# Patient Record
Sex: FEMALE | Race: WHITE | NOT HISPANIC OR LATINO | Employment: FULL TIME | URBAN - METROPOLITAN AREA
[De-identification: names, ages, dates, MRNs, and addresses within clinical notes are randomized per-mention and may not be internally consistent; named-entity substitution may affect disease eponyms.]

---

## 2020-06-19 ENCOUNTER — TRANSCRIBE ORDERS (OUTPATIENT)
Dept: ADMINISTRATIVE | Facility: HOSPITAL | Age: 65
End: 2020-06-19

## 2020-06-19 DIAGNOSIS — K75.4 AUTOIMMUNE HEPATITIS (HCC): Primary | ICD-10-CM

## 2020-06-21 ENCOUNTER — APPOINTMENT (OUTPATIENT)
Dept: PREADMISSION TESTING | Facility: HOSPITAL | Age: 65
End: 2020-06-21

## 2020-06-24 ENCOUNTER — APPOINTMENT (OUTPATIENT)
Dept: CT IMAGING | Facility: HOSPITAL | Age: 65
End: 2020-06-24

## 2020-06-26 ENCOUNTER — APPOINTMENT (OUTPATIENT)
Dept: PREADMISSION TESTING | Facility: HOSPITAL | Age: 65
End: 2020-06-26

## 2020-06-26 PROCEDURE — U0002 COVID-19 LAB TEST NON-CDC: HCPCS

## 2020-06-26 PROCEDURE — C9803 HOPD COVID-19 SPEC COLLECT: HCPCS

## 2020-06-26 PROCEDURE — U0004 COV-19 TEST NON-CDC HGH THRU: HCPCS

## 2020-06-27 LAB
REF LAB TEST METHOD: NORMAL
SARS-COV-2 RNA RESP QL NAA+PROBE: NOT DETECTED

## 2020-06-29 ENCOUNTER — HOSPITAL ENCOUNTER (OUTPATIENT)
Dept: CT IMAGING | Facility: HOSPITAL | Age: 65
Discharge: HOME | End: 2020-06-29
Admitting: INTERNAL MEDICINE

## 2020-06-29 VITALS
BODY MASS INDEX: 28.07 KG/M2 | HEIGHT: 56 IN | RESPIRATION RATE: 20 BRPM | OXYGEN SATURATION: 100 % | DIASTOLIC BLOOD PRESSURE: 74 MMHG | TEMPERATURE: 97.4 F | SYSTOLIC BLOOD PRESSURE: 136 MMHG | HEART RATE: 76 BPM | WEIGHT: 124.8 LBS

## 2020-06-29 DIAGNOSIS — K75.4 AUTOIMMUNE HEPATITIS (HCC): ICD-10-CM

## 2020-06-29 LAB
APTT PPP: 34.3 SECONDS (ref 24–37)
INR PPP: 1.06 (ref 0.85–1.16)
PLATELET # BLD AUTO: 196 10*3/MM3 (ref 140–450)
PROTHROMBIN TIME: 13.5 SECONDS (ref 11.5–14)

## 2020-06-29 PROCEDURE — 88307 TISSUE EXAM BY PATHOLOGIST: CPT | Performed by: INTERNAL MEDICINE

## 2020-06-29 PROCEDURE — 99152 MOD SED SAME PHYS/QHP 5/>YRS: CPT

## 2020-06-29 PROCEDURE — 25010000002 MIDAZOLAM PER 1 MG: Performed by: RADIOLOGY

## 2020-06-29 PROCEDURE — 85049 AUTOMATED PLATELET COUNT: CPT | Performed by: RADIOLOGY

## 2020-06-29 PROCEDURE — 88313 SPECIAL STAINS GROUP 2: CPT | Performed by: INTERNAL MEDICINE

## 2020-06-29 PROCEDURE — 85610 PROTHROMBIN TIME: CPT | Performed by: RADIOLOGY

## 2020-06-29 PROCEDURE — 77012 CT SCAN FOR NEEDLE BIOPSY: CPT

## 2020-06-29 PROCEDURE — 25010000002 FENTANYL CITRATE (PF) 100 MCG/2ML SOLUTION: Performed by: RADIOLOGY

## 2020-06-29 PROCEDURE — 85730 THROMBOPLASTIN TIME PARTIAL: CPT | Performed by: RADIOLOGY

## 2020-06-29 RX ORDER — FENTANYL CITRATE 50 UG/ML
INJECTION, SOLUTION INTRAMUSCULAR; INTRAVENOUS
Status: COMPLETED | OUTPATIENT
Start: 2020-06-29 | End: 2020-06-29

## 2020-06-29 RX ORDER — FERROUS SULFATE 325(65) MG
325 TABLET ORAL 2 TIMES DAILY
COMMUNITY

## 2020-06-29 RX ORDER — CELECOXIB 200 MG/1
200 CAPSULE ORAL DAILY
COMMUNITY
End: 2022-03-04 | Stop reason: HOSPADM

## 2020-06-29 RX ORDER — FENTANYL CITRATE 50 UG/ML
INJECTION, SOLUTION INTRAMUSCULAR; INTRAVENOUS
Status: DISPENSED
Start: 2020-06-29 | End: 2020-06-29

## 2020-06-29 RX ORDER — OMEPRAZOLE 20 MG/1
20 CAPSULE, DELAYED RELEASE ORAL 2 TIMES DAILY
COMMUNITY
End: 2022-03-04 | Stop reason: HOSPADM

## 2020-06-29 RX ORDER — HYDROXYCHLOROQUINE SULFATE 200 MG/1
200 TABLET, FILM COATED ORAL DAILY
COMMUNITY
End: 2022-03-04 | Stop reason: HOSPADM

## 2020-06-29 RX ORDER — PREDNISONE 1 MG/1
5 TABLET ORAL DAILY
COMMUNITY

## 2020-06-29 RX ORDER — MIDAZOLAM HYDROCHLORIDE 1 MG/ML
INJECTION INTRAMUSCULAR; INTRAVENOUS
Status: DISPENSED
Start: 2020-06-29 | End: 2020-06-29

## 2020-06-29 RX ORDER — FOLIC ACID 1 MG/1
1 TABLET ORAL DAILY
COMMUNITY
End: 2022-03-04 | Stop reason: HOSPADM

## 2020-06-29 RX ORDER — SODIUM CHLORIDE 0.9 % (FLUSH) 0.9 %
10 SYRINGE (ML) INJECTION AS NEEDED
Status: DISCONTINUED | OUTPATIENT
Start: 2020-06-29 | End: 2020-06-30 | Stop reason: HOSPADM

## 2020-06-29 RX ORDER — LIDOCAINE HYDROCHLORIDE 10 MG/ML
20 INJECTION, SOLUTION EPIDURAL; INFILTRATION; INTRACAUDAL; PERINEURAL ONCE
Status: COMPLETED | OUTPATIENT
Start: 2020-06-29 | End: 2020-06-29

## 2020-06-29 RX ORDER — FUROSEMIDE 20 MG/1
20 TABLET ORAL DAILY
COMMUNITY
End: 2021-12-02 | Stop reason: HOSPADM

## 2020-06-29 RX ORDER — SODIUM CHLORIDE 0.9 % (FLUSH) 0.9 %
3 SYRINGE (ML) INJECTION EVERY 12 HOURS SCHEDULED
Status: DISCONTINUED | OUTPATIENT
Start: 2020-06-29 | End: 2020-06-30 | Stop reason: HOSPADM

## 2020-06-29 RX ORDER — MIDAZOLAM HYDROCHLORIDE 1 MG/ML
INJECTION INTRAMUSCULAR; INTRAVENOUS
Status: COMPLETED | OUTPATIENT
Start: 2020-06-29 | End: 2020-06-29

## 2020-06-29 RX ADMIN — MIDAZOLAM 1 MG: 1 INJECTION INTRAMUSCULAR; INTRAVENOUS at 09:54

## 2020-06-29 RX ADMIN — MIDAZOLAM 1 MG: 1 INJECTION INTRAMUSCULAR; INTRAVENOUS at 09:51

## 2020-06-29 RX ADMIN — FENTANYL CITRATE 50 MCG: 0.05 INJECTION, SOLUTION INTRAMUSCULAR; INTRAVENOUS at 09:51

## 2020-06-29 RX ADMIN — LIDOCAINE HYDROCHLORIDE 20 ML: 10 INJECTION, SOLUTION EPIDURAL; INFILTRATION; INTRACAUDAL; PERINEURAL at 09:42

## 2020-06-29 NOTE — POST-PROCEDURE NOTE
Interventional Radiology Operative Note    Date: 06/29/20     Time: 10:13     Pre-op Diagnosis: RA. Polycystic liver. Elevated liver enzymes.  Post-op Diagnosis: Same    Procedure: CT guided liver biopsy    Surgeon: FELICITA Espinoza M.D.  Assistants: None    Sedation: Moderate sedation    Estimated Blood Loss (EBL): Trace     Urine Output (UOP): N/A (short procedure)    IVF: N/A (short procedure)    Findings: Polycystic liver    Specimens: 18 gauge core X3     Complications: No immediate    Disposition: Recovery. Stable.

## 2020-06-30 ENCOUNTER — TELEPHONE (OUTPATIENT)
Dept: INFUSION THERAPY | Facility: HOSPITAL | Age: 65
End: 2020-06-30

## 2020-07-27 LAB
CYTO UR: NORMAL
LAB AP CASE REPORT: NORMAL
LAB AP CLINICAL INFORMATION: NORMAL
LAB AP DIAGNOSIS COMMENT: NORMAL
LAB AP SPECIAL STAINS: NORMAL
PATH REPORT.FINAL DX SPEC: NORMAL
PATH REPORT.GROSS SPEC: NORMAL

## 2021-11-21 ENCOUNTER — APPOINTMENT (OUTPATIENT)
Dept: OTHER | Facility: HOSPITAL | Age: 66
End: 2021-11-21

## 2021-11-21 ENCOUNTER — HOSPITAL ENCOUNTER (INPATIENT)
Facility: HOSPITAL | Age: 66
LOS: 11 days | Discharge: NURSING, REHAB OR LONG-TERM CARE | End: 2021-12-02
Attending: INTERNAL MEDICINE | Admitting: INTERNAL MEDICINE

## 2021-11-21 PROBLEM — E87.20 LACTIC ACIDEMIA: Status: ACTIVE | Noted: 2021-11-21

## 2021-11-21 PROBLEM — A41.9 SEPSIS (HCC): Status: ACTIVE | Noted: 2021-11-21

## 2021-11-21 PROBLEM — R65.21 SEPTIC SHOCK: Status: ACTIVE | Noted: 2021-11-21

## 2021-11-21 PROBLEM — A41.9 SEPTIC SHOCK (HCC): Status: ACTIVE | Noted: 2021-11-21

## 2021-11-21 PROBLEM — G89.29 CHRONIC PAIN: Status: ACTIVE | Noted: 2021-11-21

## 2021-11-21 PROBLEM — N13.30 HYDRONEPHROSIS OF LEFT KIDNEY: Status: ACTIVE | Noted: 2021-11-21

## 2021-11-21 PROBLEM — N81.9 PELVIC PROLAPSE: Status: ACTIVE | Noted: 2021-11-21

## 2021-11-21 PROBLEM — R65.21 SEPTIC SHOCK (HCC): Status: RESOLVED | Noted: 2021-11-21 | Resolved: 2021-11-21

## 2021-11-21 PROBLEM — N81.10 FEMALE BLADDER PROLAPSE: Status: ACTIVE | Noted: 2021-11-21

## 2021-11-21 PROBLEM — A41.9 SEPTIC SHOCK: Status: RESOLVED | Noted: 2021-11-21 | Resolved: 2021-11-21

## 2021-11-21 PROBLEM — G89.29 CHRONIC PAIN: Chronic | Status: ACTIVE | Noted: 2021-11-21

## 2021-11-21 LAB
ALBUMIN SERPL-MCNC: 2.4 G/DL (ref 3.5–5.2)
ALBUMIN/GLOB SERPL: 0.9 G/DL
ALP SERPL-CCNC: 505 U/L (ref 39–117)
ALT SERPL W P-5'-P-CCNC: 7 U/L (ref 1–33)
ANION GAP SERPL CALCULATED.3IONS-SCNC: 11 MMOL/L (ref 5–15)
AST SERPL-CCNC: 18 U/L (ref 1–32)
BACTERIA UR QL AUTO: ABNORMAL /HPF
BASOPHILS # BLD MANUAL: 0 10*3/MM3 (ref 0–0.2)
BASOPHILS NFR BLD MANUAL: 0 % (ref 0–1.5)
BILIRUB SERPL-MCNC: 0.5 MG/DL (ref 0–1.2)
BILIRUB UR QL STRIP: NEGATIVE
BUN SERPL-MCNC: 20 MG/DL (ref 8–23)
BUN/CREAT SERPL: 35.1 (ref 7–25)
CALCIUM SPEC-SCNC: 7.5 MG/DL (ref 8.6–10.5)
CHLORIDE SERPL-SCNC: 109 MMOL/L (ref 98–107)
CLARITY UR: ABNORMAL
CO2 SERPL-SCNC: 18 MMOL/L (ref 22–29)
COLOR UR: YELLOW
CREAT SERPL-MCNC: 0.57 MG/DL (ref 0.57–1)
D-LACTATE SERPL-SCNC: 1.1 MMOL/L (ref 0.5–2)
D-LACTATE SERPL-SCNC: 1.3 MMOL/L (ref 0.5–2)
DEPRECATED RDW RBC AUTO: 51.2 FL (ref 37–54)
EOSINOPHIL # BLD MANUAL: 0 10*3/MM3 (ref 0–0.4)
EOSINOPHIL NFR BLD MANUAL: 0 % (ref 0.3–6.2)
ERYTHROCYTE [DISTWIDTH] IN BLOOD BY AUTOMATED COUNT: 14.6 % (ref 12.3–15.4)
GFR SERPL CREATININE-BSD FRML MDRD: 107 ML/MIN/1.73
GLOBULIN UR ELPH-MCNC: 2.7 GM/DL
GLUCOSE SERPL-MCNC: 96 MG/DL (ref 65–99)
GLUCOSE UR STRIP-MCNC: NEGATIVE MG/DL
HCT VFR BLD AUTO: 30.6 % (ref 34–46.6)
HGB BLD-MCNC: 9.4 G/DL (ref 12–15.9)
HGB UR QL STRIP.AUTO: ABNORMAL
HYALINE CASTS UR QL AUTO: ABNORMAL /LPF
INR PPP: 1.14 (ref 0.85–1.16)
KETONES UR QL STRIP: NEGATIVE
LEUKOCYTE ESTERASE UR QL STRIP.AUTO: ABNORMAL
LYMPHOCYTES # BLD MANUAL: 0.08 10*3/MM3 (ref 0.7–3.1)
LYMPHOCYTES NFR BLD MANUAL: 1 % (ref 5–12)
MCH RBC QN AUTO: 29.3 PG (ref 26.6–33)
MCHC RBC AUTO-ENTMCNC: 30.7 G/DL (ref 31.5–35.7)
MCV RBC AUTO: 95.3 FL (ref 79–97)
MONOCYTES # BLD: 0.08 10*3/MM3 (ref 0.1–0.9)
MRSA DNA SPEC QL NAA+PROBE: NEGATIVE
NEUTROPHILS # BLD AUTO: 7.54 10*3/MM3 (ref 1.7–7)
NEUTROPHILS NFR BLD MANUAL: 71 % (ref 42.7–76)
NEUTS BAND NFR BLD MANUAL: 27 % (ref 0–5)
NITRITE UR QL STRIP: POSITIVE
NRBC SPEC MANUAL: 0 /100 WBC (ref 0–0.2)
OVALOCYTES BLD QL SMEAR: ABNORMAL
PH UR STRIP.AUTO: <=5 [PH] (ref 5–8)
PLAT MORPH BLD: NORMAL
PLATELET # BLD AUTO: 191 10*3/MM3 (ref 140–450)
PMV BLD AUTO: 10.1 FL (ref 6–12)
POTASSIUM SERPL-SCNC: 3.3 MMOL/L (ref 3.5–5.2)
PROCALCITONIN SERPL-MCNC: 77.45 NG/ML (ref 0–0.25)
PROT SERPL-MCNC: 5.1 G/DL (ref 6–8.5)
PROT UR QL STRIP: ABNORMAL
PROTHROMBIN TIME: 14.2 SECONDS (ref 11.4–14.4)
RBC # BLD AUTO: 3.21 10*6/MM3 (ref 3.77–5.28)
RBC # UR STRIP: ABNORMAL /HPF
REF LAB TEST METHOD: ABNORMAL
RENAL EPI CELLS #/AREA URNS HPF: ABNORMAL /HPF
SARS-COV-2 RDRP RESP QL NAA+PROBE: NORMAL
SODIUM SERPL-SCNC: 138 MMOL/L (ref 136–145)
SP GR UR STRIP: 1.04 (ref 1–1.03)
SQUAMOUS #/AREA URNS HPF: ABNORMAL /HPF
TROPONIN T SERPL-MCNC: <0.01 NG/ML (ref 0–0.03)
UROBILINOGEN UR QL STRIP: ABNORMAL
VARIANT LYMPHS NFR BLD MANUAL: 1 % (ref 19.6–45.3)
WBC # UR STRIP: ABNORMAL /HPF
WBC MORPH BLD: NORMAL
WBC NRBC COR # BLD: 7.69 10*3/MM3 (ref 3.4–10.8)

## 2021-11-21 PROCEDURE — 81001 URINALYSIS AUTO W/SCOPE: CPT | Performed by: NURSE PRACTITIONER

## 2021-11-21 PROCEDURE — 87635 SARS-COV-2 COVID-19 AMP PRB: CPT | Performed by: INTERNAL MEDICINE

## 2021-11-21 PROCEDURE — 87076 CULTURE ANAEROBE IDENT EACH: CPT | Performed by: INTERNAL MEDICINE

## 2021-11-21 PROCEDURE — 87070 CULTURE OTHR SPECIMN AEROBIC: CPT | Performed by: NURSE PRACTITIONER

## 2021-11-21 PROCEDURE — 25010000002 HEPARIN (PORCINE) PER 1000 UNITS: Performed by: INTERNAL MEDICINE

## 2021-11-21 PROCEDURE — 84484 ASSAY OF TROPONIN QUANT: CPT | Performed by: NURSE PRACTITIONER

## 2021-11-21 PROCEDURE — 63710000001 PREDNISONE PER 5 MG: Performed by: INTERNAL MEDICINE

## 2021-11-21 PROCEDURE — 87641 MR-STAPH DNA AMP PROBE: CPT | Performed by: NURSE PRACTITIONER

## 2021-11-21 PROCEDURE — 85007 BL SMEAR W/DIFF WBC COUNT: CPT | Performed by: NURSE PRACTITIONER

## 2021-11-21 PROCEDURE — 84145 PROCALCITONIN (PCT): CPT | Performed by: NURSE PRACTITIONER

## 2021-11-21 PROCEDURE — 80053 COMPREHEN METABOLIC PANEL: CPT | Performed by: NURSE PRACTITIONER

## 2021-11-21 PROCEDURE — 25010000002 PIPERACILLIN SOD-TAZOBACTAM PER 1 G: Performed by: INTERNAL MEDICINE

## 2021-11-21 PROCEDURE — 25010000002 VANCOMYCIN PER 500 MG

## 2021-11-21 PROCEDURE — 85610 PROTHROMBIN TIME: CPT | Performed by: NURSE PRACTITIONER

## 2021-11-21 PROCEDURE — 25010000002 ONDANSETRON PER 1 MG: Performed by: NURSE PRACTITIONER

## 2021-11-21 PROCEDURE — 87186 SC STD MICRODIL/AGAR DIL: CPT | Performed by: INTERNAL MEDICINE

## 2021-11-21 PROCEDURE — 83605 ASSAY OF LACTIC ACID: CPT | Performed by: INTERNAL MEDICINE

## 2021-11-21 PROCEDURE — 85025 COMPLETE CBC W/AUTO DIFF WBC: CPT | Performed by: NURSE PRACTITIONER

## 2021-11-21 PROCEDURE — 87086 URINE CULTURE/COLONY COUNT: CPT | Performed by: INTERNAL MEDICINE

## 2021-11-21 PROCEDURE — 87205 SMEAR GRAM STAIN: CPT | Performed by: INTERNAL MEDICINE

## 2021-11-21 PROCEDURE — 87205 SMEAR GRAM STAIN: CPT | Performed by: NURSE PRACTITIONER

## 2021-11-21 PROCEDURE — 87040 BLOOD CULTURE FOR BACTERIA: CPT | Performed by: NURSE PRACTITIONER

## 2021-11-21 PROCEDURE — 83605 ASSAY OF LACTIC ACID: CPT | Performed by: NURSE PRACTITIONER

## 2021-11-21 PROCEDURE — 99291 CRITICAL CARE FIRST HOUR: CPT | Performed by: INTERNAL MEDICINE

## 2021-11-21 PROCEDURE — 25010000002 PIPERACILLIN SOD-TAZOBACTAM PER 1 G: Performed by: NURSE PRACTITIONER

## 2021-11-21 PROCEDURE — 87070 CULTURE OTHR SPECIMN AEROBIC: CPT | Performed by: INTERNAL MEDICINE

## 2021-11-21 RX ORDER — PANTOPRAZOLE SODIUM 40 MG/1
40 TABLET, DELAYED RELEASE ORAL
Status: DISCONTINUED | OUTPATIENT
Start: 2021-11-22 | End: 2021-12-02 | Stop reason: HOSPADM

## 2021-11-21 RX ORDER — SODIUM CHLORIDE 9 MG/ML
100 INJECTION, SOLUTION INTRAVENOUS CONTINUOUS
Status: DISCONTINUED | OUTPATIENT
Start: 2021-11-21 | End: 2021-11-26

## 2021-11-21 RX ORDER — FOLIC ACID 1 MG/1
1 TABLET ORAL DAILY
Status: DISCONTINUED | OUTPATIENT
Start: 2021-11-21 | End: 2021-12-02 | Stop reason: HOSPADM

## 2021-11-21 RX ORDER — SODIUM CHLORIDE 0.9 % (FLUSH) 0.9 %
10 SYRINGE (ML) INJECTION AS NEEDED
Status: DISCONTINUED | OUTPATIENT
Start: 2021-11-21 | End: 2021-11-22

## 2021-11-21 RX ORDER — HEPARIN SODIUM 5000 [USP'U]/ML
5000 INJECTION, SOLUTION INTRAVENOUS; SUBCUTANEOUS EVERY 8 HOURS SCHEDULED
Status: DISCONTINUED | OUTPATIENT
Start: 2021-11-21 | End: 2021-12-02 | Stop reason: HOSPADM

## 2021-11-21 RX ORDER — NOREPINEPHRINE BIT/0.9 % NACL 8 MG/250ML
.02-.3 INFUSION BOTTLE (ML) INTRAVENOUS
Status: DISCONTINUED | OUTPATIENT
Start: 2021-11-21 | End: 2021-11-22

## 2021-11-21 RX ORDER — PREDNISONE 1 MG/1
5 TABLET ORAL DAILY
Status: DISCONTINUED | OUTPATIENT
Start: 2021-11-21 | End: 2021-12-02 | Stop reason: HOSPADM

## 2021-11-21 RX ORDER — ONDANSETRON 2 MG/ML
4 INJECTION INTRAMUSCULAR; INTRAVENOUS EVERY 6 HOURS PRN
Status: DISCONTINUED | OUTPATIENT
Start: 2021-11-21 | End: 2021-12-02 | Stop reason: HOSPADM

## 2021-11-21 RX ORDER — SODIUM CHLORIDE 0.9 % (FLUSH) 0.9 %
10 SYRINGE (ML) INJECTION EVERY 12 HOURS SCHEDULED
Status: DISCONTINUED | OUTPATIENT
Start: 2021-11-21 | End: 2021-11-22

## 2021-11-21 RX ADMIN — FOLIC ACID 1 MG: 1 TABLET ORAL at 14:23

## 2021-11-21 RX ADMIN — HEPARIN SODIUM 5000 UNITS: 5000 INJECTION INTRAVENOUS; SUBCUTANEOUS at 22:28

## 2021-11-21 RX ADMIN — SODIUM CHLORIDE 100 ML/HR: 9 INJECTION, SOLUTION INTRAVENOUS at 22:27

## 2021-11-21 RX ADMIN — TAZOBACTAM SODIUM AND PIPERACILLIN SODIUM 3.38 G: 375; 3 INJECTION, SOLUTION INTRAVENOUS at 22:28

## 2021-11-21 RX ADMIN — HEPARIN SODIUM 5000 UNITS: 5000 INJECTION INTRAVENOUS; SUBCUTANEOUS at 14:23

## 2021-11-21 RX ADMIN — VANCOMYCIN HYDROCHLORIDE 750 MG: 750 INJECTION, SOLUTION INTRAVENOUS at 17:02

## 2021-11-21 RX ADMIN — ONDANSETRON 4 MG: 2 INJECTION INTRAMUSCULAR; INTRAVENOUS at 17:02

## 2021-11-21 RX ADMIN — Medication 0.02 MCG/KG/MIN: at 14:22

## 2021-11-21 RX ADMIN — TAZOBACTAM SODIUM AND PIPERACILLIN SODIUM 3.38 G: 375; 3 INJECTION, SOLUTION INTRAVENOUS at 13:23

## 2021-11-21 RX ADMIN — PREDNISONE 5 MG: 5 TABLET ORAL at 14:23

## 2021-11-22 ENCOUNTER — APPOINTMENT (OUTPATIENT)
Dept: GENERAL RADIOLOGY | Facility: HOSPITAL | Age: 66
End: 2021-11-22

## 2021-11-22 LAB
ANION GAP SERPL CALCULATED.3IONS-SCNC: 9 MMOL/L (ref 5–15)
BASOPHILS # BLD AUTO: 0.01 10*3/MM3 (ref 0–0.2)
BASOPHILS NFR BLD AUTO: 0.3 % (ref 0–1.5)
BUN SERPL-MCNC: 13 MG/DL (ref 8–23)
BUN/CREAT SERPL: 26 (ref 7–25)
CALCIUM SPEC-SCNC: 7.4 MG/DL (ref 8.6–10.5)
CHLORIDE SERPL-SCNC: 109 MMOL/L (ref 98–107)
CO2 SERPL-SCNC: 19 MMOL/L (ref 22–29)
CREAT SERPL-MCNC: 0.5 MG/DL (ref 0.57–1)
D-LACTATE SERPL-SCNC: 0.7 MMOL/L (ref 0.5–2)
D-LACTATE SERPL-SCNC: 0.9 MMOL/L (ref 0.5–2)
D-LACTATE SERPL-SCNC: 1.4 MMOL/L (ref 0.5–2)
DEPRECATED RDW RBC AUTO: 50.1 FL (ref 37–54)
EOSINOPHIL # BLD AUTO: 0.01 10*3/MM3 (ref 0–0.4)
EOSINOPHIL NFR BLD AUTO: 0.3 % (ref 0.3–6.2)
ERYTHROCYTE [DISTWIDTH] IN BLOOD BY AUTOMATED COUNT: 14.6 % (ref 12.3–15.4)
GFR SERPL CREATININE-BSD FRML MDRD: 124 ML/MIN/1.73
GLUCOSE SERPL-MCNC: 77 MG/DL (ref 65–99)
HCT VFR BLD AUTO: 26.8 % (ref 34–46.6)
HGB BLD-MCNC: 8.4 G/DL (ref 12–15.9)
IMM GRANULOCYTES # BLD AUTO: 0.03 10*3/MM3 (ref 0–0.05)
IMM GRANULOCYTES NFR BLD AUTO: 1 % (ref 0–0.5)
LYMPHOCYTES # BLD AUTO: 0.14 10*3/MM3 (ref 0.7–3.1)
LYMPHOCYTES NFR BLD AUTO: 4.9 % (ref 19.6–45.3)
MAGNESIUM SERPL-MCNC: 1.4 MG/DL (ref 1.6–2.4)
MCH RBC QN AUTO: 29.2 PG (ref 26.6–33)
MCHC RBC AUTO-ENTMCNC: 31.3 G/DL (ref 31.5–35.7)
MCV RBC AUTO: 93.1 FL (ref 79–97)
MONOCYTES # BLD AUTO: 0.23 10*3/MM3 (ref 0.1–0.9)
MONOCYTES NFR BLD AUTO: 8 % (ref 5–12)
NEUTROPHILS NFR BLD AUTO: 2.45 10*3/MM3 (ref 1.7–7)
NEUTROPHILS NFR BLD AUTO: 85.5 % (ref 42.7–76)
NRBC BLD AUTO-RTO: 0 /100 WBC (ref 0–0.2)
PHOSPHATE SERPL-MCNC: 3 MG/DL (ref 2.5–4.5)
PLAT MORPH BLD: NORMAL
PLATELET # BLD AUTO: 145 10*3/MM3 (ref 140–450)
PMV BLD AUTO: 10 FL (ref 6–12)
POTASSIUM SERPL-SCNC: 2.9 MMOL/L (ref 3.5–5.2)
POTASSIUM SERPL-SCNC: 4.1 MMOL/L (ref 3.5–5.2)
RBC # BLD AUTO: 2.88 10*6/MM3 (ref 3.77–5.28)
RBC MORPH BLD: NORMAL
SODIUM SERPL-SCNC: 137 MMOL/L (ref 136–145)
WBC MORPH BLD: NORMAL
WBC NRBC COR # BLD: 2.87 10*3/MM3 (ref 3.4–10.8)

## 2021-11-22 PROCEDURE — C1751 CATH, INF, PER/CENT/MIDLINE: HCPCS

## 2021-11-22 PROCEDURE — 84132 ASSAY OF SERUM POTASSIUM: CPT | Performed by: INTERNAL MEDICINE

## 2021-11-22 PROCEDURE — 85007 BL SMEAR W/DIFF WBC COUNT: CPT | Performed by: INTERNAL MEDICINE

## 2021-11-22 PROCEDURE — 84100 ASSAY OF PHOSPHORUS: CPT | Performed by: INTERNAL MEDICINE

## 2021-11-22 PROCEDURE — 71045 X-RAY EXAM CHEST 1 VIEW: CPT

## 2021-11-22 PROCEDURE — 02HV33Z INSERTION OF INFUSION DEVICE INTO SUPERIOR VENA CAVA, PERCUTANEOUS APPROACH: ICD-10-PCS | Performed by: INTERNAL MEDICINE

## 2021-11-22 PROCEDURE — C1894 INTRO/SHEATH, NON-LASER: HCPCS

## 2021-11-22 PROCEDURE — 25010000002 HEPARIN (PORCINE) PER 1000 UNITS: Performed by: INTERNAL MEDICINE

## 2021-11-22 PROCEDURE — 83605 ASSAY OF LACTIC ACID: CPT | Performed by: INTERNAL MEDICINE

## 2021-11-22 PROCEDURE — 99232 SBSQ HOSP IP/OBS MODERATE 35: CPT | Performed by: INTERNAL MEDICINE

## 2021-11-22 PROCEDURE — 25010000002 MAGNESIUM SULFATE 2 GM/50ML SOLUTION: Performed by: NURSE PRACTITIONER

## 2021-11-22 PROCEDURE — 80048 BASIC METABOLIC PNL TOTAL CA: CPT | Performed by: INTERNAL MEDICINE

## 2021-11-22 PROCEDURE — 85025 COMPLETE CBC W/AUTO DIFF WBC: CPT | Performed by: INTERNAL MEDICINE

## 2021-11-22 PROCEDURE — 63710000001 PREDNISONE PER 5 MG: Performed by: INTERNAL MEDICINE

## 2021-11-22 PROCEDURE — 25010000002 PIPERACILLIN SOD-TAZOBACTAM PER 1 G: Performed by: INTERNAL MEDICINE

## 2021-11-22 PROCEDURE — 25010000002 VANCOMYCIN PER 500 MG

## 2021-11-22 PROCEDURE — 83735 ASSAY OF MAGNESIUM: CPT | Performed by: INTERNAL MEDICINE

## 2021-11-22 PROCEDURE — 0 MAGNESIUM SULFATE 4 GM/100ML SOLUTION: Performed by: NURSE PRACTITIONER

## 2021-11-22 RX ORDER — POTASSIUM CHLORIDE 1.5 G/1.77G
40 POWDER, FOR SOLUTION ORAL AS NEEDED
Status: DISCONTINUED | OUTPATIENT
Start: 2021-11-22 | End: 2021-12-02 | Stop reason: HOSPADM

## 2021-11-22 RX ORDER — POTASSIUM CHLORIDE 7.45 MG/ML
10 INJECTION INTRAVENOUS
Status: DISCONTINUED | OUTPATIENT
Start: 2021-11-22 | End: 2021-12-02 | Stop reason: HOSPADM

## 2021-11-22 RX ORDER — SODIUM CHLORIDE 0.9 % (FLUSH) 0.9 %
10 SYRINGE (ML) INJECTION AS NEEDED
Status: DISCONTINUED | OUTPATIENT
Start: 2021-11-22 | End: 2021-12-02 | Stop reason: HOSPADM

## 2021-11-22 RX ORDER — HYDROCODONE BITARTRATE AND ACETAMINOPHEN 5; 325 MG/1; MG/1
1 TABLET ORAL EVERY 8 HOURS PRN
COMMUNITY
End: 2021-12-02 | Stop reason: HOSPADM

## 2021-11-22 RX ORDER — MAGNESIUM SULFATE HEPTAHYDRATE 40 MG/ML
4 INJECTION, SOLUTION INTRAVENOUS AS NEEDED
Status: DISCONTINUED | OUTPATIENT
Start: 2021-11-22 | End: 2021-12-02 | Stop reason: HOSPADM

## 2021-11-22 RX ORDER — MAGNESIUM SULFATE HEPTAHYDRATE 40 MG/ML
2 INJECTION, SOLUTION INTRAVENOUS AS NEEDED
Status: DISCONTINUED | OUTPATIENT
Start: 2021-11-22 | End: 2021-12-02 | Stop reason: HOSPADM

## 2021-11-22 RX ORDER — ACETAMINOPHEN 325 MG/1
650 TABLET ORAL EVERY 6 HOURS PRN
Status: DISCONTINUED | OUTPATIENT
Start: 2021-11-22 | End: 2021-12-02 | Stop reason: HOSPADM

## 2021-11-22 RX ORDER — POTASSIUM CHLORIDE 750 MG/1
40 CAPSULE, EXTENDED RELEASE ORAL AS NEEDED
Status: DISCONTINUED | OUTPATIENT
Start: 2021-11-22 | End: 2021-12-02 | Stop reason: HOSPADM

## 2021-11-22 RX ORDER — SODIUM CHLORIDE 0.9 % (FLUSH) 0.9 %
10 SYRINGE (ML) INJECTION EVERY 12 HOURS SCHEDULED
Status: DISCONTINUED | OUTPATIENT
Start: 2021-11-22 | End: 2021-12-02 | Stop reason: HOSPADM

## 2021-11-22 RX ORDER — HYDROCODONE BITARTRATE AND ACETAMINOPHEN 7.5; 325 MG/1; MG/1
1 TABLET ORAL EVERY 6 HOURS PRN
Status: DISPENSED | OUTPATIENT
Start: 2021-11-22 | End: 2021-11-29

## 2021-11-22 RX ORDER — SODIUM CHLORIDE 0.9 % (FLUSH) 0.9 %
20 SYRINGE (ML) INJECTION AS NEEDED
Status: DISCONTINUED | OUTPATIENT
Start: 2021-11-22 | End: 2021-12-02 | Stop reason: HOSPADM

## 2021-11-22 RX ORDER — HYDROCODONE BITARTRATE AND ACETAMINOPHEN 5; 325 MG/1; MG/1
1 TABLET ORAL ONCE AS NEEDED
Status: COMPLETED | OUTPATIENT
Start: 2021-11-22 | End: 2021-11-22

## 2021-11-22 RX ADMIN — MAGNESIUM SULFATE HEPTAHYDRATE 4 G: 40 INJECTION, SOLUTION INTRAVENOUS at 10:18

## 2021-11-22 RX ADMIN — POTASSIUM CHLORIDE 40 MEQ: 750 CAPSULE, EXTENDED RELEASE ORAL at 10:18

## 2021-11-22 RX ADMIN — HEPARIN SODIUM 5000 UNITS: 5000 INJECTION INTRAVENOUS; SUBCUTANEOUS at 13:34

## 2021-11-22 RX ADMIN — TAZOBACTAM SODIUM AND PIPERACILLIN SODIUM 3.38 G: 375; 3 INJECTION, SOLUTION INTRAVENOUS at 05:34

## 2021-11-22 RX ADMIN — FOLIC ACID 1 MG: 1 TABLET ORAL at 08:18

## 2021-11-22 RX ADMIN — VANCOMYCIN HYDROCHLORIDE 750 MG: 750 INJECTION, SOLUTION INTRAVENOUS at 17:10

## 2021-11-22 RX ADMIN — POTASSIUM CHLORIDE 40 MEQ: 750 CAPSULE, EXTENDED RELEASE ORAL at 13:34

## 2021-11-22 RX ADMIN — MAGNESIUM SULFATE HEPTAHYDRATE 2 G: 2 INJECTION, SOLUTION INTRAVENOUS at 13:35

## 2021-11-22 RX ADMIN — HYDROCODONE BITARTRATE AND ACETAMINOPHEN 1 TABLET: 5; 325 TABLET ORAL at 17:10

## 2021-11-22 RX ADMIN — HYDROCODONE BITARTRATE AND ACETAMINOPHEN 1 TABLET: 7.5; 325 TABLET ORAL at 21:33

## 2021-11-22 RX ADMIN — ACETAMINOPHEN 650 MG: 325 TABLET, FILM COATED ORAL at 08:21

## 2021-11-22 RX ADMIN — SODIUM CHLORIDE 100 ML/HR: 9 INJECTION, SOLUTION INTRAVENOUS at 08:18

## 2021-11-22 RX ADMIN — MICONAZOLE NITRATE: 20 POWDER TOPICAL at 11:53

## 2021-11-22 RX ADMIN — PANTOPRAZOLE SODIUM 40 MG: 40 TABLET, DELAYED RELEASE ORAL at 05:33

## 2021-11-22 RX ADMIN — TAZOBACTAM SODIUM AND PIPERACILLIN SODIUM 3.38 G: 375; 3 INJECTION, SOLUTION INTRAVENOUS at 23:34

## 2021-11-22 RX ADMIN — PREDNISONE 5 MG: 5 TABLET ORAL at 08:18

## 2021-11-22 RX ADMIN — ACETAMINOPHEN 650 MG: 325 TABLET, FILM COATED ORAL at 03:36

## 2021-11-22 RX ADMIN — VANCOMYCIN HYDROCHLORIDE 750 MG: 750 INJECTION, SOLUTION INTRAVENOUS at 05:33

## 2021-11-22 RX ADMIN — HEPARIN SODIUM 5000 UNITS: 5000 INJECTION INTRAVENOUS; SUBCUTANEOUS at 05:34

## 2021-11-22 RX ADMIN — SODIUM CHLORIDE 100 ML/HR: 9 INJECTION, SOLUTION INTRAVENOUS at 21:33

## 2021-11-22 RX ADMIN — HEPARIN SODIUM 5000 UNITS: 5000 INJECTION INTRAVENOUS; SUBCUTANEOUS at 21:08

## 2021-11-22 RX ADMIN — SODIUM CHLORIDE 100 ML/HR: 9 INJECTION, SOLUTION INTRAVENOUS at 17:10

## 2021-11-22 RX ADMIN — TAZOBACTAM SODIUM AND PIPERACILLIN SODIUM 3.38 G: 375; 3 INJECTION, SOLUTION INTRAVENOUS at 13:35

## 2021-11-23 LAB
ANION GAP SERPL CALCULATED.3IONS-SCNC: 6 MMOL/L (ref 5–15)
BACTERIA SPEC AEROBE CULT: ABNORMAL
BUN SERPL-MCNC: 12 MG/DL (ref 8–23)
BUN/CREAT SERPL: 23.1 (ref 7–25)
CALCIUM SPEC-SCNC: 7.7 MG/DL (ref 8.6–10.5)
CHLORIDE SERPL-SCNC: 112 MMOL/L (ref 98–107)
CO2 SERPL-SCNC: 21 MMOL/L (ref 22–29)
CREAT SERPL-MCNC: 0.52 MG/DL (ref 0.57–1)
DEPRECATED RDW RBC AUTO: 51.9 FL (ref 37–54)
ERYTHROCYTE [DISTWIDTH] IN BLOOD BY AUTOMATED COUNT: 14.7 % (ref 12.3–15.4)
GFR SERPL CREATININE-BSD FRML MDRD: 119 ML/MIN/1.73
GLUCOSE SERPL-MCNC: 70 MG/DL (ref 65–99)
GRAM STN SPEC: ABNORMAL
HCT VFR BLD AUTO: 31.4 % (ref 34–46.6)
HGB BLD-MCNC: 9.7 G/DL (ref 12–15.9)
MAGNESIUM SERPL-MCNC: 3 MG/DL (ref 1.6–2.4)
MCH RBC QN AUTO: 29.3 PG (ref 26.6–33)
MCHC RBC AUTO-ENTMCNC: 30.9 G/DL (ref 31.5–35.7)
MCV RBC AUTO: 94.9 FL (ref 79–97)
PLATELET # BLD AUTO: 152 10*3/MM3 (ref 140–450)
PMV BLD AUTO: 10.5 FL (ref 6–12)
POTASSIUM SERPL-SCNC: 4.8 MMOL/L (ref 3.5–5.2)
RBC # BLD AUTO: 3.31 10*6/MM3 (ref 3.77–5.28)
SODIUM SERPL-SCNC: 139 MMOL/L (ref 136–145)
VANCOMYCIN TROUGH SERPL-MCNC: 14.3 MCG/ML (ref 5–20)
WBC NRBC COR # BLD: 3.05 10*3/MM3 (ref 3.4–10.8)

## 2021-11-23 PROCEDURE — 83735 ASSAY OF MAGNESIUM: CPT | Performed by: INTERNAL MEDICINE

## 2021-11-23 PROCEDURE — 85027 COMPLETE CBC AUTOMATED: CPT | Performed by: INTERNAL MEDICINE

## 2021-11-23 PROCEDURE — 25010000002 CEFTRIAXONE PER 250 MG: Performed by: HOSPITALIST

## 2021-11-23 PROCEDURE — 80048 BASIC METABOLIC PNL TOTAL CA: CPT | Performed by: INTERNAL MEDICINE

## 2021-11-23 PROCEDURE — 63710000001 PREDNISONE PER 5 MG: Performed by: INTERNAL MEDICINE

## 2021-11-23 PROCEDURE — 25010000002 HEPARIN (PORCINE) PER 1000 UNITS: Performed by: INTERNAL MEDICINE

## 2021-11-23 PROCEDURE — 25010000002 VANCOMYCIN PER 500 MG: Performed by: INTERNAL MEDICINE

## 2021-11-23 PROCEDURE — 25010000002 PIPERACILLIN SOD-TAZOBACTAM PER 1 G: Performed by: INTERNAL MEDICINE

## 2021-11-23 PROCEDURE — 80202 ASSAY OF VANCOMYCIN: CPT | Performed by: INTERNAL MEDICINE

## 2021-11-23 PROCEDURE — 99233 SBSQ HOSP IP/OBS HIGH 50: CPT | Performed by: HOSPITALIST

## 2021-11-23 RX ADMIN — FOLIC ACID 1 MG: 1 TABLET ORAL at 08:19

## 2021-11-23 RX ADMIN — MICONAZOLE NITRATE: 20 POWDER TOPICAL at 21:34

## 2021-11-23 RX ADMIN — HEPARIN SODIUM 5000 UNITS: 5000 INJECTION INTRAVENOUS; SUBCUTANEOUS at 21:24

## 2021-11-23 RX ADMIN — SODIUM CHLORIDE 100 ML/HR: 9 INJECTION, SOLUTION INTRAVENOUS at 08:29

## 2021-11-23 RX ADMIN — SODIUM CHLORIDE 100 ML/HR: 9 INJECTION, SOLUTION INTRAVENOUS at 21:28

## 2021-11-23 RX ADMIN — TAZOBACTAM SODIUM AND PIPERACILLIN SODIUM 3.38 G: 375; 3 INJECTION, SOLUTION INTRAVENOUS at 06:49

## 2021-11-23 RX ADMIN — HEPARIN SODIUM 5000 UNITS: 5000 INJECTION INTRAVENOUS; SUBCUTANEOUS at 12:26

## 2021-11-23 RX ADMIN — PANTOPRAZOLE SODIUM 40 MG: 40 TABLET, DELAYED RELEASE ORAL at 06:49

## 2021-11-23 RX ADMIN — SODIUM CHLORIDE 1 G: 900 INJECTION INTRAVENOUS at 15:58

## 2021-11-23 RX ADMIN — HEPARIN SODIUM 5000 UNITS: 5000 INJECTION INTRAVENOUS; SUBCUTANEOUS at 06:49

## 2021-11-23 RX ADMIN — SODIUM CHLORIDE, PRESERVATIVE FREE 10 ML: 5 INJECTION INTRAVENOUS at 21:24

## 2021-11-23 RX ADMIN — HYDROCODONE BITARTRATE AND ACETAMINOPHEN 1 TABLET: 7.5; 325 TABLET ORAL at 12:26

## 2021-11-23 RX ADMIN — PREDNISONE 5 MG: 5 TABLET ORAL at 08:19

## 2021-11-23 RX ADMIN — VANCOMYCIN HYDROCHLORIDE 750 MG: 750 INJECTION, SOLUTION INTRAVENOUS at 08:33

## 2021-11-24 PROBLEM — E43 SEVERE MALNUTRITION: Status: ACTIVE | Noted: 2021-11-24

## 2021-11-24 LAB
ALBUMIN SERPL-MCNC: 2.5 G/DL (ref 3.5–5.2)
ALBUMIN/GLOB SERPL: 1.1 G/DL
ALP SERPL-CCNC: 367 U/L (ref 39–117)
ALT SERPL W P-5'-P-CCNC: <5 U/L (ref 1–33)
ANION GAP SERPL CALCULATED.3IONS-SCNC: 8 MMOL/L (ref 5–15)
AST SERPL-CCNC: 13 U/L (ref 1–32)
BACTERIA SPEC AEROBE CULT: ABNORMAL
BILIRUB SERPL-MCNC: 0.4 MG/DL (ref 0–1.2)
BUN SERPL-MCNC: 8 MG/DL (ref 8–23)
BUN/CREAT SERPL: 24.2 (ref 7–25)
CALCIUM SPEC-SCNC: 7.8 MG/DL (ref 8.6–10.5)
CHLORIDE SERPL-SCNC: 111 MMOL/L (ref 98–107)
CO2 SERPL-SCNC: 20 MMOL/L (ref 22–29)
CREAT SERPL-MCNC: 0.33 MG/DL (ref 0.57–1)
DEPRECATED RDW RBC AUTO: 50.1 FL (ref 37–54)
ERYTHROCYTE [DISTWIDTH] IN BLOOD BY AUTOMATED COUNT: 14.7 % (ref 12.3–15.4)
GFR SERPL CREATININE-BSD FRML MDRD: >150 ML/MIN/1.73
GLOBULIN UR ELPH-MCNC: 2.3 GM/DL
GLUCOSE SERPL-MCNC: 69 MG/DL (ref 65–99)
HCT VFR BLD AUTO: 30.4 % (ref 34–46.6)
HGB BLD-MCNC: 9.7 G/DL (ref 12–15.9)
MAGNESIUM SERPL-MCNC: 1.7 MG/DL (ref 1.6–2.4)
MCH RBC QN AUTO: 29.2 PG (ref 26.6–33)
MCHC RBC AUTO-ENTMCNC: 31.9 G/DL (ref 31.5–35.7)
MCV RBC AUTO: 91.6 FL (ref 79–97)
PLATELET # BLD AUTO: 144 10*3/MM3 (ref 140–450)
PMV BLD AUTO: 10.7 FL (ref 6–12)
POTASSIUM SERPL-SCNC: 3.8 MMOL/L (ref 3.5–5.2)
PROT SERPL-MCNC: 4.8 G/DL (ref 6–8.5)
RBC # BLD AUTO: 3.32 10*6/MM3 (ref 3.77–5.28)
SODIUM SERPL-SCNC: 139 MMOL/L (ref 136–145)
WBC NRBC COR # BLD: 2.69 10*3/MM3 (ref 3.4–10.8)

## 2021-11-24 PROCEDURE — 25010000002 HEPARIN (PORCINE) PER 1000 UNITS: Performed by: INTERNAL MEDICINE

## 2021-11-24 PROCEDURE — 99233 SBSQ HOSP IP/OBS HIGH 50: CPT | Performed by: HOSPITALIST

## 2021-11-24 PROCEDURE — 0 MAGNESIUM SULFATE 4 GM/100ML SOLUTION: Performed by: INTERNAL MEDICINE

## 2021-11-24 PROCEDURE — 25010000002 CEFEPIME PER 500 MG: Performed by: HOSPITALIST

## 2021-11-24 PROCEDURE — 25010000002 ONDANSETRON PER 1 MG: Performed by: INTERNAL MEDICINE

## 2021-11-24 PROCEDURE — 80053 COMPREHEN METABOLIC PANEL: CPT | Performed by: HOSPITALIST

## 2021-11-24 PROCEDURE — 83735 ASSAY OF MAGNESIUM: CPT | Performed by: INTERNAL MEDICINE

## 2021-11-24 PROCEDURE — 63710000001 PREDNISONE PER 5 MG: Performed by: INTERNAL MEDICINE

## 2021-11-24 PROCEDURE — 85027 COMPLETE CBC AUTOMATED: CPT | Performed by: HOSPITALIST

## 2021-11-24 PROCEDURE — 97162 PT EVAL MOD COMPLEX 30 MIN: CPT | Performed by: PHYSICAL THERAPIST

## 2021-11-24 RX ADMIN — PREDNISONE 5 MG: 5 TABLET ORAL at 08:00

## 2021-11-24 RX ADMIN — MICONAZOLE NITRATE: 20 POWDER TOPICAL at 20:26

## 2021-11-24 RX ADMIN — MAGNESIUM SULFATE HEPTAHYDRATE 4 G: 40 INJECTION, SOLUTION INTRAVENOUS at 06:29

## 2021-11-24 RX ADMIN — HEPARIN SODIUM 5000 UNITS: 5000 INJECTION INTRAVENOUS; SUBCUTANEOUS at 05:17

## 2021-11-24 RX ADMIN — SODIUM CHLORIDE 100 ML/HR: 9 INJECTION, SOLUTION INTRAVENOUS at 06:32

## 2021-11-24 RX ADMIN — HYDROCODONE BITARTRATE AND ACETAMINOPHEN 1 TABLET: 7.5; 325 TABLET ORAL at 20:22

## 2021-11-24 RX ADMIN — CEFEPIME HYDROCHLORIDE 2 G: 2 INJECTION, POWDER, FOR SOLUTION INTRAVENOUS at 17:39

## 2021-11-24 RX ADMIN — CEFEPIME HYDROCHLORIDE 2 G: 2 INJECTION, POWDER, FOR SOLUTION INTRAVENOUS at 11:30

## 2021-11-24 RX ADMIN — HYDROCODONE BITARTRATE AND ACETAMINOPHEN 1 TABLET: 7.5; 325 TABLET ORAL at 07:59

## 2021-11-24 RX ADMIN — FOLIC ACID 1 MG: 1 TABLET ORAL at 07:59

## 2021-11-24 RX ADMIN — ONDANSETRON 4 MG: 2 INJECTION INTRAMUSCULAR; INTRAVENOUS at 05:24

## 2021-11-24 RX ADMIN — HEPARIN SODIUM 5000 UNITS: 5000 INJECTION INTRAVENOUS; SUBCUTANEOUS at 20:23

## 2021-11-24 RX ADMIN — PANTOPRAZOLE SODIUM 40 MG: 40 TABLET, DELAYED RELEASE ORAL at 05:17

## 2021-11-25 LAB
MAGNESIUM SERPL-MCNC: 2 MG/DL (ref 1.6–2.4)
POTASSIUM SERPL-SCNC: 3.7 MMOL/L (ref 3.5–5.2)

## 2021-11-25 PROCEDURE — 83735 ASSAY OF MAGNESIUM: CPT | Performed by: INTERNAL MEDICINE

## 2021-11-25 PROCEDURE — 99233 SBSQ HOSP IP/OBS HIGH 50: CPT | Performed by: HOSPITALIST

## 2021-11-25 PROCEDURE — 25010000002 CEFEPIME PER 500 MG: Performed by: HOSPITALIST

## 2021-11-25 PROCEDURE — 25010000002 ONDANSETRON PER 1 MG: Performed by: INTERNAL MEDICINE

## 2021-11-25 PROCEDURE — 25010000002 HEPARIN (PORCINE) PER 1000 UNITS: Performed by: INTERNAL MEDICINE

## 2021-11-25 PROCEDURE — 63710000001 PREDNISONE PER 5 MG: Performed by: INTERNAL MEDICINE

## 2021-11-25 PROCEDURE — 84132 ASSAY OF SERUM POTASSIUM: CPT | Performed by: INTERNAL MEDICINE

## 2021-11-25 RX ORDER — POLYETHYLENE GLYCOL 3350 17 G/17G
17 POWDER, FOR SOLUTION ORAL DAILY
Status: DISCONTINUED | OUTPATIENT
Start: 2021-11-25 | End: 2021-12-02 | Stop reason: HOSPADM

## 2021-11-25 RX ORDER — BISACODYL 5 MG/1
10 TABLET, DELAYED RELEASE ORAL DAILY
Status: DISCONTINUED | OUTPATIENT
Start: 2021-11-25 | End: 2021-12-02 | Stop reason: HOSPADM

## 2021-11-25 RX ADMIN — HEPARIN SODIUM 5000 UNITS: 5000 INJECTION INTRAVENOUS; SUBCUTANEOUS at 19:49

## 2021-11-25 RX ADMIN — HYDROCODONE BITARTRATE AND ACETAMINOPHEN 1 TABLET: 7.5; 325 TABLET ORAL at 09:46

## 2021-11-25 RX ADMIN — HEPARIN SODIUM 5000 UNITS: 5000 INJECTION INTRAVENOUS; SUBCUTANEOUS at 14:04

## 2021-11-25 RX ADMIN — HEPARIN SODIUM 5000 UNITS: 5000 INJECTION INTRAVENOUS; SUBCUTANEOUS at 05:58

## 2021-11-25 RX ADMIN — HYDROCODONE BITARTRATE AND ACETAMINOPHEN 1 TABLET: 7.5; 325 TABLET ORAL at 19:49

## 2021-11-25 RX ADMIN — FOLIC ACID 1 MG: 1 TABLET ORAL at 09:35

## 2021-11-25 RX ADMIN — CEFEPIME HYDROCHLORIDE 2 G: 2 INJECTION, POWDER, FOR SOLUTION INTRAVENOUS at 02:20

## 2021-11-25 RX ADMIN — CEFEPIME HYDROCHLORIDE 2 G: 2 INJECTION, POWDER, FOR SOLUTION INTRAVENOUS at 09:36

## 2021-11-25 RX ADMIN — PANTOPRAZOLE SODIUM 40 MG: 40 TABLET, DELAYED RELEASE ORAL at 05:58

## 2021-11-25 RX ADMIN — BISACODYL 10 MG: 5 TABLET, COATED ORAL at 12:08

## 2021-11-25 RX ADMIN — CEFEPIME HYDROCHLORIDE 2 G: 2 INJECTION, POWDER, FOR SOLUTION INTRAVENOUS at 17:27

## 2021-11-25 RX ADMIN — POLYETHYLENE GLYCOL 3350 17 G: 17 POWDER, FOR SOLUTION ORAL at 12:09

## 2021-11-25 RX ADMIN — PREDNISONE 5 MG: 5 TABLET ORAL at 09:35

## 2021-11-25 RX ADMIN — SODIUM CHLORIDE, PRESERVATIVE FREE 10 ML: 5 INJECTION INTRAVENOUS at 09:37

## 2021-11-25 RX ADMIN — SODIUM CHLORIDE, PRESERVATIVE FREE 10 ML: 5 INJECTION INTRAVENOUS at 19:49

## 2021-11-25 RX ADMIN — MICONAZOLE NITRATE: 20 POWDER TOPICAL at 09:36

## 2021-11-25 RX ADMIN — ONDANSETRON 4 MG: 2 INJECTION INTRAMUSCULAR; INTRAVENOUS at 20:09

## 2021-11-25 RX ADMIN — MICONAZOLE NITRATE: 20 POWDER TOPICAL at 19:49

## 2021-11-26 LAB
BACTERIA SPEC AEROBE CULT: NORMAL
BACTERIA SPEC AEROBE CULT: NORMAL
MAGNESIUM SERPL-MCNC: 1.8 MG/DL (ref 1.6–2.4)
POTASSIUM SERPL-SCNC: 3.5 MMOL/L (ref 3.5–5.2)

## 2021-11-26 PROCEDURE — 99233 SBSQ HOSP IP/OBS HIGH 50: CPT | Performed by: HOSPITALIST

## 2021-11-26 PROCEDURE — 63710000001 PREDNISONE PER 5 MG: Performed by: INTERNAL MEDICINE

## 2021-11-26 PROCEDURE — 25010000002 CEFEPIME PER 500 MG: Performed by: HOSPITALIST

## 2021-11-26 PROCEDURE — 25010000002 HEPARIN (PORCINE) PER 1000 UNITS: Performed by: INTERNAL MEDICINE

## 2021-11-26 PROCEDURE — 0 MAGNESIUM SULFATE 4 GM/100ML SOLUTION: Performed by: INTERNAL MEDICINE

## 2021-11-26 PROCEDURE — 84132 ASSAY OF SERUM POTASSIUM: CPT | Performed by: INTERNAL MEDICINE

## 2021-11-26 PROCEDURE — 83735 ASSAY OF MAGNESIUM: CPT | Performed by: INTERNAL MEDICINE

## 2021-11-26 RX ORDER — GRANULES FOR ORAL 3 G/1
3 POWDER ORAL ONCE
Status: COMPLETED | OUTPATIENT
Start: 2021-11-26 | End: 2021-11-26

## 2021-11-26 RX ORDER — CASTOR OIL AND BALSAM, PERU 788; 87 MG/G; MG/G
1 OINTMENT TOPICAL EVERY 12 HOURS SCHEDULED
Status: DISCONTINUED | OUTPATIENT
Start: 2021-11-26 | End: 2021-12-02 | Stop reason: HOSPADM

## 2021-11-26 RX ADMIN — HYDROCODONE BITARTRATE AND ACETAMINOPHEN 1 TABLET: 7.5; 325 TABLET ORAL at 10:55

## 2021-11-26 RX ADMIN — HEPARIN SODIUM 5000 UNITS: 5000 INJECTION INTRAVENOUS; SUBCUTANEOUS at 13:32

## 2021-11-26 RX ADMIN — ACETAMINOPHEN 650 MG: 325 TABLET, FILM COATED ORAL at 15:17

## 2021-11-26 RX ADMIN — HYDROCODONE BITARTRATE AND ACETAMINOPHEN 1 TABLET: 7.5; 325 TABLET ORAL at 20:41

## 2021-11-26 RX ADMIN — POTASSIUM CHLORIDE 40 MEQ: 750 CAPSULE, EXTENDED RELEASE ORAL at 20:19

## 2021-11-26 RX ADMIN — HEPARIN SODIUM 5000 UNITS: 5000 INJECTION INTRAVENOUS; SUBCUTANEOUS at 20:19

## 2021-11-26 RX ADMIN — CASTOR OIL AND BALSAM, PERU 1 APPLICATION: 788; 87 OINTMENT TOPICAL at 13:32

## 2021-11-26 RX ADMIN — PANTOPRAZOLE SODIUM 40 MG: 40 TABLET, DELAYED RELEASE ORAL at 05:16

## 2021-11-26 RX ADMIN — FOLIC ACID 1 MG: 1 TABLET ORAL at 08:13

## 2021-11-26 RX ADMIN — MAGNESIUM SULFATE HEPTAHYDRATE 4 G: 40 INJECTION, SOLUTION INTRAVENOUS at 20:19

## 2021-11-26 RX ADMIN — CEFEPIME HYDROCHLORIDE 2 G: 2 INJECTION, POWDER, FOR SOLUTION INTRAVENOUS at 01:26

## 2021-11-26 RX ADMIN — MICONAZOLE NITRATE: 20 POWDER TOPICAL at 08:13

## 2021-11-26 RX ADMIN — MICONAZOLE NITRATE: 20 POWDER TOPICAL at 20:19

## 2021-11-26 RX ADMIN — HEPARIN SODIUM 5000 UNITS: 5000 INJECTION INTRAVENOUS; SUBCUTANEOUS at 05:15

## 2021-11-26 RX ADMIN — FOSFOMYCIN TROMETHAMINE 3 G: 3 POWDER ORAL at 11:27

## 2021-11-26 RX ADMIN — CASTOR OIL AND BALSAM, PERU 1 APPLICATION: 788; 87 OINTMENT TOPICAL at 20:16

## 2021-11-26 RX ADMIN — PREDNISONE 5 MG: 5 TABLET ORAL at 08:13

## 2021-11-26 RX ADMIN — SODIUM CHLORIDE 100 ML/HR: 9 INJECTION, SOLUTION INTRAVENOUS at 01:26

## 2021-11-27 ENCOUNTER — APPOINTMENT (OUTPATIENT)
Dept: ULTRASOUND IMAGING | Facility: HOSPITAL | Age: 66
End: 2021-11-27

## 2021-11-27 LAB
MAGNESIUM SERPL-MCNC: 2.1 MG/DL (ref 1.6–2.4)
POTASSIUM SERPL-SCNC: 4.6 MMOL/L (ref 3.5–5.2)

## 2021-11-27 PROCEDURE — 84132 ASSAY OF SERUM POTASSIUM: CPT | Performed by: INTERNAL MEDICINE

## 2021-11-27 PROCEDURE — 83735 ASSAY OF MAGNESIUM: CPT | Performed by: INTERNAL MEDICINE

## 2021-11-27 PROCEDURE — 99232 SBSQ HOSP IP/OBS MODERATE 35: CPT | Performed by: HOSPITALIST

## 2021-11-27 PROCEDURE — 25010000002 HEPARIN (PORCINE) PER 1000 UNITS: Performed by: INTERNAL MEDICINE

## 2021-11-27 PROCEDURE — 63710000001 PREDNISONE PER 5 MG: Performed by: INTERNAL MEDICINE

## 2021-11-27 PROCEDURE — 76775 US EXAM ABDO BACK WALL LIM: CPT

## 2021-11-27 PROCEDURE — 25010000002 ONDANSETRON PER 1 MG: Performed by: INTERNAL MEDICINE

## 2021-11-27 RX ADMIN — PREDNISONE 5 MG: 5 TABLET ORAL at 09:38

## 2021-11-27 RX ADMIN — HEPARIN SODIUM 5000 UNITS: 5000 INJECTION INTRAVENOUS; SUBCUTANEOUS at 14:55

## 2021-11-27 RX ADMIN — PANTOPRAZOLE SODIUM 40 MG: 40 TABLET, DELAYED RELEASE ORAL at 05:44

## 2021-11-27 RX ADMIN — FOLIC ACID 1 MG: 1 TABLET ORAL at 09:38

## 2021-11-27 RX ADMIN — MICONAZOLE NITRATE 1 APPLICATION: 20 POWDER TOPICAL at 09:39

## 2021-11-27 RX ADMIN — MICONAZOLE NITRATE: 20 POWDER TOPICAL at 20:32

## 2021-11-27 RX ADMIN — HEPARIN SODIUM 5000 UNITS: 5000 INJECTION INTRAVENOUS; SUBCUTANEOUS at 20:32

## 2021-11-27 RX ADMIN — CASTOR OIL AND BALSAM, PERU 1 APPLICATION: 788; 87 OINTMENT TOPICAL at 09:39

## 2021-11-27 RX ADMIN — METOPROLOL TARTRATE 25 MG: 25 TABLET, FILM COATED ORAL at 14:25

## 2021-11-27 RX ADMIN — HYDROCODONE BITARTRATE AND ACETAMINOPHEN 1 TABLET: 7.5; 325 TABLET ORAL at 05:55

## 2021-11-27 RX ADMIN — ACETAMINOPHEN 650 MG: 325 TABLET, FILM COATED ORAL at 09:55

## 2021-11-27 RX ADMIN — ONDANSETRON 4 MG: 2 INJECTION INTRAMUSCULAR; INTRAVENOUS at 18:56

## 2021-11-27 RX ADMIN — CASTOR OIL AND BALSAM, PERU 1 APPLICATION: 788; 87 OINTMENT TOPICAL at 20:26

## 2021-11-27 RX ADMIN — HYDROCODONE BITARTRATE AND ACETAMINOPHEN 1 TABLET: 7.5; 325 TABLET ORAL at 14:25

## 2021-11-27 RX ADMIN — HEPARIN SODIUM 5000 UNITS: 5000 INJECTION INTRAVENOUS; SUBCUTANEOUS at 05:44

## 2021-11-27 RX ADMIN — HYDROCODONE BITARTRATE AND ACETAMINOPHEN 1 TABLET: 7.5; 325 TABLET ORAL at 20:33

## 2021-11-28 LAB
ANION GAP SERPL CALCULATED.3IONS-SCNC: 10 MMOL/L (ref 5–15)
BUN SERPL-MCNC: 7 MG/DL (ref 8–23)
BUN/CREAT SERPL: 17.5 (ref 7–25)
CALCIUM SPEC-SCNC: 8.4 MG/DL (ref 8.6–10.5)
CHLORIDE SERPL-SCNC: 104 MMOL/L (ref 98–107)
CO2 SERPL-SCNC: 23 MMOL/L (ref 22–29)
CREAT SERPL-MCNC: 0.4 MG/DL (ref 0.57–1)
DEPRECATED RDW RBC AUTO: 50.6 FL (ref 37–54)
ERYTHROCYTE [DISTWIDTH] IN BLOOD BY AUTOMATED COUNT: 14.6 % (ref 12.3–15.4)
GFR SERPL CREATININE-BSD FRML MDRD: >150 ML/MIN/1.73
GLUCOSE BLDC GLUCOMTR-MCNC: 102 MG/DL (ref 70–130)
GLUCOSE BLDC GLUCOMTR-MCNC: 107 MG/DL (ref 70–130)
GLUCOSE BLDC GLUCOMTR-MCNC: 29 MG/DL (ref 70–130)
GLUCOSE BLDC GLUCOMTR-MCNC: 41 MG/DL (ref 70–130)
GLUCOSE BLDC GLUCOMTR-MCNC: 45 MG/DL (ref 70–130)
GLUCOSE BLDC GLUCOMTR-MCNC: 53 MG/DL (ref 70–130)
GLUCOSE BLDC GLUCOMTR-MCNC: 70 MG/DL (ref 70–130)
GLUCOSE BLDC GLUCOMTR-MCNC: 71 MG/DL (ref 70–130)
GLUCOSE BLDC GLUCOMTR-MCNC: 83 MG/DL (ref 70–130)
GLUCOSE BLDC GLUCOMTR-MCNC: 85 MG/DL (ref 70–130)
GLUCOSE BLDC GLUCOMTR-MCNC: 85 MG/DL (ref 70–130)
GLUCOSE BLDC GLUCOMTR-MCNC: 97 MG/DL (ref 70–130)
GLUCOSE SERPL-MCNC: 48 MG/DL (ref 65–99)
HCT VFR BLD AUTO: 40.4 % (ref 34–46.6)
HGB BLD-MCNC: 12.5 G/DL (ref 12–15.9)
MAGNESIUM SERPL-MCNC: 1.9 MG/DL (ref 1.6–2.4)
MCH RBC QN AUTO: 29.6 PG (ref 26.6–33)
MCHC RBC AUTO-ENTMCNC: 30.9 G/DL (ref 31.5–35.7)
MCV RBC AUTO: 95.5 FL (ref 79–97)
PLATELET # BLD AUTO: 231 10*3/MM3 (ref 140–450)
PMV BLD AUTO: 10.2 FL (ref 6–12)
POTASSIUM SERPL-SCNC: 4.1 MMOL/L (ref 3.5–5.2)
RBC # BLD AUTO: 4.23 10*6/MM3 (ref 3.77–5.28)
SODIUM SERPL-SCNC: 137 MMOL/L (ref 136–145)
WBC NRBC COR # BLD: 2.41 10*3/MM3 (ref 3.4–10.8)

## 2021-11-28 PROCEDURE — 83735 ASSAY OF MAGNESIUM: CPT | Performed by: INTERNAL MEDICINE

## 2021-11-28 PROCEDURE — 93005 ELECTROCARDIOGRAM TRACING: CPT | Performed by: HOSPITALIST

## 2021-11-28 PROCEDURE — 82962 GLUCOSE BLOOD TEST: CPT

## 2021-11-28 PROCEDURE — 99232 SBSQ HOSP IP/OBS MODERATE 35: CPT | Performed by: HOSPITALIST

## 2021-11-28 PROCEDURE — 63710000001 PREDNISONE PER 5 MG: Performed by: INTERNAL MEDICINE

## 2021-11-28 PROCEDURE — 25010000002 ONDANSETRON PER 1 MG: Performed by: INTERNAL MEDICINE

## 2021-11-28 PROCEDURE — 97116 GAIT TRAINING THERAPY: CPT | Performed by: PHYSICAL THERAPIST

## 2021-11-28 PROCEDURE — 97530 THERAPEUTIC ACTIVITIES: CPT | Performed by: PHYSICAL THERAPIST

## 2021-11-28 PROCEDURE — 25010000002 HEPARIN (PORCINE) PER 1000 UNITS: Performed by: INTERNAL MEDICINE

## 2021-11-28 PROCEDURE — 85027 COMPLETE CBC AUTOMATED: CPT | Performed by: HOSPITALIST

## 2021-11-28 PROCEDURE — 80048 BASIC METABOLIC PNL TOTAL CA: CPT | Performed by: HOSPITALIST

## 2021-11-28 RX ORDER — DEXTROSE MONOHYDRATE 25 G/50ML
INJECTION, SOLUTION INTRAVENOUS
Status: COMPLETED
Start: 2021-11-28 | End: 2021-11-28

## 2021-11-28 RX ORDER — DEXTROSE MONOHYDRATE 25 G/50ML
25 INJECTION, SOLUTION INTRAVENOUS
Status: DISCONTINUED | OUTPATIENT
Start: 2021-11-28 | End: 2021-12-02 | Stop reason: HOSPADM

## 2021-11-28 RX ORDER — DEXTROSE MONOHYDRATE 50 MG/ML
50 INJECTION, SOLUTION INTRAVENOUS CONTINUOUS
Status: DISCONTINUED | OUTPATIENT
Start: 2021-11-28 | End: 2021-11-29

## 2021-11-28 RX ORDER — NICOTINE POLACRILEX 4 MG
15 LOZENGE BUCCAL
Status: DISCONTINUED | OUTPATIENT
Start: 2021-11-28 | End: 2021-12-02 | Stop reason: HOSPADM

## 2021-11-28 RX ADMIN — ONDANSETRON 4 MG: 2 INJECTION INTRAMUSCULAR; INTRAVENOUS at 09:39

## 2021-11-28 RX ADMIN — PANTOPRAZOLE SODIUM 40 MG: 40 TABLET, DELAYED RELEASE ORAL at 06:10

## 2021-11-28 RX ADMIN — MICONAZOLE NITRATE: 20 POWDER TOPICAL at 22:07

## 2021-11-28 RX ADMIN — HEPARIN SODIUM 5000 UNITS: 5000 INJECTION INTRAVENOUS; SUBCUTANEOUS at 06:10

## 2021-11-28 RX ADMIN — HEPARIN SODIUM 5000 UNITS: 5000 INJECTION INTRAVENOUS; SUBCUTANEOUS at 22:07

## 2021-11-28 RX ADMIN — DEXTROSE MONOHYDRATE 50 ML: 25 INJECTION, SOLUTION INTRAVENOUS at 06:17

## 2021-11-28 RX ADMIN — PREDNISONE 5 MG: 5 TABLET ORAL at 09:39

## 2021-11-28 RX ADMIN — CASTOR OIL AND BALSAM, PERU 1 APPLICATION: 788; 87 OINTMENT TOPICAL at 22:06

## 2021-11-28 RX ADMIN — CASTOR OIL AND BALSAM, PERU 1 APPLICATION: 788; 87 OINTMENT TOPICAL at 09:40

## 2021-11-28 RX ADMIN — SODIUM CHLORIDE, PRESERVATIVE FREE 10 ML: 5 INJECTION INTRAVENOUS at 22:09

## 2021-11-28 RX ADMIN — HYDROCODONE BITARTRATE AND ACETAMINOPHEN 1 TABLET: 7.5; 325 TABLET ORAL at 09:45

## 2021-11-28 RX ADMIN — MICONAZOLE NITRATE: 20 POWDER TOPICAL at 09:40

## 2021-11-28 RX ADMIN — DEXTROSE MONOHYDRATE 50 ML/HR: 50 INJECTION, SOLUTION INTRAVENOUS at 06:42

## 2021-11-28 RX ADMIN — FOLIC ACID 1 MG: 1 TABLET ORAL at 09:39

## 2021-11-28 RX ADMIN — METOPROLOL TARTRATE 25 MG: 25 TABLET, FILM COATED ORAL at 22:14

## 2021-11-29 ENCOUNTER — APPOINTMENT (OUTPATIENT)
Dept: CARDIOLOGY | Facility: HOSPITAL | Age: 66
End: 2021-11-29

## 2021-11-29 LAB
BH CV ECHO MEAS - AI DEC SLOPE: 144 CM/SEC^2
BH CV ECHO MEAS - AI MAX PG: 56.1 MMHG
BH CV ECHO MEAS - AI MAX VEL: 372.3 CM/SEC
BH CV ECHO MEAS - AI P1/2T: 757.1 MSEC
BH CV ECHO MEAS - AO MAX PG (FULL): 13.2 MMHG
BH CV ECHO MEAS - AO MAX PG: 23.1 MMHG
BH CV ECHO MEAS - AO MEAN PG (FULL): 7.8 MMHG
BH CV ECHO MEAS - AO MEAN PG: 12.3 MMHG
BH CV ECHO MEAS - AO ROOT AREA (BSA CORRECTED): 2.1
BH CV ECHO MEAS - AO ROOT AREA: 7 CM^2
BH CV ECHO MEAS - AO ROOT DIAM: 3 CM
BH CV ECHO MEAS - AO V2 MAX: 240.5 CM/SEC
BH CV ECHO MEAS - AO V2 MEAN: 159.7 CM/SEC
BH CV ECHO MEAS - AO V2 VTI: 37.1 CM
BH CV ECHO MEAS - ASC AORTA: 2.7 CM
BH CV ECHO MEAS - AVA(I,A): 1.6 CM^2
BH CV ECHO MEAS - AVA(I,D): 1.6 CM^2
BH CV ECHO MEAS - AVA(V,A): 1.9 CM^2
BH CV ECHO MEAS - AVA(V,D): 1.9 CM^2
BH CV ECHO MEAS - BSA(HAYCOCK): 1.5 M^2
BH CV ECHO MEAS - BSA: 1.4 M^2
BH CV ECHO MEAS - BZI_BMI: 26.5 KILOGRAMS/M^2
BH CV ECHO MEAS - BZI_METRIC_HEIGHT: 142.2 CM
BH CV ECHO MEAS - BZI_METRIC_WEIGHT: 53.5 KG
BH CV ECHO MEAS - EDV(CUBED): 15.2 ML
BH CV ECHO MEAS - EDV(MOD-SP2): 27 ML
BH CV ECHO MEAS - EDV(MOD-SP4): 34 ML
BH CV ECHO MEAS - EDV(TEICH): 21.8 ML
BH CV ECHO MEAS - EF(CUBED): 69.5 %
BH CV ECHO MEAS - EF(MOD-BP): 65 %
BH CV ECHO MEAS - EF(MOD-SP2): 59.3 %
BH CV ECHO MEAS - EF(MOD-SP4): 67.6 %
BH CV ECHO MEAS - EF(TEICH): 63.4 %
BH CV ECHO MEAS - ESV(CUBED): 4.6 ML
BH CV ECHO MEAS - ESV(MOD-SP2): 11 ML
BH CV ECHO MEAS - ESV(MOD-SP4): 11 ML
BH CV ECHO MEAS - ESV(TEICH): 8 ML
BH CV ECHO MEAS - FS: 32.7 %
BH CV ECHO MEAS - IVS/LVPW: 1.1
BH CV ECHO MEAS - IVSD: 1.4 CM
BH CV ECHO MEAS - LA DIMENSION: 3.4 CM
BH CV ECHO MEAS - LA/AO: 1.1
BH CV ECHO MEAS - LAD MAJOR: 6.1 CM
BH CV ECHO MEAS - LAT PEAK E' VEL: 4.4 CM/SEC
BH CV ECHO MEAS - LATERAL E/E' RATIO: 20.9
BH CV ECHO MEAS - LV DIASTOLIC VOL/BSA (35-75): 24 ML/M^2
BH CV ECHO MEAS - LV IVRT: 0.11 SEC
BH CV ECHO MEAS - LV MASS(C)D: 101.1 GRAMS
BH CV ECHO MEAS - LV MASS(C)DI: 71.3 GRAMS/M^2
BH CV ECHO MEAS - LV MAX PG: 9.9 MMHG
BH CV ECHO MEAS - LV MEAN PG: 4.5 MMHG
BH CV ECHO MEAS - LV SYSTOLIC VOL/BSA (12-30): 7.8 ML/M^2
BH CV ECHO MEAS - LV V1 MAX: 157.3 CM/SEC
BH CV ECHO MEAS - LV V1 MEAN: 96.3 CM/SEC
BH CV ECHO MEAS - LV V1 VTI: 21 CM
BH CV ECHO MEAS - LVIDD: 2.5 CM
BH CV ECHO MEAS - LVIDS: 1.7 CM
BH CV ECHO MEAS - LVLD AP2: 5.8 CM
BH CV ECHO MEAS - LVLD AP4: 6.2 CM
BH CV ECHO MEAS - LVLS AP2: 5.5 CM
BH CV ECHO MEAS - LVLS AP4: 5.2 CM
BH CV ECHO MEAS - LVOT AREA (M): 3.1 CM^2
BH CV ECHO MEAS - LVOT AREA: 2.9 CM^2
BH CV ECHO MEAS - LVOT DIAM: 1.9 CM
BH CV ECHO MEAS - LVPWD: 1.3 CM
BH CV ECHO MEAS - MED PEAK E' VEL: 5.3 CM/SEC
BH CV ECHO MEAS - MEDIAL E/E' RATIO: 17.6
BH CV ECHO MEAS - MR ALIAS VEL: 29.7 CM/SEC
BH CV ECHO MEAS - MR MAX PG: 94 MMHG
BH CV ECHO MEAS - MR MAX VEL: 482 CM/SEC
BH CV ECHO MEAS - MR MEAN PG: 66.7 MMHG
BH CV ECHO MEAS - MR MEAN VEL: 388.6 CM/SEC
BH CV ECHO MEAS - MR VTI: 149.2 CM
BH CV ECHO MEAS - MV AREA (1 DIAM): 2.7 CM^2
BH CV ECHO MEAS - MV DEC SLOPE: 540.6 CM/SEC^2
BH CV ECHO MEAS - MV DEC TIME: 0.17 SEC
BH CV ECHO MEAS - MV DIAM: 1.9 CM
BH CV ECHO MEAS - MV E MAX VEL: 94.3 CM/SEC
BH CV ECHO MEAS - MV FLOW AREA(1DIAM): 2.7 CM^2
BH CV ECHO MEAS - MV MAX PG: 5.5 MMHG
BH CV ECHO MEAS - MV MEAN PG: 2.1 MMHG
BH CV ECHO MEAS - MV P1/2T MAX VEL: 112.2 CM/SEC
BH CV ECHO MEAS - MV P1/2T: 60.8 MSEC
BH CV ECHO MEAS - MV V2 MAX: 117 CM/SEC
BH CV ECHO MEAS - MV V2 MEAN: 62.8 CM/SEC
BH CV ECHO MEAS - MV V2 VTI: 18.8 CM
BH CV ECHO MEAS - MVA P1/2T LCG: 2 CM^2
BH CV ECHO MEAS - MVA(P1/2T): 3.6 CM^2
BH CV ECHO MEAS - MVA(VTI): 3.2 CM^2
BH CV ECHO MEAS - PA ACC SLOPE: 923.9 CM/SEC^2
BH CV ECHO MEAS - PA ACC TIME: 0.08 SEC
BH CV ECHO MEAS - PA MAX PG: 2.2 MMHG
BH CV ECHO MEAS - PA PR(ACCEL): 44.1 MMHG
BH CV ECHO MEAS - PA V2 MAX: 74.7 CM/SEC
BH CV ECHO MEAS - RAP SYSTOLE: 3 MMHG
BH CV ECHO MEAS - RF(MV,AO)(1 DIAM): -4.1
BH CV ECHO MEAS - RF(MV,LVOT)(1DIAM): -0.2
BH CV ECHO MEAS - RVSP: 25 MMHG
BH CV ECHO MEAS - SI(AO): 183.7 ML/M^2
BH CV ECHO MEAS - SI(CUBED): 7.4 ML/M^2
BH CV ECHO MEAS - SI(LVOT): 42.8 ML/M^2
BH CV ECHO MEAS - SI(MOD-SP2): 11.3 ML/M^2
BH CV ECHO MEAS - SI(MOD-SP4): 16.2 ML/M^2
BH CV ECHO MEAS - SI(MV 1 DIAM): 35.7 ML/M^2
BH CV ECHO MEAS - SI(TEICH): 9.7 ML/M^2
BH CV ECHO MEAS - SV(AO): 260.7 ML
BH CV ECHO MEAS - SV(CUBED): 10.5 ML
BH CV ECHO MEAS - SV(LVOT): 60.7 ML
BH CV ECHO MEAS - SV(MOD-SP2): 16 ML
BH CV ECHO MEAS - SV(MOD-SP4): 23 ML
BH CV ECHO MEAS - SV(MV 1 DIAM): 50.6 ML
BH CV ECHO MEAS - SV(TEICH): 13.8 ML
BH CV ECHO MEAS - TAPSE (>1.6): 1.5 CM
BH CV ECHO MEAS - TR MAX PG: 22 MMHG
BH CV ECHO MEAS - TR MAX VEL: 228.8 CM/SEC
BH CV ECHO MEASUREMENTS AVERAGE E/E' RATIO: 19.44
BH CV VAS BP RIGHT ARM: NORMAL MMHG
BH CV XLRA - RV BASE: 3.2 CM
BH CV XLRA - RV LENGTH: 5.2 CM
BH CV XLRA - RV MID: 2 CM
BH CV XLRA - TDI S': 12.1 CM/SEC
GLUCOSE BLDC GLUCOMTR-MCNC: 102 MG/DL (ref 70–130)
GLUCOSE BLDC GLUCOMTR-MCNC: 68 MG/DL (ref 70–130)
GLUCOSE BLDC GLUCOMTR-MCNC: 72 MG/DL (ref 70–130)
GLUCOSE BLDC GLUCOMTR-MCNC: 90 MG/DL (ref 70–130)
LEFT ATRIUM VOLUME INDEX: 59.2 ML/M^2
LEFT ATRIUM VOLUME: 84 ML
MAGNESIUM SERPL-MCNC: 1.7 MG/DL (ref 1.6–2.4)
MAXIMAL PREDICTED HEART RATE: 156 BPM
POTASSIUM SERPL-SCNC: 3.7 MMOL/L (ref 3.5–5.2)
QT INTERVAL: 330 MS
QTC INTERVAL: 474 MS
STRESS TARGET HR: 133 BPM
TSH SERPL DL<=0.05 MIU/L-ACNC: 1.91 UIU/ML (ref 0.27–4.2)

## 2021-11-29 PROCEDURE — 93306 TTE W/DOPPLER COMPLETE: CPT

## 2021-11-29 PROCEDURE — 25010000002 HEPARIN (PORCINE) PER 1000 UNITS: Performed by: INTERNAL MEDICINE

## 2021-11-29 PROCEDURE — 84132 ASSAY OF SERUM POTASSIUM: CPT | Performed by: INTERNAL MEDICINE

## 2021-11-29 PROCEDURE — 63710000001 PREDNISONE PER 5 MG: Performed by: INTERNAL MEDICINE

## 2021-11-29 PROCEDURE — 83735 ASSAY OF MAGNESIUM: CPT | Performed by: INTERNAL MEDICINE

## 2021-11-29 PROCEDURE — 84443 ASSAY THYROID STIM HORMONE: CPT | Performed by: HOSPITALIST

## 2021-11-29 PROCEDURE — 99233 SBSQ HOSP IP/OBS HIGH 50: CPT | Performed by: HOSPITALIST

## 2021-11-29 PROCEDURE — 82962 GLUCOSE BLOOD TEST: CPT

## 2021-11-29 PROCEDURE — 93306 TTE W/DOPPLER COMPLETE: CPT | Performed by: INTERNAL MEDICINE

## 2021-11-29 RX ORDER — GRANULES FOR ORAL 3 G/1
3 POWDER ORAL ONCE
Status: COMPLETED | OUTPATIENT
Start: 2021-11-30 | End: 2021-11-30

## 2021-11-29 RX ORDER — HYDROCODONE BITARTRATE AND ACETAMINOPHEN 7.5; 325 MG/1; MG/1
1 TABLET ORAL EVERY 6 HOURS PRN
Status: DISPENSED | OUTPATIENT
Start: 2021-11-29 | End: 2021-12-01

## 2021-11-29 RX ORDER — GRANULES FOR ORAL 3 G/1
3 POWDER ORAL ONCE
Status: DISCONTINUED | OUTPATIENT
Start: 2021-11-29 | End: 2021-11-29

## 2021-11-29 RX ADMIN — METOPROLOL TARTRATE 25 MG: 25 TABLET, FILM COATED ORAL at 09:03

## 2021-11-29 RX ADMIN — MICONAZOLE NITRATE: 20 POWDER TOPICAL at 20:15

## 2021-11-29 RX ADMIN — PANTOPRAZOLE SODIUM 40 MG: 40 TABLET, DELAYED RELEASE ORAL at 05:18

## 2021-11-29 RX ADMIN — FOLIC ACID 1 MG: 1 TABLET ORAL at 09:03

## 2021-11-29 RX ADMIN — METOPROLOL TARTRATE 25 MG: 25 TABLET, FILM COATED ORAL at 20:32

## 2021-11-29 RX ADMIN — HEPARIN SODIUM 5000 UNITS: 5000 INJECTION INTRAVENOUS; SUBCUTANEOUS at 05:18

## 2021-11-29 RX ADMIN — MICONAZOLE NITRATE: 20 POWDER TOPICAL at 09:05

## 2021-11-29 RX ADMIN — HYDROCODONE BITARTRATE AND ACETAMINOPHEN 1 TABLET: 7.5; 325 TABLET ORAL at 23:30

## 2021-11-29 RX ADMIN — HEPARIN SODIUM 5000 UNITS: 5000 INJECTION INTRAVENOUS; SUBCUTANEOUS at 20:32

## 2021-11-29 RX ADMIN — DEXTROSE MONOHYDRATE 50 ML/HR: 50 INJECTION, SOLUTION INTRAVENOUS at 01:08

## 2021-11-29 RX ADMIN — HYDROCODONE BITARTRATE AND ACETAMINOPHEN 1 TABLET: 7.5; 325 TABLET ORAL at 05:26

## 2021-11-29 RX ADMIN — PREDNISONE 5 MG: 5 TABLET ORAL at 09:03

## 2021-11-29 RX ADMIN — CASTOR OIL AND BALSAM, PERU 1 APPLICATION: 788; 87 OINTMENT TOPICAL at 09:05

## 2021-11-29 RX ADMIN — HEPARIN SODIUM 5000 UNITS: 5000 INJECTION INTRAVENOUS; SUBCUTANEOUS at 13:19

## 2021-11-30 LAB
GLUCOSE BLDC GLUCOMTR-MCNC: 69 MG/DL (ref 70–130)
GLUCOSE BLDC GLUCOMTR-MCNC: 70 MG/DL (ref 70–130)
GLUCOSE BLDC GLUCOMTR-MCNC: 79 MG/DL (ref 70–130)
GLUCOSE BLDC GLUCOMTR-MCNC: 86 MG/DL (ref 70–130)
MAGNESIUM SERPL-MCNC: 1.6 MG/DL (ref 1.6–2.4)
POTASSIUM SERPL-SCNC: 3.6 MMOL/L (ref 3.5–5.2)

## 2021-11-30 PROCEDURE — 63710000001 PREDNISONE PER 5 MG: Performed by: INTERNAL MEDICINE

## 2021-11-30 PROCEDURE — 82962 GLUCOSE BLOOD TEST: CPT

## 2021-11-30 PROCEDURE — 83735 ASSAY OF MAGNESIUM: CPT | Performed by: INTERNAL MEDICINE

## 2021-11-30 PROCEDURE — 99232 SBSQ HOSP IP/OBS MODERATE 35: CPT | Performed by: HOSPITALIST

## 2021-11-30 PROCEDURE — 84132 ASSAY OF SERUM POTASSIUM: CPT | Performed by: INTERNAL MEDICINE

## 2021-11-30 PROCEDURE — 97116 GAIT TRAINING THERAPY: CPT

## 2021-11-30 PROCEDURE — 97530 THERAPEUTIC ACTIVITIES: CPT

## 2021-11-30 PROCEDURE — 97110 THERAPEUTIC EXERCISES: CPT

## 2021-11-30 PROCEDURE — 97165 OT EVAL LOW COMPLEX 30 MIN: CPT

## 2021-11-30 PROCEDURE — 25010000002 HEPARIN (PORCINE) PER 1000 UNITS: Performed by: INTERNAL MEDICINE

## 2021-11-30 PROCEDURE — 0 MAGNESIUM SULFATE 4 GM/100ML SOLUTION: Performed by: INTERNAL MEDICINE

## 2021-11-30 RX ADMIN — MICONAZOLE NITRATE: 20 POWDER TOPICAL at 08:24

## 2021-11-30 RX ADMIN — PREDNISONE 5 MG: 5 TABLET ORAL at 08:23

## 2021-11-30 RX ADMIN — POTASSIUM CHLORIDE 40 MEQ: 750 CAPSULE, EXTENDED RELEASE ORAL at 10:42

## 2021-11-30 RX ADMIN — MICONAZOLE NITRATE: 20 POWDER TOPICAL at 21:01

## 2021-11-30 RX ADMIN — METOPROLOL TARTRATE 25 MG: 25 TABLET, FILM COATED ORAL at 21:01

## 2021-11-30 RX ADMIN — CASTOR OIL AND BALSAM, PERU 1 APPLICATION: 788; 87 OINTMENT TOPICAL at 21:01

## 2021-11-30 RX ADMIN — CASTOR OIL AND BALSAM, PERU 1 APPLICATION: 788; 87 OINTMENT TOPICAL at 08:25

## 2021-11-30 RX ADMIN — HYDROCODONE BITARTRATE AND ACETAMINOPHEN 1 TABLET: 7.5; 325 TABLET ORAL at 15:13

## 2021-11-30 RX ADMIN — BISACODYL 10 MG: 5 TABLET, COATED ORAL at 08:23

## 2021-11-30 RX ADMIN — MAGNESIUM SULFATE HEPTAHYDRATE 4 G: 40 INJECTION, SOLUTION INTRAVENOUS at 10:42

## 2021-11-30 RX ADMIN — FOLIC ACID 1 MG: 1 TABLET ORAL at 08:23

## 2021-11-30 RX ADMIN — HEPARIN SODIUM 5000 UNITS: 5000 INJECTION INTRAVENOUS; SUBCUTANEOUS at 21:01

## 2021-11-30 RX ADMIN — POTASSIUM CHLORIDE 40 MEQ: 750 CAPSULE, EXTENDED RELEASE ORAL at 15:08

## 2021-11-30 RX ADMIN — CASTOR OIL AND BALSAM, PERU 1 APPLICATION: 788; 87 OINTMENT TOPICAL at 00:54

## 2021-11-30 RX ADMIN — HEPARIN SODIUM 5000 UNITS: 5000 INJECTION INTRAVENOUS; SUBCUTANEOUS at 15:08

## 2021-11-30 RX ADMIN — PANTOPRAZOLE SODIUM 40 MG: 40 TABLET, DELAYED RELEASE ORAL at 05:26

## 2021-11-30 RX ADMIN — GRANULES FOR ORAL SOLUTION 3 G: 3 POWDER ORAL at 10:42

## 2021-11-30 RX ADMIN — HEPARIN SODIUM 5000 UNITS: 5000 INJECTION INTRAVENOUS; SUBCUTANEOUS at 05:26

## 2021-11-30 RX ADMIN — METOPROLOL TARTRATE 25 MG: 25 TABLET, FILM COATED ORAL at 08:23

## 2021-12-01 LAB
MAGNESIUM SERPL-MCNC: 2 MG/DL (ref 1.6–2.4)
POTASSIUM SERPL-SCNC: 4.7 MMOL/L (ref 3.5–5.2)

## 2021-12-01 PROCEDURE — 63710000001 PREDNISONE PER 5 MG: Performed by: INTERNAL MEDICINE

## 2021-12-01 PROCEDURE — 84132 ASSAY OF SERUM POTASSIUM: CPT | Performed by: INTERNAL MEDICINE

## 2021-12-01 PROCEDURE — 25010000002 HEPARIN (PORCINE) PER 1000 UNITS: Performed by: INTERNAL MEDICINE

## 2021-12-01 PROCEDURE — 99233 SBSQ HOSP IP/OBS HIGH 50: CPT | Performed by: INTERNAL MEDICINE

## 2021-12-01 PROCEDURE — 83735 ASSAY OF MAGNESIUM: CPT | Performed by: INTERNAL MEDICINE

## 2021-12-01 RX ORDER — SACCHAROMYCES BOULARDII 250 MG
250 CAPSULE ORAL 2 TIMES DAILY
Status: DISCONTINUED | OUTPATIENT
Start: 2021-12-01 | End: 2021-12-02 | Stop reason: HOSPADM

## 2021-12-01 RX ADMIN — PANTOPRAZOLE SODIUM 40 MG: 40 TABLET, DELAYED RELEASE ORAL at 05:34

## 2021-12-01 RX ADMIN — MICONAZOLE NITRATE: 20 POWDER TOPICAL at 21:54

## 2021-12-01 RX ADMIN — HYDROCODONE BITARTRATE AND ACETAMINOPHEN 1 TABLET: 7.5; 325 TABLET ORAL at 05:34

## 2021-12-01 RX ADMIN — CASTOR OIL AND BALSAM, PERU 1 APPLICATION: 788; 87 OINTMENT TOPICAL at 21:54

## 2021-12-01 RX ADMIN — HEPARIN SODIUM 5000 UNITS: 5000 INJECTION INTRAVENOUS; SUBCUTANEOUS at 14:49

## 2021-12-01 RX ADMIN — PREDNISONE 5 MG: 5 TABLET ORAL at 09:01

## 2021-12-01 RX ADMIN — SODIUM CHLORIDE, PRESERVATIVE FREE 10 ML: 5 INJECTION INTRAVENOUS at 09:01

## 2021-12-01 RX ADMIN — MICONAZOLE NITRATE: 20 POWDER TOPICAL at 09:02

## 2021-12-01 RX ADMIN — CASTOR OIL AND BALSAM, PERU 1 APPLICATION: 788; 87 OINTMENT TOPICAL at 09:01

## 2021-12-01 RX ADMIN — Medication 250 MG: at 09:00

## 2021-12-01 RX ADMIN — Medication 250 MG: at 21:52

## 2021-12-01 RX ADMIN — HEPARIN SODIUM 5000 UNITS: 5000 INJECTION INTRAVENOUS; SUBCUTANEOUS at 05:34

## 2021-12-01 RX ADMIN — HYDROCODONE BITARTRATE AND ACETAMINOPHEN 1 TABLET: 7.5; 325 TABLET ORAL at 21:52

## 2021-12-01 RX ADMIN — METOPROLOL TARTRATE 25 MG: 25 TABLET, FILM COATED ORAL at 21:52

## 2021-12-01 RX ADMIN — FOLIC ACID 1 MG: 1 TABLET ORAL at 09:01

## 2021-12-01 RX ADMIN — HEPARIN SODIUM 5000 UNITS: 5000 INJECTION INTRAVENOUS; SUBCUTANEOUS at 21:52

## 2021-12-01 NOTE — CASE MANAGEMENT/SOCIAL WORK
Continued Stay Note  [facility]     Patient Name: Karen Larkin  MRN: [id number]  Today's Date: 12/1/2021    Admit Date: 11/21/2021     Discharge Plan     Row Name 12/01/21 1348       Plan    Plan discharge plan    Plan Comments Per Cydney at [facility], they are unable to offer pt a bed. [facility] can offer pt a bed pending insurance approval and have submitted precert. CM updated pt and she is agreeable. CM will cont to follow.    Final Discharge Disposition Code 03 - skilled nursing facility (SNF)    Row Name 12/01/21 1215       Plan    Plan discharge plan    Plan Comments Both [facility] and [facility] still reviewing referral.  CM spoke with Katerine with [facility] and Jocelyne at [facility]. Both facilities will get with CM as soon as they have a decision on whether they can offer pt a bed. CM will cont to follow.    Final Discharge Disposition Code 03 - skilled nursing facility (SNF)               Discharge Codes    No documentation.               Expected Discharge Date and Time     Expected Discharge Date Expected Discharge Time    Dec 3, 2021             Estella Franklin, RN

## 2021-12-01 NOTE — PROGRESS NOTES
[facility] Medicine Services  PROGRESS NOTE    Patient Name: Karen Larkin  : 1955  MRN: [id number]    Date of Admission: 2021  Primary Care Physician: Margarita Mcgovern MD    Subjective   Subjective     CC: f/u UTI    HPI: Up in bed eating. Says overall is doing well. Still very weak. Having loose stools at night.     ROS:  Gen- No fevers, chills  CV- No chest pain, palpitations  Resp- No cough, dyspnea  GI- No N/V/D, abd pain     Objective   Objective     Vital Signs:   Temp:  [97.6 °F (36.4 °C)-98.7 °F (37.1 °C)] 97.7 °F (36.5 °C)  Heart Rate:  [] 94  Resp:  [17-19] 18  BP: (100-111)/(70-91) 111/91     Physical Exam:  Constitutional: No acute distress, awake, alert, frail appearing  HENT: NCAT, mucous membranes moist  Respiratory: Clear to auscultation bilaterally, respiratory effort normal   Cardiovascular: RRR, no murmurs, rubs, or gallops  Gastrointestinal: Positive bowel sounds, soft, nontender, nondistended  Musculoskeletal: No bilateral ankle edema  Psychiatric: Appropriate affect, cooperative  Neurologic: Oriented x 3, strength symmetric in all extremities, Cranial Nerves grossly intact to confrontation, speech clear  Skin: No rashes    Results Reviewed:  LAB RESULTS:      Lab 21  0441   WBC 2.41*   HEMOGLOBIN 12.5   HEMATOCRIT 40.4   PLATELETS 231   MCV 95.5         Lab 21  0425 21  0419 21  1227 21  0508 21  0441 21  1511   SODIUM  --   --   --   --  137  --    POTASSIUM 4.7 3.6  --  3.7 4.1 4.6   CHLORIDE  --   --   --   --  104  --    CO2  --   --   --   --  23.0  --    ANION GAP  --   --   --   --  10.0  --    BUN  --   --   --   --  7*  --    CREATININE  --   --   --   --  0.40*  --    GLUCOSE  --   --   --   --  48*  --    CALCIUM  --   --   --   --  8.4*  --    MAGNESIUM 2.0 1.6  --  1.7 1.9 2.1   TSH  --   --  1.910  --   --   --                          Brief Urine Lab Results  (Last result in the past 365  days)      Color   Clarity   Blood   Leuk Est   Nitrite   Protein   CREAT   Urine HCG        11/21/21 1237 Yellow   Turbid   Large (3+)   Large (3+)   Positive   30 mg/dL (1+)                 Microbiology Results Abnormal     Procedure Component Value - Date/Time    Blood Culture - Blood, Arm, Left [[id number]]  (Normal) Collected: 11/21/21 1248    Lab Status: Final result Specimen: Blood from Arm, Left Updated: 11/26/21 1330     Blood Culture No growth at 5 days    Blood Culture - Blood, Hand, Left [[id number]]  (Normal) Collected: 11/21/21 1248    Lab Status: Final result Specimen: Blood from Hand, Left Updated: 11/26/21 1330     Blood Culture No growth at 5 days    MRSA Screen, PCR (Inpatient) - Swab, Nares [[id number]]  (Normal) Collected: 11/21/21 1236    Lab Status: Final result Specimen: Swab from Nares Updated: 11/21/21 1440     MRSA PCR Negative    Narrative:      MRSA Negative    COVID PRE-OP / PRE-PROCEDURE SCREENING ORDER (NO ISOLATION) - Swab, Nasopharynx [[id number]]  (Normal) Collected: 11/21/21 1236    Lab Status: Final result Specimen: Swab from Nasopharynx Updated: 11/21/21 1322    Narrative:      The following orders were created for panel order COVID PRE-OP / PRE-PROCEDURE SCREENING ORDER (NO ISOLATION) - Swab, Nasopharynx.  Procedure                               Abnormality         Status                     ---------                               -----------         ------                     COVID-19, ABBOTT IN-HOUS...[[id number]]  Normal              Final result                 Please view results for these tests on the individual orders.    COVID-19, ABBOTT IN-HOUSE,NASAL Swab (NO TRANSPORT MEDIA) 2 HR TAT - Swab, Nasopharynx [[id number]]  (Normal) Collected: 11/21/21 1236    Lab Status: Final result Specimen: Swab from Nasopharynx Updated: 11/21/21 1322     COVID19 Presumptive Negative    Narrative:      Fact sheet for providers: https://www.fda.gov/media/051204/download     Fact sheet for  patients: https://www.[employer].gov/media/495658/download    Test performed by PCR.  If inconsistent with clinical signs and symptoms patient should be tested with different authorized molecular test.        CT head personally reviewed showing no acute disease. Agree with interpretation.    Adult Transthoracic Echo Complete W/ Cont if Necessary Per Protocol    Result Date: 11/29/2021  · Left ventricular ejection fraction appears to be 61 - 65%. Left ventricular systolic function is normal. · Left ventricular wall thickness is consistent with mild concentric hypertrophy. · Left ventricular diastolic dysfunction is noted. · Left atrial volume is severely increased. · There is calcification of the aortic valve. · Estimated right ventricular systolic pressure from tricuspid regurgitation is normal (<35 mmHg). Calculated right ventricular systolic pressure from tricuspid regurgitation is 25 mmHg. · Moderate mitral valve regurgitation is present.        Results for orders placed during the hospital encounter of 11/21/21    Adult Transthoracic Echo Complete W/ Cont if Necessary Per Protocol    Interpretation Summary  · Left ventricular ejection fraction appears to be 61 - 65%. Left ventricular systolic function is normal.  · Left ventricular wall thickness is consistent with mild concentric hypertrophy.  · Left ventricular diastolic dysfunction is noted.  · Left atrial volume is severely increased.  · There is calcification of the aortic valve.  · Estimated right ventricular systolic pressure from tricuspid regurgitation is normal (<35 mmHg). Calculated right ventricular systolic pressure from tricuspid regurgitation is 25 mmHg.  · Moderate mitral valve regurgitation is present.      I have reviewed the medications:  Scheduled Meds:bisacodyl, 10 mg, Oral, Daily  castor oil-balsam peru, 1 application, Topical, Q12H  folic acid, 1 mg, Oral, Daily  heparin (porcine), 5,000 Units, Subcutaneous, Q8H  metoprolol tartrate, 25 mg, Oral,  Q12H  miconazole, , Topical, Q12H  pantoprazole, 40 mg, Oral, Q AM  polyethylene glycol, 17 g, Oral, Daily  predniSONE, 5 mg, Oral, Daily  saccharomyces boulardii, 250 mg, Oral, BID  sodium chloride, 10 mL, Intravenous, Q12H      Continuous Infusions:   PRN Meds:.•  acetaminophen  •  dextrose  •  dextrose  •  glucagon (human recombinant)  •  HYDROcodone-acetaminophen  •  magnesium sulfate **OR** magnesium sulfate **OR** magnesium sulfate  •  ondansetron  •  potassium chloride **OR** potassium chloride **OR** potassium chloride  •  sodium chloride  •  sodium chloride    Assessment/Plan   Assessment & Plan     Active Hospital Problems    Diagnosis  POA   • **Sepsis [A41.9]  Yes   • Severe malnutrition (CMS/HCC) [E43]  Yes   • Chronic pain on Narcotics [G89.29]  Yes   • Pelvic prolapse [N81.9]  Yes   • Hydronephrosis of left kidney [N13.30]  Yes   • Lactic acidemia [E87.2]  Yes   • GERD (gastroesophageal reflux disease) [K21.9]  Yes   • Rheumatoid arthritis [M06.9]  Yes   • Immunosuppression due to chronic steroid use [D84.821, T38.0X5A, Z79.52]  Not Applicable   • Atrial fibrillation (not on OAC) [I48.91]  Yes   • Polycystic liver disease [Q44.6]  Not Applicable      Resolved Hospital Problems    Diagnosis Date Resolved POA   • Septic shock (HCC) [A41.9, R65.21] 11/21/2021 Yes        Brief Hospital Course to date:  Ms. Larkin is a 67yo F with a history of RA on Plaquenil and Prednisone, Afib (not on OAC), polycystic liver disease, pelvic prolapse (s/p failed surgical repair ~1 month ago with repeat surgery planned for 11/24/21) and chronic pain on narcotics who presented to [facility] on 11/21/21 with confusion and fever. She had a CT of the abdomen/pelvis which showed mild left hydronephrosis and hydroureter with air in the urinary bladder wall tracking into the soft tissues. Urinalysis is consistent with a UTI. She is on broad spectrum antibiotics. This is my first day assessing patient's active medical  issues.     AMS  -Due to sepsis. Resolved.      Sepsis  Hydronephrosis on left  UTI  Bladder prolapse  -Completed course of Fosfomycin on 11/30.   -My partner has d/w gynecology, and urology, and Dr. Ann will follow up patient in 2-3 weeks to discuss surgical repair/revision  -Start florastor     Hypoglycemia  -Persists. Continue observing, suspected due to malnutrition.     Liver cyst  -Apparently there is a planned drainage in future, details unknown, but PCP to arrange per patient     Rheumatoid Arthritis  -on Plaquenil and Prednisone     Afib/RVR  -known history, not on AC due to comorbidities  -checked TSH, normal  -ECHO with normal LV function, LA enlargement noted  -on BB, RVR resolved     Decubitus wounds, prolapsed bladder  -wound care  -PT wound care     Weakness/debility  -PT/OT/CM consult, awaiting rehab placement    This patient's problems and plans were partially entered by my partner and updated as appropriate by me 12/01/21.    DVT prophylaxis:  Medical and mechanical DVT prophylaxis orders are present.       AM-PAC 6 Clicks Score (PT): 18 (11/30/21 2050)    Disposition: I expect the patient to be discharged to SNF any time.    CODE STATUS:   Code Status and Medical Interventions:   Ordered at: 11/21/21 1338     Level Of Support Discussed With:    Patient     Code Status (Patient has no pulse and is not breathing):    CPR (Attempt to Resuscitate)     Medical Interventions (Patient has pulse or is breathing):    Full Support       Jayde Owens II, DO  12/01/21

## 2021-12-01 NOTE — PLAN OF CARE
Problem: Skin Injury Risk Increased  Goal: Skin Health and Integrity  Outcome: Ongoing, Progressing  Intervention: Optimize Skin Protection  Recent Flowsheet Documentation  Taken 12/1/2021 0400 by Ashli Mendoza RN  Pressure Reduction Techniques:   weight shift assistance provided   frequent weight shift encouraged   pressure points protected   positioned off wounds  Head of Bed (HOB): HOB at 20-30 degrees  Pressure Reduction Devices:   positioning supports utilized   pressure-redistributing mattress utilized  Skin Protection:   adhesive use limited   incontinence pads utilized   protective footwear used   tubing/devices free from skin contact  Taken 12/1/2021 0200 by Ashli Mendoza RN  Pressure Reduction Techniques:   weight shift assistance provided   frequent weight shift encouraged   positioned off wounds   pressure points protected  Head of Bed (HOB): HOB at 20 degrees  Pressure Reduction Devices:   positioning supports utilized   pressure-redistributing mattress utilized   specialty bed utilized  Skin Protection:   adhesive use limited   incontinence pads utilized   protective footwear used   tubing/devices free from skin contact  Taken 12/1/2021 0000 by Ashli Mendoza RN  Pressure Reduction Techniques:   weight shift assistance provided   frequent weight shift encouraged   positioned off wounds   pressure points protected  Head of Bed (HOB): HOB at 20-30 degrees  Pressure Reduction Devices:   pressure-redistributing mattress utilized   specialty bed utilized   positioning supports utilized  Skin Protection:   adhesive use limited   incontinence pads utilized   protective footwear used   tubing/devices free from skin contact  Taken 11/30/2021 2200 by Ashli Mendoza RN  Pressure Reduction Techniques:   frequent weight shift encouraged   weight shift assistance provided   positioned off wounds   pressure points protected  Head of Bed (HOB): HOB at 20-30 degrees  Pressure Reduction Devices:   positioning supports  utilized   pressure-redistributing mattress utilized   specialty bed utilized  Skin Protection:   adhesive use limited   incontinence pads utilized   protective footwear used   tubing/devices free from skin contact  Taken 11/30/2021 2050 by Ashli Mendoza RN  Pressure Reduction Techniques:   weight shift assistance provided   frequent weight shift encouraged   positioned off wounds   pressure points protected  Head of Bed (HOB): HOB at 20-30 degrees  Pressure Reduction Devices:   specialty bed utilized   pressure-redistributing mattress utilized   positioning supports utilized  Skin Protection:   adhesive use limited   incontinence pads utilized   protective footwear used   tubing/devices free from skin contact   Goal Outcome Evaluation:  Plan of Care Reviewed With: patient

## 2021-12-02 ENCOUNTER — READMISSION MANAGEMENT (OUTPATIENT)
Dept: CALL CENTER | Facility: HOSPITAL | Age: 66
End: 2021-12-02

## 2021-12-02 VITALS
WEIGHT: 117.95 LBS | SYSTOLIC BLOOD PRESSURE: 103 MMHG | DIASTOLIC BLOOD PRESSURE: 80 MMHG | BODY MASS INDEX: 26.53 KG/M2 | OXYGEN SATURATION: 95 % | HEART RATE: 92 BPM | TEMPERATURE: 98 F | HEIGHT: 56 IN | RESPIRATION RATE: 16 BRPM

## 2021-12-02 PROBLEM — E87.20 LACTIC ACIDEMIA: Status: RESOLVED | Noted: 2021-11-21 | Resolved: 2021-12-02

## 2021-12-02 PROBLEM — A41.9 SEPSIS (HCC): Status: RESOLVED | Noted: 2021-11-21 | Resolved: 2021-12-02

## 2021-12-02 PROBLEM — N13.30 HYDRONEPHROSIS OF LEFT KIDNEY: Status: RESOLVED | Noted: 2021-11-21 | Resolved: 2021-12-02

## 2021-12-02 LAB
MAGNESIUM SERPL-MCNC: 1.8 MG/DL (ref 1.6–2.4)
POTASSIUM SERPL-SCNC: 4 MMOL/L (ref 3.5–5.2)

## 2021-12-02 PROCEDURE — 63710000001 PREDNISONE PER 5 MG: Performed by: INTERNAL MEDICINE

## 2021-12-02 PROCEDURE — 83735 ASSAY OF MAGNESIUM: CPT | Performed by: INTERNAL MEDICINE

## 2021-12-02 PROCEDURE — 25010000002 HEPARIN (PORCINE) PER 1000 UNITS: Performed by: INTERNAL MEDICINE

## 2021-12-02 PROCEDURE — 25010000002 ONDANSETRON PER 1 MG: Performed by: INTERNAL MEDICINE

## 2021-12-02 PROCEDURE — 84132 ASSAY OF SERUM POTASSIUM: CPT | Performed by: INTERNAL MEDICINE

## 2021-12-02 PROCEDURE — 99239 HOSP IP/OBS DSCHRG MGMT >30: CPT | Performed by: INTERNAL MEDICINE

## 2021-12-02 RX ORDER — ACETAMINOPHEN 325 MG/1
650 TABLET ORAL EVERY 6 HOURS PRN
Start: 2021-12-02 | End: 2022-03-04 | Stop reason: HOSPADM

## 2021-12-02 RX ORDER — SACCHAROMYCES BOULARDII 250 MG
250 CAPSULE ORAL 2 TIMES DAILY
Start: 2021-12-02

## 2021-12-02 RX ADMIN — ACETAMINOPHEN 650 MG: 325 TABLET, FILM COATED ORAL at 14:07

## 2021-12-02 RX ADMIN — PANTOPRAZOLE SODIUM 40 MG: 40 TABLET, DELAYED RELEASE ORAL at 05:26

## 2021-12-02 RX ADMIN — Medication 250 MG: at 08:08

## 2021-12-02 RX ADMIN — FOLIC ACID 1 MG: 1 TABLET ORAL at 08:08

## 2021-12-02 RX ADMIN — SODIUM CHLORIDE, PRESERVATIVE FREE 10 ML: 5 INJECTION INTRAVENOUS at 08:09

## 2021-12-02 RX ADMIN — PREDNISONE 5 MG: 5 TABLET ORAL at 08:08

## 2021-12-02 RX ADMIN — CASTOR OIL AND BALSAM, PERU 1 APPLICATION: 788; 87 OINTMENT TOPICAL at 08:09

## 2021-12-02 RX ADMIN — METOPROLOL TARTRATE 25 MG: 25 TABLET, FILM COATED ORAL at 08:08

## 2021-12-02 RX ADMIN — MICONAZOLE NITRATE: 20 POWDER TOPICAL at 08:09

## 2021-12-02 RX ADMIN — HEPARIN SODIUM 5000 UNITS: 5000 INJECTION INTRAVENOUS; SUBCUTANEOUS at 05:26

## 2021-12-02 RX ADMIN — ACETAMINOPHEN 650 MG: 325 TABLET, FILM COATED ORAL at 08:08

## 2021-12-02 RX ADMIN — ONDANSETRON 4 MG: 2 INJECTION INTRAMUSCULAR; INTRAVENOUS at 13:34

## 2021-12-02 RX ADMIN — HEPARIN SODIUM 5000 UNITS: 5000 INJECTION INTRAVENOUS; SUBCUTANEOUS at 13:33

## 2021-12-02 NOTE — OUTREACH NOTE
Prep Survey      Responses   [facility] facility patient discharged from? [place]   Is LACE score < 7 ? No   Emergency Room discharge w/ pulse ox? No   Eligibility Not Eligible   What are the reasons patient is not eligible? [facility]  [[facility]]   Does the patient have one of the following disease processes/diagnoses(primary or secondary)? Sepsis   Prep survey completed? Yes          Laine David RN

## 2021-12-02 NOTE — DISCHARGE SUMMARY
[facility] Medicine Services  DISCHARGE SUMMARY    Patient Name: Karen Larkin  : 1955  MRN: [id number]    Date of Admission: 2021 11:56 AM  Date of Discharge: 2021  Primary Care Physician: Margarita Mcgovern MD    Consults     Date and Time Order Name Status Description    2021  8:58 AM Inpatient Urology Consult Completed           Hospital Course     Presenting Problem:   Septic shock (HCC) [A41.9, R65.21]    Active Hospital Problems    Diagnosis  POA   • Severe malnutrition (CMS/HCC) [E43]  Yes   • Chronic pain on Narcotics [G89.29]  Yes   • Pelvic prolapse [N81.9]  Yes   • GERD (gastroesophageal reflux disease) [K21.9]  Yes   • Rheumatoid arthritis [M06.9]  Yes   • Immunosuppression due to chronic steroid use [D84.821, T38.0X5A, Z79.52]  Not Applicable   • Atrial fibrillation (not on OAC) [I48.91]  Yes   • Polycystic liver disease [Q44.6]  Not Applicable      Resolved Hospital Problems    Diagnosis Date Resolved POA   • **Sepsis [A41.9] 2021 Yes   • Hydronephrosis of left kidney [N13.30] 2021 Yes   • Lactic acidemia [E87.2] 2021 Yes   • Septic shock (HCC) [A41.9, R65.21] 2021 Yes          Hospital Course:  Karen Larkin is a 66 y.o. female with a history of RA on Plaquenil and Prednisone, Afib (not on OAC), polycystic liver disease, pelvic prolapse (s/p failed surgical repair ~1 month ago with repeat surgery planned for 21) and chronic pain on narcotics who presented to [facility] on 21 with confusion and fever. She had a CT of the abdomen/pelvis which showed mild left hydronephrosis and hydroureter with air in the urinary bladder wall tracking into the soft tissues. Urinalysis is consistent with a UTI. She is on broad spectrum antibiotics.      AMS  -Due to sepsis. Resolved w/ treatment of below.     Sepsis  Hydronephrosis on left  UTI  Bladder prolapse  -Upon arrival patient admitted to ICU and placed on IV abx and and  gynecology and urology were consulted given her hydronephrosis and bladder prolapse. Dr. Ann will follow up with her in 2 weeks time to discuss repair.   -She completed course of Fosfomycin on 11/30.     Discharge Follow Up Recommendations for outpatient labs/diagnostics:   Dr. Ann in 2 weeks    Day of Discharge     HPI: Up in bed eating. Doing well. Eating well. Looking forward to transfer.     Review of Systems  Gen- No fevers, chills  CV- No chest pain, palpitations  Resp- No cough, dyspnea  GI- No N/V/D, abd pain    Vital Signs:   Temp:  [97.5 °F (36.4 °C)-98 °F (36.7 °C)] 98 °F (36.7 °C)  Heart Rate:  [] 77  Resp:  [16-19] 16  BP: (108-120)/(74-82) 108/74     Physical Exam:  Constitutional: No acute distress, awake, alert, weak appearing  HENT: NCAT, mucous membranes moist  Respiratory: Clear to auscultation bilaterally, respiratory effort normal   Cardiovascular: RRR, no murmurs, rubs, or gallops  Gastrointestinal: Positive bowel sounds, soft, nontender, nondistended  Musculoskeletal: No bilateral ankle edema  Psychiatric: Appropriate affect, cooperative  Neurologic: Oriented x 3, strength symmetric in all extremities, Cranial Nerves grossly intact to confrontation, speech clear  Skin: No rashes    Pertinent  and/or Most Recent Results     LAB RESULTS:      Lab 11/28/21  0441   WBC 2.41*   HEMOGLOBIN 12.5   HEMATOCRIT 40.4   PLATELETS 231   MCV 95.5         Lab 12/02/21  0436 12/01/21  0425 11/30/21  0419 11/29/21  1227 11/29/21  0508 11/28/21  0441   SODIUM  --   --   --   --   --  137   POTASSIUM 4.0 4.7 3.6  --  3.7 4.1   CHLORIDE  --   --   --   --   --  104   CO2  --   --   --   --   --  23.0   ANION GAP  --   --   --   --   --  10.0   BUN  --   --   --   --   --  7*   CREATININE  --   --   --   --   --  0.40*   GLUCOSE  --   --   --   --   --  48*   CALCIUM  --   --   --   --   --  8.4*   MAGNESIUM 1.8 2.0 1.6  --  1.7 1.9   TSH  --   --   --  1.910  --   --                          Brief  Urine Lab Results  (Last result in the past 365 days)      Color   Clarity   Blood   Leuk Est   Nitrite   Protein   CREAT   Urine HCG        11/21/21 1237 Yellow   Turbid   Large (3+)   Large (3+)   Positive   30 mg/dL (1+)               Microbiology Results (last 10 days)     ** No results found for the last 240 hours. **          Adult Transthoracic Echo Complete W/ Cont if Necessary Per Protocol    Result Date: 11/29/2021  · Left ventricular ejection fraction appears to be 61 - 65%. Left ventricular systolic function is normal. · Left ventricular wall thickness is consistent with mild concentric hypertrophy. · Left ventricular diastolic dysfunction is noted. · Left atrial volume is severely increased. · There is calcification of the aortic valve. · Estimated right ventricular systolic pressure from tricuspid regurgitation is normal (<35 mmHg). Calculated right ventricular systolic pressure from tricuspid regurgitation is 25 mmHg. · Moderate mitral valve regurgitation is present.      US Renal Limited    Result Date: 11/28/2021  EXAMINATION: US RENAL LIMITED-  INDICATION: assess resolution of hydroureter  TECHNIQUE: Transverse and sagittal grayscale sonographic evaluation of the bilateral kidneys and bladder supplemented with color Doppler.  COMPARISON: Outside CT abdomen and pelvis 11/21/2021  FINDINGS:  Right kidney:  The right kidney measures 11.8 x 5.7 x 6.5 cm in size and demonstrates normal morphology and echogenicity without evidence of solid cortical renal lesion; small right renal cysts noted on prior CT are not well visualized on this ultrasound exam. Normal renal hilar vascularity on color Doppler assessment. No evidence of hydronephrosis or nephrolithiasis.  Left kidney:  The left kidney measures 14.2 x 5.7 x 6.8 cm and demonstrates normal morphology and echogenicity without evidence of renal cortical atrophy. There is mild hydronephrosis. Numerous left renal cysts are seen measuring up to 2.9 cm in  diameter. Normal renal vascularity on color Doppler assessment. No evidence of nephrolithiasis.  Bladder: The bladder is poorly visualized owing to shadowing gas. Outside CT demonstrates loss of intraluminal gas within the bladder. A Canales catheter is in place.       Persistent mild left hydronephrosis as seen on prior outside CT abdomen and pelvis from 11/21/2021. Multiple left renal cysts. Multiple right renal cysts seen on outside CT are not well appreciated on today's exam. The bladder is poorly evaluated owing to extensive shadowing gas, possibly gas within the bladder lumen as seen on recent CT, and indwelling Canales catheter in place. Consider CT for further evaluation as warranted.   This report was finalized on 11/28/2021 8:28 AM by Tejas Shaver MD.      XR Chest 1 View    Result Date: 11/22/2021  EXAMINATION: XR CHEST 1 VW-  INDICATION: PICC placement.  COMPARISON: None.  FINDINGS: AP view of the chest reveal cardiac size borderline enlarged. Mild increase in central vascular congestion. No pneumothorax or pleural effusion. Degenerative changes of the spine. Right arm PICC terminates in the distal SVC.         Right arm PICC terminates within the distal SVC.  D:  11/22/2021 E:  11/22/2021  This report was finalized on 11/22/2021 3:04 PM by Dr. Donta Gonzalez.      XR Outside Films    Result Date: 11/21/2021  This procedure was auto-finalized with no dictation required.    CT Outside Films    Result Date: 11/21/2021  This procedure was auto-finalized with no dictation required.    CT Outside Films    Result Date: 11/21/2021  This procedure was auto-finalized with no dictation required.              Results for orders placed during the hospital encounter of 11/21/21    Adult Transthoracic Echo Complete W/ Cont if Necessary Per Protocol    Interpretation Summary  · Left ventricular ejection fraction appears to be 61 - 65%. Left ventricular systolic function is normal.  · Left ventricular wall thickness is  consistent with mild concentric hypertrophy.  · Left ventricular diastolic dysfunction is noted.  · Left atrial volume is severely increased.  · There is calcification of the aortic valve.  · Estimated right ventricular systolic pressure from tricuspid regurgitation is normal (<35 mmHg). Calculated right ventricular systolic pressure from tricuspid regurgitation is 25 mmHg.  · Moderate mitral valve regurgitation is present.      Plan for Follow-up of Pending Labs/Results:    Discharge Details        Discharge Medications      New Medications      Instructions Start Date   acetaminophen 325 MG tablet  Commonly known as: TYLENOL   650 mg, Oral, Every 6 Hours PRN      metoprolol tartrate 25 MG tablet  Commonly known as: LOPRESSOR   25 mg, Oral, Every 12 Hours Scheduled      saccharomyces boulardii 250 MG capsule  Commonly known as: FLORASTOR   250 mg, Oral, 2 Times Daily         Continue These Medications      Instructions Start Date   celecoxib 200 MG capsule  Commonly known as: CeleBREX   200 mg, Oral, Daily      ferrous sulfate 325 (65 FE) MG tablet   325 mg, Oral, 2 times daily      folic acid 1 MG tablet  Commonly known as: FOLVITE   1 mg, Daily      hydroxychloroquine 200 MG tablet  Commonly known as: PLAQUENIL   200 mg, Oral, Daily      omeprazole 20 MG capsule  Commonly known as: priLOSEC   20 mg, Oral, 2 times daily      predniSONE 1 MG tablet  Commonly known as: DELTASONE   5 mg, Oral, Daily         Stop These Medications    furosemide 20 MG tablet  Commonly known as: LASIX     HYDROcodone-acetaminophen 5-325 MG per tablet  Commonly known as: NORCO            No Known Allergies      Discharge Disposition:  Home or Self Care    Diet:  Hospital:  Diet Order   Procedures   • Diet Regular       Restrictions or Other Recommendations:         CODE STATUS:    Code Status and Medical Interventions:   Ordered at: 11/21/21 0440     Level Of Support Discussed With:    Patient     Code Status (Patient has no pulse and is  not breathing):    CPR (Attempt to Resuscitate)     Medical Interventions (Patient has pulse or is breathing):    Full Support       No future appointments.    Additional Instructions for the Follow-ups that You Need to Schedule     Discharge Follow-up with Specialty: Dr. Ann; 2 Weeks   As directed      Specialty: Dr. Ann    Follow Up: 2 Weeks                     Jayde Owens II, DO  12/02/21      Time Spent on Discharge:  I spent  33  minutes on this discharge activity which included: face-to-face encounter with the patient, reviewing the data in the system, coordination of the care with the nursing staff as well as consultants, documentation, and entering orders.

## 2021-12-02 NOTE — NURSING NOTE
Prior to central line removal, order for the removal of catheter was verified, patient was assessed, necessary materials were gathered and Patient Educated.    Patient was positioned flat to ensure that the insertion site was at or below the level of the heart.    Hands were washed, clean gloves were applied and central line dressing was gently removed. Catheter exit site was not cultured.     A new pair of clean gloves were then applied. Insertion site was cleansed with 2% Chlorhexidine swab using a circular motion beginning at the insertion site and moving outward for 30 seconds and allowed to dry.     Clamp on line was not present    Patient performed Valsalva.     The central line was grasped at the insertion site and slowly pulled outward parallel to the skin. Resistance Not met.    After central line was completely removed, a sterile 4x4 gauze pad was used to apply light pressure until bleeding stopped. At that time, petroleum-based gauze and a sterile occlusive dressing was applied to exit site.     Patient was instructed to keep dressing in place for at least 24 hours and to remain flat.     Catheter was inspected after removal and Intact. Tip of central line was not sent for culture. Patient tolerated procedure.      Vitals were obtained pre and post removal on PICC line. VSS

## 2021-12-02 NOTE — PAYOR COMM NOTE
"Melanie Herrera RN   Phone [phone]  Fax [phone]    Karen Larkin (66 y.o. Female)             Date of Birth Social Security Number Address Home Phone MRN    1955  [facility] [address] [address] [phone] [id number]    T[employer] Marital Status             Faith        Admission Date Admission Type Admitting Provider Attending Provider Department, Room/Bed    21 Urgent Jayde Owens II, DO [place], Tracy M II, DO [address], [address]    Discharge Date Discharge Disposition Discharge Destination           Home or Self Care              Attending Provider: Jayde Owens II, DO    Allergies: No Known Allergies    Isolation: None   Infection: None   Code Status: CPR   Advance Care Planning Activity    Ht: 142.2 cm (55.98\")   Wt: 53.5 kg (117 lb 15.1 oz)    Admission Cmt: None   Principal Problem: Sepsis [A41.9]                 Active Insurance as of 2021     Primary Coverage     Payor Plan Insurance Group Employer/Plan Group    [id number] PPO [id number]     Payor Plan Address Payor Plan Phone Number Payor Plan Fax Number Effective Dates    [address] [phone]  2015 - None Entered    [facility] [id number]       Subscriber Name Subscriber Birth Date Member ID       KAREN LARKIN 1955 [id number]                 Emergency Contacts      (Rel.) Home Phone Work Phone Mobile Phone    Binh Larkin (Spouse) [phone] -- [phone]    TraeAnia briones (Daughter) -- -- [phone]               Discharge Summary      Jayde Owens II, DO at 21 0916              [facility] Medicine Services  DISCHARGE SUMMARY    Patient Name: Karen Larkin  : 1955  MRN: [id number]    Date of Admission: 2021 11:56 AM  Date of Discharge: 2021  Primary Care Physician: Margarita Mcgovern MD    Consults     Date and Time Order Name Status Description    " 11/26/2021  8:58 AM Inpatient Urology Consult Completed           Hospital Course     Presenting Problem:   Septic shock (HCC) [A41.9, R65.21]    Active Hospital Problems    Diagnosis  POA   • Severe malnutrition (CMS/HCC) [E43]  Yes   • Chronic pain on Narcotics [G89.29]  Yes   • Pelvic prolapse [N81.9]  Yes   • GERD (gastroesophageal reflux disease) [K21.9]  Yes   • Rheumatoid arthritis [M06.9]  Yes   • Immunosuppression due to chronic steroid use [D84.821, T38.0X5A, Z79.52]  Not Applicable   • Atrial fibrillation (not on OAC) [I48.91]  Yes   • Polycystic liver disease [Q44.6]  Not Applicable      Resolved Hospital Problems    Diagnosis Date Resolved POA   • **Sepsis [A41.9] 12/02/2021 Yes   • Hydronephrosis of left kidney [N13.30] 12/02/2021 Yes   • Lactic acidemia [E87.2] 12/02/2021 Yes   • Septic shock (HCC) [A41.9, R65.21] 11/21/2021 Yes          Hospital Course:  Karen Larkin is a 66 y.o. female with a history of RA on Plaquenil and Prednisone, Afib (not on OAC), polycystic liver disease, pelvic prolapse (s/p failed surgical repair ~1 month ago with repeat surgery planned for 11/24/21) and chronic pain on narcotics who presented to [facility] on 11/21/21 with confusion and fever. She had a CT of the abdomen/pelvis which showed mild left hydronephrosis and hydroureter with air in the urinary bladder wall tracking into the soft tissues. Urinalysis is consistent with a UTI. She is on broad spectrum antibiotics.      AMS  -Due to sepsis. Resolved w/ treatment of below.     Sepsis  Hydronephrosis on left  UTI  Bladder prolapse  -Upon arrival patient admitted to ICU and placed on IV abx and and gynecology and urology were consulted given her hydronephrosis and bladder prolapse. Dr. Ann will follow up with her in 2 weeks time to discuss repair.   -She completed course of Fosfomycin on 11/30.     Discharge Follow Up Recommendations for outpatient labs/diagnostics:   Dr. Ann in 2 weeks    Day of  Discharge     HPI: Up in bed eating. Doing well. Eating well. Looking forward to transfer.     Review of Systems  Gen- No fevers, chills  CV- No chest pain, palpitations  Resp- No cough, dyspnea  GI- No N/V/D, abd pain    Vital Signs:   Temp:  [97.5 °F (36.4 °C)-98 °F (36.7 °C)] 98 °F (36.7 °C)  Heart Rate:  [] 77  Resp:  [16-19] 16  BP: (108-120)/(74-82) 108/74     Physical Exam:  Constitutional: No acute distress, awake, alert, weak appearing  HENT: NCAT, mucous membranes moist  Respiratory: Clear to auscultation bilaterally, respiratory effort normal   Cardiovascular: RRR, no murmurs, rubs, or gallops  Gastrointestinal: Positive bowel sounds, soft, nontender, nondistended  Musculoskeletal: No bilateral ankle edema  Psychiatric: Appropriate affect, cooperative  Neurologic: Oriented x 3, strength symmetric in all extremities, Cranial Nerves grossly intact to confrontation, speech clear  Skin: No rashes    Pertinent  and/or Most Recent Results     LAB RESULTS:      Lab 11/28/21 0441   WBC 2.41*   HEMOGLOBIN 12.5   HEMATOCRIT 40.4   PLATELETS 231   MCV 95.5         Lab 12/02/21  0436 12/01/21  0425 11/30/21  0419 11/29/21  1227 11/29/21  0508 11/28/21  0441   SODIUM  --   --   --   --   --  137   POTASSIUM 4.0 4.7 3.6  --  3.7 4.1   CHLORIDE  --   --   --   --   --  104   CO2  --   --   --   --   --  23.0   ANION GAP  --   --   --   --   --  10.0   BUN  --   --   --   --   --  7*   CREATININE  --   --   --   --   --  0.40*   GLUCOSE  --   --   --   --   --  48*   CALCIUM  --   --   --   --   --  8.4*   MAGNESIUM 1.8 2.0 1.6  --  1.7 1.9   TSH  --   --   --  1.910  --   --                          Brief Urine Lab Results  (Last result in the past 365 days)      Color   Clarity   Blood   Leuk Est   Nitrite   Protein   CREAT   Urine HCG        11/21/21 1237 Yellow   Turbid   Large (3+)   Large (3+)   Positive   30 mg/dL (1+)               Microbiology Results (last 10 days)     ** No results found for the last  240 hours. **          Adult Transthoracic Echo Complete W/ Cont if Necessary Per Protocol    Result Date: 11/29/2021  · Left ventricular ejection fraction appears to be 61 - 65%. Left ventricular systolic function is normal. · Left ventricular wall thickness is consistent with mild concentric hypertrophy. · Left ventricular diastolic dysfunction is noted. · Left atrial volume is severely increased. · There is calcification of the aortic valve. · Estimated right ventricular systolic pressure from tricuspid regurgitation is normal (<35 mmHg). Calculated right ventricular systolic pressure from tricuspid regurgitation is 25 mmHg. · Moderate mitral valve regurgitation is present.      US Renal Limited    Result Date: 11/28/2021  EXAMINATION: US RENAL LIMITED-  INDICATION: assess resolution of hydroureter  TECHNIQUE: Transverse and sagittal grayscale sonographic evaluation of the bilateral kidneys and bladder supplemented with color Doppler.  COMPARISON: Outside CT abdomen and pelvis 11/21/2021  FINDINGS:  Right kidney:  The right kidney measures 11.8 x 5.7 x 6.5 cm in size and demonstrates normal morphology and echogenicity without evidence of solid cortical renal lesion; small right renal cysts noted on prior CT are not well visualized on this ultrasound exam. Normal renal hilar vascularity on color Doppler assessment. No evidence of hydronephrosis or nephrolithiasis.  Left kidney:  The left kidney measures 14.2 x 5.7 x 6.8 cm and demonstrates normal morphology and echogenicity without evidence of renal cortical atrophy. There is mild hydronephrosis. Numerous left renal cysts are seen measuring up to 2.9 cm in diameter. Normal renal vascularity on color Doppler assessment. No evidence of nephrolithiasis.  Bladder: The bladder is poorly visualized owing to shadowing gas. Outside CT demonstrates loss of intraluminal gas within the bladder. A Canales catheter is in place.       Persistent mild left hydronephrosis as seen  on prior outside CT abdomen and pelvis from 11/21/2021. Multiple left renal cysts. Multiple right renal cysts seen on outside CT are not well appreciated on today's exam. The bladder is poorly evaluated owing to extensive shadowing gas, possibly gas within the bladder lumen as seen on recent CT, and indwelling Canales catheter in place. Consider CT for further evaluation as warranted.   This report was finalized on 11/28/2021 8:28 AM by Tejas Shaver MD.      XR Chest 1 View    Result Date: 11/22/2021  EXAMINATION: XR CHEST 1 VW-  INDICATION: PICC placement.  COMPARISON: None.  FINDINGS: AP view of the chest reveal cardiac size borderline enlarged. Mild increase in central vascular congestion. No pneumothorax or pleural effusion. Degenerative changes of the spine. Right arm PICC terminates in the distal SVC.         Right arm PICC terminates within the distal SVC.  D:  11/22/2021 E:  11/22/2021  This report was finalized on 11/22/2021 3:04 PM by Dr. Donta Gonzalez.      XR Outside Films    Result Date: 11/21/2021  This procedure was auto-finalized with no dictation required.    CT Outside Films    Result Date: 11/21/2021  This procedure was auto-finalized with no dictation required.    CT Outside Films    Result Date: 11/21/2021  This procedure was auto-finalized with no dictation required.              Results for orders placed during the hospital encounter of 11/21/21    Adult Transthoracic Echo Complete W/ Cont if Necessary Per Protocol    Interpretation Summary  · Left ventricular ejection fraction appears to be 61 - 65%. Left ventricular systolic function is normal.  · Left ventricular wall thickness is consistent with mild concentric hypertrophy.  · Left ventricular diastolic dysfunction is noted.  · Left atrial volume is severely increased.  · There is calcification of the aortic valve.  · Estimated right ventricular systolic pressure from tricuspid regurgitation is normal (<35 mmHg). Calculated right  ventricular systolic pressure from tricuspid regurgitation is 25 mmHg.  · Moderate mitral valve regurgitation is present.      Plan for Follow-up of Pending Labs/Results:    Discharge Details        Discharge Medications      New Medications      Instructions Start Date   acetaminophen 325 MG tablet  Commonly known as: TYLENOL   650 mg, Oral, Every 6 Hours PRN      metoprolol tartrate 25 MG tablet  Commonly known as: LOPRESSOR   25 mg, Oral, Every 12 Hours Scheduled      saccharomyces boulardii 250 MG capsule  Commonly known as: FLORASTOR   250 mg, Oral, 2 Times Daily         Continue These Medications      Instructions Start Date   celecoxib 200 MG capsule  Commonly known as: CeleBREX   200 mg, Oral, Daily      ferrous sulfate 325 (65 FE) MG tablet   325 mg, Oral, 2 times daily      folic acid 1 MG tablet  Commonly known as: FOLVITE   1 mg, Daily      hydroxychloroquine 200 MG tablet  Commonly known as: PLAQUENIL   200 mg, Oral, Daily      omeprazole 20 MG capsule  Commonly known as: priLOSEC   20 mg, Oral, 2 times daily      predniSONE 1 MG tablet  Commonly known as: DELTASONE   5 mg, Oral, Daily         Stop These Medications    furosemide 20 MG tablet  Commonly known as: LASIX     HYDROcodone-acetaminophen 5-325 MG per tablet  Commonly known as: NORCO            No Known Allergies      Discharge Disposition:  Home or Self Care    Diet:  Hospital:  Diet Order   Procedures   • Diet Regular       Restrictions or Other Recommendations:         CODE STATUS:    Code Status and Medical Interventions:   Ordered at: 11/21/21 3545     Level Of Support Discussed With:    Patient     Code Status (Patient has no pulse and is not breathing):    CPR (Attempt to Resuscitate)     Medical Interventions (Patient has pulse or is breathing):    Full Support       No future appointments.    Additional Instructions for the Follow-ups that You Need to Schedule     Discharge Follow-up with Specialty: Dr. Ann; 2 Weeks   As  directed      Specialty: Dr. Ann    Follow Up: 2 Weeks                     Jayde Owens II, DO  12/02/21      Time Spent on Discharge:  I spent  33  minutes on this discharge activity which included: face-to-face encounter with the patient, reviewing the data in the system, coordination of the care with the nursing staff as well as consultants, documentation, and entering orders.          Electronically signed by Jayde Owens II, DO at 12/02/21 4713

## 2021-12-02 NOTE — NURSING NOTE
WOCN follow up for: coccyx    Assessment and Care provided: Coccyx wounds  Wounds at her coccyx and right gluteal continue to improve.  All wounds are blanching at this time.  The wound is still open and the wound bed is red/moist.  Cleansed bottom with barrier wipes and applied combination of Venelex and Z guard to her wounds and bottom.  Bilateral heels are pink intact and blanchable.      Continue with current plan of care. See Wound Care orders.  *Continue to maintain good skin care, keep dry, turn regularly, keep heels elevated and offloaded with heel boots.    *Apply z-guard to bottom and bony prominences daily and as needed with incontinence episodes.  *Follow C.A.R.E protocol if medical devices (Bipap, banda, Ng tube, etc) are being used.     All skin interventions are in place.  Discussed with RN.    WO Nurse will continue to follow.  Please contact with questions or if needs arise.        This note is dictated utilizing voice recognition software. Unfortunately, this leads to occasional typographical errors. I apologize in advance if the situation occurs. If questions occur please do not hesitate to contact me.

## 2021-12-02 NOTE — CASE MANAGEMENT/SOCIAL WORK
Case Management Discharge Note      Final Note: Per Katerine(liason for [facility]) pt has been approved by insurance for an acute rehab bed today and [facility] can accept her on med unit. Transportation arranged with [facility] to transport pt today at 3 pm. Pt will need to be in the lobby of the maternity entrance by 235 pm.  CM will fax discharge summary to [facility] Med unit at [phone]. Nurse can call report to [phone] and send a copy of the discharge summary with pt.  Pt is agreeable to discharge plan, but states she will need some clothes to wear during transport as her spouse does not get off work until 4 pm. CM asked Mita FREED to assist with providing clothing for transport. No other discharge needs identified.         Selected Continued Care - Admitted Since 11/21/2021     Destination Coordination complete.    Service Provider Selected Services Address Phone Fax Patient Preferred    [facility]  Inpatient Rehabilitation [address] [address] [phone] --          Durable Medical Equipment    No services have been selected for the patient.              Dialysis/Infusion    No services have been selected for the patient.              Home Medical Care    No services have been selected for the patient.              Therapy    No services have been selected for the patient.              Community Resources    No services have been selected for the patient.              Community & DME    No services have been selected for the patient.                  Transportation Services  Other: Other ([facility])    Final Discharge Disposition Code: 62 - inpatient rehab facility

## 2021-12-02 NOTE — CASE MANAGEMENT/SOCIAL WORK
Continued Stay Note  [facility]     Patient Name: Karen Larkin  MRN: [id number]  Today's Date: 12/2/2021    Admit Date: 11/21/2021     Discharge Plan     Row Name 12/02/21 1240       Plan    Plan SW    Plan Comments SW delivered clothing from the clothing closet to patient’s bedside.               Discharge Codes    No documentation.               Expected Discharge Date and Time     Expected Discharge Date Expected Discharge Time    Dec 2, 2021             EMELINA Zaragoza (Kay)

## 2021-12-11 ENCOUNTER — TRANSCRIBE ORDERS (OUTPATIENT)
Dept: HOME HEALTH SERVICES | Facility: HOME HEALTHCARE | Age: 66
End: 2021-12-11

## 2021-12-11 ENCOUNTER — HOME HEALTH ADMISSION (OUTPATIENT)
Dept: HOME HEALTH SERVICES | Facility: HOME HEALTHCARE | Age: 66
End: 2021-12-11

## 2021-12-11 DIAGNOSIS — A41.9 SYSTEMIC INFECTION (HCC): Primary | ICD-10-CM

## 2022-02-22 ENCOUNTER — HOSPITAL ENCOUNTER (EMERGENCY)
Facility: HOSPITAL | Age: 67
Discharge: NURSING, REHAB OR LONG-TERM CARE | End: 2022-02-22
Attending: EMERGENCY MEDICINE | Admitting: EMERGENCY MEDICINE

## 2022-02-22 ENCOUNTER — APPOINTMENT (OUTPATIENT)
Dept: GENERAL RADIOLOGY | Facility: HOSPITAL | Age: 67
End: 2022-02-22

## 2022-02-22 VITALS
SYSTOLIC BLOOD PRESSURE: 100 MMHG | HEIGHT: 56 IN | OXYGEN SATURATION: 97 % | RESPIRATION RATE: 18 BRPM | BODY MASS INDEX: 21.82 KG/M2 | HEART RATE: 67 BPM | WEIGHT: 97 LBS | TEMPERATURE: 97.5 F | DIASTOLIC BLOOD PRESSURE: 68 MMHG

## 2022-02-22 DIAGNOSIS — R07.9 CHEST PAIN, UNSPECIFIED TYPE: Primary | ICD-10-CM

## 2022-02-22 DIAGNOSIS — R10.10 UPPER ABDOMINAL PAIN: ICD-10-CM

## 2022-02-22 DIAGNOSIS — I48.91 ATRIAL FIBRILLATION, UNSPECIFIED TYPE: ICD-10-CM

## 2022-02-22 LAB
ALBUMIN SERPL-MCNC: 3.5 G/DL (ref 3.5–5.2)
ALBUMIN/GLOB SERPL: 1.1 G/DL
ALP SERPL-CCNC: 278 U/L (ref 39–117)
ALT SERPL W P-5'-P-CCNC: 12 U/L (ref 1–33)
ANION GAP SERPL CALCULATED.3IONS-SCNC: 16 MMOL/L (ref 5–15)
AST SERPL-CCNC: 35 U/L (ref 1–32)
BASOPHILS # BLD AUTO: 0.07 10*3/MM3 (ref 0–0.2)
BASOPHILS NFR BLD AUTO: 1.2 % (ref 0–1.5)
BILIRUB SERPL-MCNC: 0.7 MG/DL (ref 0–1.2)
BUN SERPL-MCNC: 14 MG/DL (ref 8–23)
BUN/CREAT SERPL: 24.6 (ref 7–25)
CALCIUM SPEC-SCNC: 9.3 MG/DL (ref 8.6–10.5)
CHLORIDE SERPL-SCNC: 99 MMOL/L (ref 98–107)
CO2 SERPL-SCNC: 23 MMOL/L (ref 22–29)
CREAT SERPL-MCNC: 0.57 MG/DL (ref 0.57–1)
DEPRECATED RDW RBC AUTO: 48.4 FL (ref 37–54)
EOSINOPHIL # BLD AUTO: 0.08 10*3/MM3 (ref 0–0.4)
EOSINOPHIL NFR BLD AUTO: 1.3 % (ref 0.3–6.2)
ERYTHROCYTE [DISTWIDTH] IN BLOOD BY AUTOMATED COUNT: 14.4 % (ref 12.3–15.4)
GFR SERPL CREATININE-BSD FRML MDRD: 107 ML/MIN/1.73
GLOBULIN UR ELPH-MCNC: 3.3 GM/DL
GLUCOSE SERPL-MCNC: 102 MG/DL (ref 65–99)
HCT VFR BLD AUTO: 43.5 % (ref 34–46.6)
HGB BLD-MCNC: 14.2 G/DL (ref 12–15.9)
HOLD SPECIMEN: NORMAL
IMM GRANULOCYTES # BLD AUTO: 0.1 10*3/MM3 (ref 0–0.05)
IMM GRANULOCYTES NFR BLD AUTO: 1.7 % (ref 0–0.5)
LIPASE SERPL-CCNC: 26 U/L (ref 13–60)
LYMPHOCYTES # BLD AUTO: 0.56 10*3/MM3 (ref 0.7–3.1)
LYMPHOCYTES NFR BLD AUTO: 9.3 % (ref 19.6–45.3)
MCH RBC QN AUTO: 30.3 PG (ref 26.6–33)
MCHC RBC AUTO-ENTMCNC: 32.6 G/DL (ref 31.5–35.7)
MCV RBC AUTO: 92.8 FL (ref 79–97)
MONOCYTES # BLD AUTO: 0.58 10*3/MM3 (ref 0.1–0.9)
MONOCYTES NFR BLD AUTO: 9.6 % (ref 5–12)
NEUTROPHILS NFR BLD AUTO: 4.65 10*3/MM3 (ref 1.7–7)
NEUTROPHILS NFR BLD AUTO: 76.9 % (ref 42.7–76)
NRBC BLD AUTO-RTO: 0 /100 WBC (ref 0–0.2)
NT-PROBNP SERPL-MCNC: 1637 PG/ML (ref 0–900)
PLATELET # BLD AUTO: 375 10*3/MM3 (ref 140–450)
PMV BLD AUTO: 10.1 FL (ref 6–12)
POTASSIUM SERPL-SCNC: 3.5 MMOL/L (ref 3.5–5.2)
PROT SERPL-MCNC: 6.8 G/DL (ref 6–8.5)
RBC # BLD AUTO: 4.69 10*6/MM3 (ref 3.77–5.28)
SODIUM SERPL-SCNC: 138 MMOL/L (ref 136–145)
TROPONIN T SERPL-MCNC: <0.01 NG/ML (ref 0–0.03)
TROPONIN T SERPL-MCNC: <0.01 NG/ML (ref 0–0.03)
WBC NRBC COR # BLD: 6.04 10*3/MM3 (ref 3.4–10.8)
WHOLE BLOOD HOLD SPECIMEN: NORMAL

## 2022-02-22 PROCEDURE — 83880 ASSAY OF NATRIURETIC PEPTIDE: CPT | Performed by: EMERGENCY MEDICINE

## 2022-02-22 PROCEDURE — 71045 X-RAY EXAM CHEST 1 VIEW: CPT

## 2022-02-22 PROCEDURE — 93005 ELECTROCARDIOGRAM TRACING: CPT | Performed by: EMERGENCY MEDICINE

## 2022-02-22 PROCEDURE — 85025 COMPLETE CBC W/AUTO DIFF WBC: CPT | Performed by: EMERGENCY MEDICINE

## 2022-02-22 PROCEDURE — 84484 ASSAY OF TROPONIN QUANT: CPT | Performed by: EMERGENCY MEDICINE

## 2022-02-22 PROCEDURE — 99284 EMERGENCY DEPT VISIT MOD MDM: CPT

## 2022-02-22 PROCEDURE — 83690 ASSAY OF LIPASE: CPT | Performed by: EMERGENCY MEDICINE

## 2022-02-22 PROCEDURE — 36415 COLL VENOUS BLD VENIPUNCTURE: CPT

## 2022-02-22 PROCEDURE — 80053 COMPREHEN METABOLIC PANEL: CPT | Performed by: EMERGENCY MEDICINE

## 2022-02-22 RX ORDER — ASPIRIN 81 MG/1
324 TABLET, CHEWABLE ORAL ONCE
Status: DISCONTINUED | OUTPATIENT
Start: 2022-02-22 | End: 2022-02-22 | Stop reason: HOSPADM

## 2022-02-22 RX ORDER — SODIUM CHLORIDE 0.9 % (FLUSH) 0.9 %
10 SYRINGE (ML) INJECTION AS NEEDED
Status: DISCONTINUED | OUTPATIENT
Start: 2022-02-22 | End: 2022-02-22 | Stop reason: HOSPADM

## 2022-02-22 RX ORDER — HYDROCODONE BITARTRATE AND ACETAMINOPHEN 7.5; 325 MG/1; MG/1
1 TABLET ORAL ONCE
Status: COMPLETED | OUTPATIENT
Start: 2022-02-22 | End: 2022-02-22

## 2022-02-22 RX ADMIN — HYDROCODONE BITARTRATE AND ACETAMINOPHEN 1 TABLET: 7.5; 325 TABLET ORAL at 18:37

## 2022-02-22 NOTE — ED NOTES
REQUESTED RECORDS FROM [facility] PER DR CHAVO Pisano, Tayla L  02/22/22 1156

## 2022-02-22 NOTE — ED PROVIDER NOTES
Subjective   66-year-old female who was sent from  [facility] for evaluation of abdominal/chest pain.  Per report from the patient she was just discharged from [facility] after a long hospital stay and a recent abdominal surgical intervention ureteral vesicular prolapse earlier today.  After being at [facility] for no more than 2 hours the patient was transferred to [facility] where she did not have recent surgical intervention for evaluation of reported chest pain.  Reportedly she was given 3 sublingual nitroglycerin at [facility] for said chest pain, which led to hypotension.  With observation and time that blood pressure has improved to approximately 98 systolic at this time.  From record review she had a blood pressure of 105 systolic approximately 2 months ago.  Upon arrival here she actually points to the upper abdomen as the source of her pain.  She has moderate tenderness to palpation in the epigastric region of the abdomen.  No peritoneal signs, abdomen is soft, normal bowel sounds. She denies fever, body aches, or chills.  No acute cough or shortness of breath.  She does have a history of underlying lung disease but does not wear oxygen at baseline.  The patient has a history of rheumatoid arthritis and reported takes Plaquenil and prednisone on a regular basis.  Patient also has a history of A. fib but does not take anticoagulation.  The patient was transferred from [facility] to our facility in November of last year, 3 months ago.  At that time she was felt to have pyelonephritis with infection in the kidney which required IV antibiotics.  There is labile with urology service is following the patient and reportedly performed the surgical intervention that was done over at [facility] within the last month.          Review of Systems   Constitutional: Positive for fatigue. Negative for chills and fever.   HENT: Negative for congestion, ear pain,  postnasal drip, sinus pressure and sore throat.    Eyes: Negative for pain, redness and visual disturbance.   Respiratory: Negative for cough, chest tightness and shortness of breath.    Cardiovascular: Positive for chest pain. Negative for palpitations and leg swelling.   Gastrointestinal: Positive for abdominal pain. Negative for anal bleeding, blood in stool, diarrhea, nausea and vomiting.   Endocrine: Negative for polydipsia and polyuria.   Genitourinary: Negative for difficulty urinating, dysuria, frequency and urgency.   Musculoskeletal: Negative for arthralgias, back pain and neck pain.   Skin: Negative for pallor and rash.   Allergic/Immunologic: Negative for environmental allergies and immunocompromised state.   Neurological: Negative for dizziness, weakness and headaches.   Hematological: Negative for adenopathy.   Psychiatric/Behavioral: Negative for confusion, self-injury and suicidal ideas. The patient is not nervous/anxious.    All other systems reviewed and are negative.      Past Medical History:   Diagnosis Date   • Anemia    • Arthritis     Rheumatoid   • GERD (gastroesophageal reflux disease)    • Hiatal hernia    • Liver disease    • Murmur    • Rheumatoid arthritis (HCC)    • Varicose veins of both lower extremities        No Known Allergies    Past Surgical History:   Procedure Laterality Date   • HERNIA REPAIR     • HIP SURGERY Right    • SHOULDER SURGERY Left        Family History   Problem Relation Age of Onset   • Heart disease Mother    • Cancer Mother         Colon   • Stroke Mother    • Cancer Father         Esophageal   • Stroke Father    • Cancer Sister         Colon       Social History     Socioeconomic History   • Marital status:    Tobacco Use   • Smoking status: Never Smoker           Objective   Physical Exam  Vitals and nursing note reviewed.   Constitutional:       General: She is not in acute distress.     Appearance: Normal appearance. She is well-developed. She is not  toxic-appearing or diaphoretic.   HENT:      Head: Normocephalic and atraumatic.      Right Ear: External ear normal.      Left Ear: External ear normal.      Nose: Nose normal.   Eyes:      General: Lids are normal.      Pupils: Pupils are equal, round, and reactive to light.   Neck:      Trachea: No tracheal deviation.   Cardiovascular:      Rate and Rhythm: Normal rate and regular rhythm.      Pulses: No decreased pulses.      Heart sounds: Normal heart sounds. No murmur heard.  No friction rub. No gallop.    Pulmonary:      Effort: Pulmonary effort is normal. No respiratory distress.      Breath sounds: Examination of the right-lower field reveals rales. Examination of the left-lower field reveals rales. Rales present. No decreased breath sounds, wheezing or rhonchi.   Abdominal:      General: Bowel sounds are normal.      Palpations: Abdomen is soft.      Tenderness: There is generalized abdominal tenderness. There is no guarding or rebound.   Musculoskeletal:         General: No deformity. Normal range of motion.      Cervical back: Normal range of motion and neck supple.   Lymphadenopathy:      Cervical: No cervical adenopathy.   Skin:     General: Skin is warm and dry.      Findings: No rash.   Neurological:      Mental Status: She is alert and oriented to person, place, and time.      Cranial Nerves: No cranial nerve deficit.      Sensory: No sensory deficit.   Psychiatric:         Speech: Speech normal.         Behavior: Behavior normal.         Thought Content: Thought content normal.         Judgment: Judgment normal.         Procedures           ED Course                                   [facility] reviewed by Savannah Louise MD             MDM  Number of Diagnoses or Management Options  Atrial fibrillation, unspecified type (HCC): new and requires workup  Chest pain, unspecified type: new and requires workup  Upper abdominal pain: new and requires workup  Diagnosis management comments: HEART Score  for Major Cardiac Events - MDCalc  Calculated on Feb 22 2022 7:27 PM  3 points -> Low Score (0-3 points) Risk of MACE of 0.9-1.7%.    Patient presents for evaluation of chest pain.  Chest pain has resolved upon arrival to the ER.    The patient also has some abdominal pain that is appropriate and expected after recent surgical intervention.  She states that this is unchanged.    She has no preceding history of coronary artery disease.  Denies high blood pressure, high cholesterol, or diabetes.  She denies ever smoking.    Serial cardiac enzymes and EKGs do not show any ischemic changes.  EKG shows A. fib with nonspecific changes unchanged relative to previous comparison EKG.    The patient will be referred to cardiology for outpatient evaluation due to A. fib and chest pain.    Advised return to the ER with any further concern.    Serial abdominal exams showed the abdomen is soft, with no significant tenderness or peritoneal signs.       Amount and/or Complexity of Data Reviewed  Clinical lab tests: ordered and reviewed  Tests in the radiology section of CPT®: ordered and reviewed  Decide to obtain previous medical records or to obtain history from someone other than the patient: yes  Review and summarize past medical records: yes  Independent visualization of images, tracings, or specimens: yes        Final diagnoses:   Chest pain, unspecified type   Upper abdominal pain   Atrial fibrillation, unspecified type (HCC)       ED Disposition  ED Disposition     ED Disposition Condition Comment    Discharge Stable           Panchito Ann Jr., MD  [address]  [address]  [place] [address]  [phone]    Schedule an appointment as soon as possible for a visit       [facility] CARDIOLOGY  [address]  [address]  [place] [id number] [phone]             Medication List      No changes were made to your prescriptions during this visit.          Savannah Louise,  MD  02/22/22 0996

## 2022-02-23 NOTE — DISCHARGE INSTRUCTIONS
Follow-up with cardiology for further outpatient valuation.    Continue outpatient rehab as scheduled.    Follow-up with Dr. Ann as needed in regards to postoperative evaluation.

## 2022-02-23 NOTE — ED NOTES
Attempt to call report to [facility].  Selected six options, no answer on both options  No .  Charge RN made aware.    [phone]     Tequila Dinero, RN  02/22/22 2019

## 2022-02-23 NOTE — ED NOTES
Pt discharged,returning to [facility]  Pt does not have transport.  Call placed to [facility] re: transport    [facility] stating unable to transport pt at this time.     Tequila Dinero RN  02/22/22 1934

## 2022-02-24 ENCOUNTER — ANESTHESIA (OUTPATIENT)
Dept: GASTROENTEROLOGY | Facility: HOSPITAL | Age: 67
End: 2022-02-24

## 2022-02-24 ENCOUNTER — ANESTHESIA EVENT (OUTPATIENT)
Dept: GASTROENTEROLOGY | Facility: HOSPITAL | Age: 67
End: 2022-02-24

## 2022-02-24 ENCOUNTER — HOSPITAL ENCOUNTER (INPATIENT)
Facility: HOSPITAL | Age: 67
LOS: 4 days | Discharge: NURSING, REHAB OR LONG-TERM CARE | End: 2022-03-04
Attending: EMERGENCY MEDICINE | Admitting: INTERNAL MEDICINE

## 2022-02-24 ENCOUNTER — APPOINTMENT (OUTPATIENT)
Dept: CT IMAGING | Facility: HOSPITAL | Age: 67
End: 2022-02-24

## 2022-02-24 DIAGNOSIS — Z79.01 ANTICOAGULATED: ICD-10-CM

## 2022-02-24 DIAGNOSIS — R10.10 ACUTE UPPER ABDOMINAL PAIN: ICD-10-CM

## 2022-02-24 DIAGNOSIS — K92.2 ACUTE UPPER GI BLEED: Primary | ICD-10-CM

## 2022-02-24 DIAGNOSIS — K21.9 GASTROESOPHAGEAL REFLUX DISEASE CONCURRENT WITH AND DUE TO PARAESOPHAGEAL HERNIA: ICD-10-CM

## 2022-02-24 DIAGNOSIS — K44.9 GASTROESOPHAGEAL REFLUX DISEASE CONCURRENT WITH AND DUE TO PARAESOPHAGEAL HERNIA: ICD-10-CM

## 2022-02-24 DIAGNOSIS — K92.0 HEMATEMESIS WITH NAUSEA: ICD-10-CM

## 2022-02-24 DIAGNOSIS — G89.29 OTHER CHRONIC PAIN: Chronic | ICD-10-CM

## 2022-02-24 PROBLEM — I48.0 PAF (PAROXYSMAL ATRIAL FIBRILLATION) (HCC): Status: ACTIVE | Noted: 2022-02-24

## 2022-02-24 PROBLEM — R07.89 CHEST PAIN, ATYPICAL: Status: ACTIVE | Noted: 2022-02-24

## 2022-02-24 PROBLEM — I10 ESSENTIAL HYPERTENSION: Status: ACTIVE | Noted: 2022-02-24

## 2022-02-24 LAB
ABO GROUP BLD: NORMAL
ABO GROUP BLD: NORMAL
ALBUMIN SERPL-MCNC: 3.6 G/DL (ref 3.5–5.2)
ALBUMIN/GLOB SERPL: 1.1 G/DL
ALP SERPL-CCNC: 238 U/L (ref 39–117)
ALT SERPL W P-5'-P-CCNC: 16 U/L (ref 1–33)
ANION GAP SERPL CALCULATED.3IONS-SCNC: 17 MMOL/L (ref 5–15)
AST SERPL-CCNC: 43 U/L (ref 1–32)
BACTERIA UR QL AUTO: ABNORMAL /HPF
BASOPHILS # BLD AUTO: 0.05 10*3/MM3 (ref 0–0.2)
BASOPHILS NFR BLD AUTO: 1.3 % (ref 0–1.5)
BILIRUB SERPL-MCNC: 0.7 MG/DL (ref 0–1.2)
BILIRUB UR QL STRIP: NEGATIVE
BLD GP AB SCN SERPL QL: NEGATIVE
BUN SERPL-MCNC: 23 MG/DL (ref 8–23)
BUN/CREAT SERPL: 35.9 (ref 7–25)
CALCIUM SPEC-SCNC: 9.3 MG/DL (ref 8.6–10.5)
CHLORIDE SERPL-SCNC: 100 MMOL/L (ref 98–107)
CLARITY UR: ABNORMAL
CO2 SERPL-SCNC: 23 MMOL/L (ref 22–29)
COLOR UR: YELLOW
CREAT SERPL-MCNC: 0.64 MG/DL (ref 0.57–1)
D-LACTATE SERPL-SCNC: 1.9 MMOL/L (ref 0.5–2)
DEPRECATED RDW RBC AUTO: 47.4 FL (ref 37–54)
DIGOXIN SERPL-MCNC: 0.74 NG/ML (ref 0.6–1.2)
EOSINOPHIL # BLD AUTO: 0.03 10*3/MM3 (ref 0–0.4)
EOSINOPHIL NFR BLD AUTO: 0.8 % (ref 0.3–6.2)
ERYTHROCYTE [DISTWIDTH] IN BLOOD BY AUTOMATED COUNT: 14.1 % (ref 12.3–15.4)
FLUAV RNA RESP QL NAA+PROBE: NOT DETECTED
FLUBV RNA RESP QL NAA+PROBE: NOT DETECTED
GFR SERPL CREATININE-BSD FRML MDRD: 93 ML/MIN/1.73
GLOBULIN UR ELPH-MCNC: 3.2 GM/DL
GLUCOSE SERPL-MCNC: 88 MG/DL (ref 65–99)
GLUCOSE UR STRIP-MCNC: NEGATIVE MG/DL
HCT VFR BLD AUTO: 38.7 % (ref 34–46.6)
HCT VFR BLD AUTO: 42.1 % (ref 34–46.6)
HCT VFR BLD AUTO: 42.7 % (ref 34–46.6)
HGB BLD-MCNC: 12.6 G/DL (ref 12–15.9)
HGB BLD-MCNC: 13.6 G/DL (ref 12–15.9)
HGB BLD-MCNC: 13.8 G/DL (ref 12–15.9)
HGB UR QL STRIP.AUTO: ABNORMAL
HOLD SPECIMEN: NORMAL
HYALINE CASTS UR QL AUTO: ABNORMAL /LPF
IMM GRANULOCYTES # BLD AUTO: 0.06 10*3/MM3 (ref 0–0.05)
IMM GRANULOCYTES NFR BLD AUTO: 1.5 % (ref 0–0.5)
INR PPP: 1.65 (ref 0.85–1.16)
KETONES UR QL STRIP: ABNORMAL
LEUKOCYTE ESTERASE UR QL STRIP.AUTO: ABNORMAL
LIPASE SERPL-CCNC: 25 U/L (ref 13–60)
LYMPHOCYTES # BLD AUTO: 0.46 10*3/MM3 (ref 0.7–3.1)
LYMPHOCYTES NFR BLD AUTO: 11.7 % (ref 19.6–45.3)
MCH RBC QN AUTO: 30.1 PG (ref 26.6–33)
MCHC RBC AUTO-ENTMCNC: 32.8 G/DL (ref 31.5–35.7)
MCV RBC AUTO: 91.9 FL (ref 79–97)
MONOCYTES # BLD AUTO: 0.41 10*3/MM3 (ref 0.1–0.9)
MONOCYTES NFR BLD AUTO: 10.5 % (ref 5–12)
NEUTROPHILS NFR BLD AUTO: 2.91 10*3/MM3 (ref 1.7–7)
NEUTROPHILS NFR BLD AUTO: 74.2 % (ref 42.7–76)
NITRITE UR QL STRIP: NEGATIVE
NRBC BLD AUTO-RTO: 0 /100 WBC (ref 0–0.2)
NT-PROBNP SERPL-MCNC: 2163 PG/ML (ref 0–900)
PH UR STRIP.AUTO: 5.5 [PH] (ref 5–8)
PLATELET # BLD AUTO: 368 10*3/MM3 (ref 140–450)
PMV BLD AUTO: 10.2 FL (ref 6–12)
POTASSIUM SERPL-SCNC: 3.5 MMOL/L (ref 3.5–5.2)
POTASSIUM SERPL-SCNC: 4 MMOL/L (ref 3.5–5.2)
PROCALCITONIN SERPL-MCNC: 0.06 NG/ML (ref 0–0.25)
PROT SERPL-MCNC: 6.8 G/DL (ref 6–8.5)
PROT UR QL STRIP: ABNORMAL
PROTHROMBIN TIME: 18.9 SECONDS (ref 11.4–14.4)
RBC # BLD AUTO: 4.58 10*6/MM3 (ref 3.77–5.28)
RBC # UR STRIP: ABNORMAL /HPF
REF LAB TEST METHOD: ABNORMAL
RH BLD: POSITIVE
RH BLD: POSITIVE
RSV RNA NPH QL NAA+NON-PROBE: NOT DETECTED
SARS-COV-2 RNA RESP QL NAA+PROBE: NOT DETECTED
SODIUM SERPL-SCNC: 140 MMOL/L (ref 136–145)
SP GR UR STRIP: 1.06 (ref 1–1.03)
SQUAMOUS #/AREA URNS HPF: ABNORMAL /HPF
T&S EXPIRATION DATE: NORMAL
TROPONIN T SERPL-MCNC: 0.01 NG/ML (ref 0–0.03)
TROPONIN T SERPL-MCNC: 0.01 NG/ML (ref 0–0.03)
UROBILINOGEN UR QL STRIP: ABNORMAL
WBC # UR STRIP: ABNORMAL /HPF
WBC NRBC COR # BLD: 3.92 10*3/MM3 (ref 3.4–10.8)
WHOLE BLOOD HOLD SPECIMEN: NORMAL
WHOLE BLOOD HOLD SPECIMEN: NORMAL
YEAST URNS QL MICRO: ABNORMAL /HPF

## 2022-02-24 PROCEDURE — 85018 HEMOGLOBIN: CPT | Performed by: INTERNAL MEDICINE

## 2022-02-24 PROCEDURE — 85025 COMPLETE CBC W/AUTO DIFF WBC: CPT | Performed by: EMERGENCY MEDICINE

## 2022-02-24 PROCEDURE — 93010 ELECTROCARDIOGRAM REPORT: CPT | Performed by: INTERNAL MEDICINE

## 2022-02-24 PROCEDURE — C9803 HOPD COVID-19 SPEC COLLECT: HCPCS

## 2022-02-24 PROCEDURE — 85610 PROTHROMBIN TIME: CPT | Performed by: EMERGENCY MEDICINE

## 2022-02-24 PROCEDURE — 86850 RBC ANTIBODY SCREEN: CPT | Performed by: EMERGENCY MEDICINE

## 2022-02-24 PROCEDURE — 84145 PROCALCITONIN (PCT): CPT | Performed by: NURSE PRACTITIONER

## 2022-02-24 PROCEDURE — 25010000002 METOCLOPRAMIDE PER 10 MG: Performed by: EMERGENCY MEDICINE

## 2022-02-24 PROCEDURE — 43239 EGD BIOPSY SINGLE/MULTIPLE: CPT | Performed by: INTERNAL MEDICINE

## 2022-02-24 PROCEDURE — G0378 HOSPITAL OBSERVATION PER HR: HCPCS

## 2022-02-24 PROCEDURE — 84484 ASSAY OF TROPONIN QUANT: CPT | Performed by: PHYSICIAN ASSISTANT

## 2022-02-24 PROCEDURE — 83605 ASSAY OF LACTIC ACID: CPT | Performed by: NURSE PRACTITIONER

## 2022-02-24 PROCEDURE — 99205 OFFICE O/P NEW HI 60 MIN: CPT | Performed by: NURSE PRACTITIONER

## 2022-02-24 PROCEDURE — 0 LIDOCAINE 1 % SOLUTION: Performed by: NURSE ANESTHETIST, CERTIFIED REGISTERED

## 2022-02-24 PROCEDURE — 71275 CT ANGIOGRAPHY CHEST: CPT

## 2022-02-24 PROCEDURE — 93005 ELECTROCARDIOGRAM TRACING: CPT | Performed by: PHYSICIAN ASSISTANT

## 2022-02-24 PROCEDURE — 25010000002 MORPHINE PER 10 MG: Performed by: EMERGENCY MEDICINE

## 2022-02-24 PROCEDURE — 74177 CT ABD & PELVIS W/CONTRAST: CPT

## 2022-02-24 PROCEDURE — 86900 BLOOD TYPING SEROLOGIC ABO: CPT

## 2022-02-24 PROCEDURE — 25010000002 CEFTRIAXONE PER 250 MG: Performed by: EMERGENCY MEDICINE

## 2022-02-24 PROCEDURE — 86901 BLOOD TYPING SEROLOGIC RH(D): CPT | Performed by: EMERGENCY MEDICINE

## 2022-02-24 PROCEDURE — 84132 ASSAY OF SERUM POTASSIUM: CPT | Performed by: ANESTHESIOLOGY

## 2022-02-24 PROCEDURE — 83880 ASSAY OF NATRIURETIC PEPTIDE: CPT | Performed by: NURSE PRACTITIONER

## 2022-02-24 PROCEDURE — 86900 BLOOD TYPING SEROLOGIC ABO: CPT | Performed by: EMERGENCY MEDICINE

## 2022-02-24 PROCEDURE — 80053 COMPREHEN METABOLIC PANEL: CPT | Performed by: EMERGENCY MEDICINE

## 2022-02-24 PROCEDURE — 93005 ELECTROCARDIOGRAM TRACING: CPT | Performed by: EMERGENCY MEDICINE

## 2022-02-24 PROCEDURE — 25010000002 IOPAMIDOL 61 % SOLUTION: Performed by: EMERGENCY MEDICINE

## 2022-02-24 PROCEDURE — 99284 EMERGENCY DEPT VISIT MOD MDM: CPT

## 2022-02-24 PROCEDURE — P9612 CATHETERIZE FOR URINE SPEC: HCPCS

## 2022-02-24 PROCEDURE — 85014 HEMATOCRIT: CPT | Performed by: INTERNAL MEDICINE

## 2022-02-24 PROCEDURE — 81001 URINALYSIS AUTO W/SCOPE: CPT | Performed by: NURSE PRACTITIONER

## 2022-02-24 PROCEDURE — 25010000002 ONDANSETRON PER 1 MG: Performed by: PHYSICIAN ASSISTANT

## 2022-02-24 PROCEDURE — 25010000002 PROPOFOL 10 MG/ML EMULSION: Performed by: NURSE ANESTHETIST, CERTIFIED REGISTERED

## 2022-02-24 PROCEDURE — 86901 BLOOD TYPING SEROLOGIC RH(D): CPT

## 2022-02-24 PROCEDURE — 93005 ELECTROCARDIOGRAM TRACING: CPT | Performed by: ANESTHESIOLOGY

## 2022-02-24 PROCEDURE — 87637 SARSCOV2&INF A&B&RSV AMP PRB: CPT | Performed by: INTERNAL MEDICINE

## 2022-02-24 PROCEDURE — 83690 ASSAY OF LIPASE: CPT | Performed by: EMERGENCY MEDICINE

## 2022-02-24 PROCEDURE — 51798 US URINE CAPACITY MEASURE: CPT

## 2022-02-24 PROCEDURE — 25010000002 OCTREOTIDE PER 25 MCG: Performed by: EMERGENCY MEDICINE

## 2022-02-24 PROCEDURE — 80162 ASSAY OF DIGOXIN TOTAL: CPT | Performed by: EMERGENCY MEDICINE

## 2022-02-24 PROCEDURE — 99223 1ST HOSP IP/OBS HIGH 75: CPT | Performed by: INTERNAL MEDICINE

## 2022-02-24 PROCEDURE — 87086 URINE CULTURE/COLONY COUNT: CPT | Performed by: NURSE PRACTITIONER

## 2022-02-24 RX ORDER — SACCHAROMYCES BOULARDII 250 MG
250 CAPSULE ORAL 2 TIMES DAILY
Status: DISCONTINUED | OUTPATIENT
Start: 2022-02-24 | End: 2022-03-04 | Stop reason: HOSPADM

## 2022-02-24 RX ORDER — FERROUS SULFATE 325(65) MG
325 TABLET ORAL 2 TIMES DAILY WITH MEALS
Status: DISCONTINUED | OUTPATIENT
Start: 2022-02-24 | End: 2022-03-04 | Stop reason: HOSPADM

## 2022-02-24 RX ORDER — SODIUM CHLORIDE 0.9 % (FLUSH) 0.9 %
10 SYRINGE (ML) INJECTION AS NEEDED
Status: DISCONTINUED | OUTPATIENT
Start: 2022-02-24 | End: 2022-02-24 | Stop reason: HOSPADM

## 2022-02-24 RX ORDER — SODIUM CHLORIDE 0.9 % (FLUSH) 0.9 %
10 SYRINGE (ML) INJECTION EVERY 12 HOURS SCHEDULED
Status: DISCONTINUED | OUTPATIENT
Start: 2022-02-24 | End: 2022-03-04 | Stop reason: HOSPADM

## 2022-02-24 RX ORDER — PANTOPRAZOLE SODIUM 40 MG/10ML
40 INJECTION, POWDER, LYOPHILIZED, FOR SOLUTION INTRAVENOUS
Status: DISCONTINUED | OUTPATIENT
Start: 2022-02-24 | End: 2022-02-24

## 2022-02-24 RX ORDER — ONDANSETRON 2 MG/ML
4 INJECTION INTRAMUSCULAR; INTRAVENOUS ONCE AS NEEDED
Status: DISCONTINUED | OUTPATIENT
Start: 2022-02-24 | End: 2022-02-24 | Stop reason: HOSPADM

## 2022-02-24 RX ORDER — PANTOPRAZOLE SODIUM 40 MG/10ML
80 INJECTION, POWDER, LYOPHILIZED, FOR SOLUTION INTRAVENOUS ONCE
Status: COMPLETED | OUTPATIENT
Start: 2022-02-24 | End: 2022-02-24

## 2022-02-24 RX ORDER — SODIUM CHLORIDE 0.9 % (FLUSH) 0.9 %
10 SYRINGE (ML) INJECTION AS NEEDED
Status: DISCONTINUED | OUTPATIENT
Start: 2022-02-24 | End: 2022-03-04 | Stop reason: HOSPADM

## 2022-02-24 RX ORDER — ONDANSETRON 4 MG/1
4 TABLET, FILM COATED ORAL EVERY 6 HOURS PRN
Status: DISCONTINUED | OUTPATIENT
Start: 2022-02-24 | End: 2022-03-04 | Stop reason: HOSPADM

## 2022-02-24 RX ORDER — SODIUM CHLORIDE 0.9 % (FLUSH) 0.9 %
10 SYRINGE (ML) INJECTION EVERY 12 HOURS SCHEDULED
Status: DISCONTINUED | OUTPATIENT
Start: 2022-02-24 | End: 2022-02-24 | Stop reason: HOSPADM

## 2022-02-24 RX ORDER — SODIUM CHLORIDE, SODIUM LACTATE, POTASSIUM CHLORIDE, CALCIUM CHLORIDE 600; 310; 30; 20 MG/100ML; MG/100ML; MG/100ML; MG/100ML
9 INJECTION, SOLUTION INTRAVENOUS CONTINUOUS
Status: DISCONTINUED | OUTPATIENT
Start: 2022-02-24 | End: 2022-02-25

## 2022-02-24 RX ORDER — ACETAMINOPHEN 325 MG/1
650 TABLET ORAL EVERY 4 HOURS PRN
Status: DISCONTINUED | OUTPATIENT
Start: 2022-02-24 | End: 2022-03-04 | Stop reason: HOSPADM

## 2022-02-24 RX ORDER — IPRATROPIUM BROMIDE AND ALBUTEROL SULFATE 2.5; .5 MG/3ML; MG/3ML
3 SOLUTION RESPIRATORY (INHALATION) ONCE AS NEEDED
Status: DISCONTINUED | OUTPATIENT
Start: 2022-02-24 | End: 2022-02-24 | Stop reason: HOSPADM

## 2022-02-24 RX ORDER — ONDANSETRON 2 MG/ML
4 INJECTION INTRAMUSCULAR; INTRAVENOUS EVERY 6 HOURS PRN
Status: DISCONTINUED | OUTPATIENT
Start: 2022-02-24 | End: 2022-03-04 | Stop reason: HOSPADM

## 2022-02-24 RX ORDER — LIDOCAINE HYDROCHLORIDE 10 MG/ML
INJECTION, SOLUTION INFILTRATION; PERINEURAL AS NEEDED
Status: DISCONTINUED | OUTPATIENT
Start: 2022-02-24 | End: 2022-02-24 | Stop reason: SURG

## 2022-02-24 RX ORDER — PROPOFOL 10 MG/ML
VIAL (ML) INTRAVENOUS AS NEEDED
Status: DISCONTINUED | OUTPATIENT
Start: 2022-02-24 | End: 2022-02-24 | Stop reason: SURG

## 2022-02-24 RX ORDER — FAMOTIDINE 20 MG/1
20 TABLET, FILM COATED ORAL ONCE
Status: DISCONTINUED | OUTPATIENT
Start: 2022-02-24 | End: 2022-02-24 | Stop reason: HOSPADM

## 2022-02-24 RX ORDER — HYDROCODONE BITARTRATE AND ACETAMINOPHEN 5; 325 MG/1; MG/1
1 TABLET ORAL EVERY 4 HOURS PRN
Status: DISCONTINUED | OUTPATIENT
Start: 2022-02-24 | End: 2022-03-04 | Stop reason: HOSPADM

## 2022-02-24 RX ORDER — CHOLECALCIFEROL (VITAMIN D3) 125 MCG
5 CAPSULE ORAL NIGHTLY PRN
Status: DISCONTINUED | OUTPATIENT
Start: 2022-02-24 | End: 2022-03-04 | Stop reason: HOSPADM

## 2022-02-24 RX ORDER — FAMOTIDINE 10 MG/ML
20 INJECTION, SOLUTION INTRAVENOUS ONCE
Status: DISCONTINUED | OUTPATIENT
Start: 2022-02-24 | End: 2022-02-24 | Stop reason: HOSPADM

## 2022-02-24 RX ORDER — SODIUM CHLORIDE, SODIUM LACTATE, POTASSIUM CHLORIDE, CALCIUM CHLORIDE 600; 310; 30; 20 MG/100ML; MG/100ML; MG/100ML; MG/100ML
30 INJECTION, SOLUTION INTRAVENOUS CONTINUOUS PRN
Status: CANCELLED | OUTPATIENT
Start: 2022-02-24

## 2022-02-24 RX ORDER — MORPHINE SULFATE 4 MG/ML
4 INJECTION, SOLUTION INTRAMUSCULAR; INTRAVENOUS ONCE
Status: COMPLETED | OUTPATIENT
Start: 2022-02-24 | End: 2022-02-24

## 2022-02-24 RX ORDER — PREDNISONE 1 MG/1
5 TABLET ORAL DAILY
Status: DISCONTINUED | OUTPATIENT
Start: 2022-02-24 | End: 2022-03-04 | Stop reason: HOSPADM

## 2022-02-24 RX ORDER — PANTOPRAZOLE SODIUM 40 MG/10ML
40 INJECTION, POWDER, LYOPHILIZED, FOR SOLUTION INTRAVENOUS EVERY 12 HOURS SCHEDULED
Status: DISCONTINUED | OUTPATIENT
Start: 2022-02-24 | End: 2022-02-27

## 2022-02-24 RX ORDER — LIDOCAINE HYDROCHLORIDE 10 MG/ML
0.5 INJECTION, SOLUTION EPIDURAL; INFILTRATION; INTRACAUDAL; PERINEURAL ONCE AS NEEDED
Status: DISCONTINUED | OUTPATIENT
Start: 2022-02-24 | End: 2022-02-24 | Stop reason: HOSPADM

## 2022-02-24 RX ORDER — METOCLOPRAMIDE HYDROCHLORIDE 5 MG/ML
10 INJECTION INTRAMUSCULAR; INTRAVENOUS ONCE
Status: COMPLETED | OUTPATIENT
Start: 2022-02-24 | End: 2022-02-24

## 2022-02-24 RX ORDER — MIDAZOLAM HYDROCHLORIDE 1 MG/ML
1 INJECTION INTRAMUSCULAR; INTRAVENOUS
Status: DISCONTINUED | OUTPATIENT
Start: 2022-02-24 | End: 2022-02-24 | Stop reason: HOSPADM

## 2022-02-24 RX ADMIN — HYDROCODONE BITARTRATE AND ACETAMINOPHEN 1 TABLET: 5; 325 TABLET ORAL at 21:23

## 2022-02-24 RX ADMIN — HYDROCODONE BITARTRATE AND ACETAMINOPHEN 1 TABLET: 5; 325 TABLET ORAL at 17:19

## 2022-02-24 RX ADMIN — PANTOPRAZOLE SODIUM 40 MG: 40 INJECTION, POWDER, FOR SOLUTION INTRAVENOUS at 20:32

## 2022-02-24 RX ADMIN — LIDOCAINE HYDROCHLORIDE 30 MG: 10 INJECTION, SOLUTION INFILTRATION; PERINEURAL at 11:33

## 2022-02-24 RX ADMIN — Medication 250 MG: at 20:32

## 2022-02-24 RX ADMIN — PROPOFOL 30 MG: 10 INJECTION, EMULSION INTRAVENOUS at 11:33

## 2022-02-24 RX ADMIN — PANTOPRAZOLE SODIUM 40 MG: 40 INJECTION, POWDER, FOR SOLUTION INTRAVENOUS at 09:04

## 2022-02-24 RX ADMIN — METOPROLOL TARTRATE 25 MG: 25 TABLET, FILM COATED ORAL at 09:04

## 2022-02-24 RX ADMIN — IOPAMIDOL 85 ML: 612 INJECTION, SOLUTION INTRAVENOUS at 04:32

## 2022-02-24 RX ADMIN — PROPOFOL 20 MG: 10 INJECTION, EMULSION INTRAVENOUS at 11:38

## 2022-02-24 RX ADMIN — METOPROLOL TARTRATE 25 MG: 25 TABLET, FILM COATED ORAL at 20:32

## 2022-02-24 RX ADMIN — ONDANSETRON 4 MG: 2 INJECTION INTRAMUSCULAR; INTRAVENOUS at 17:22

## 2022-02-24 RX ADMIN — METOCLOPRAMIDE 10 MG: 5 INJECTION, SOLUTION INTRAMUSCULAR; INTRAVENOUS at 04:09

## 2022-02-24 RX ADMIN — PANTOPRAZOLE SODIUM 80 MG: 40 INJECTION, POWDER, FOR SOLUTION INTRAVENOUS at 04:03

## 2022-02-24 RX ADMIN — Medication 5 MG: at 21:19

## 2022-02-24 RX ADMIN — SODIUM CHLORIDE 1 G: 900 INJECTION INTRAVENOUS at 04:55

## 2022-02-24 RX ADMIN — SODIUM CHLORIDE, POTASSIUM CHLORIDE, SODIUM LACTATE AND CALCIUM CHLORIDE 9 ML/HR: 600; 310; 30; 20 INJECTION, SOLUTION INTRAVENOUS at 11:22

## 2022-02-24 RX ADMIN — LIDOCAINE HYDROCHLORIDE: 20 SOLUTION ORAL; TOPICAL at 18:14

## 2022-02-24 RX ADMIN — OCTREOTIDE ACETATE 50 MCG/HR: 500 INJECTION, SOLUTION INTRAVENOUS; SUBCUTANEOUS at 05:59

## 2022-02-24 RX ADMIN — MORPHINE SULFATE 4 MG: 4 INJECTION, SOLUTION INTRAMUSCULAR; INTRAVENOUS at 04:05

## 2022-02-24 RX ADMIN — HYDROCODONE BITARTRATE AND ACETAMINOPHEN 1 TABLET: 5; 325 TABLET ORAL at 09:58

## 2022-02-24 NOTE — ANESTHESIA PREPROCEDURE EVALUATION
Anesthesia Evaluation     Patient summary reviewed and Nursing notes reviewed                Airway   Mallampati: I  TM distance: >3 FB  Neck ROM: full  No difficulty expected  Dental - normal exam   (+) edentulous    Pulmonary - negative pulmonary ROS and normal exam   Cardiovascular     (+) hypertension, dysrhythmias (xarelto tx) Paroxysmal Atrial Fib, murmur,     ROS comment:   Echo 11/21: normal EF, mild AI, mod MR, mild TR    Neuro/Psych- negative ROS  GI/Hepatic/Renal/Endo    (+)  GERD, GI bleeding , liver disease,     Musculoskeletal     Abdominal  - normal exam    Bowel sounds: normal.   Substance History - negative use     OB/GYN negative ob/gyn ROS         Other   arthritis,                    Anesthesia Plan    ASA 3     general   (Propofol)  intravenous induction           CODE STATUS:    Code Status (Patient has no pulse and is not breathing): CPR (Attempt to Resuscitate)  Medical Interventions (Patient has pulse or is breathing): Full Support

## 2022-02-24 NOTE — ANESTHESIA POSTPROCEDURE EVALUATION
Patient: Karen Pinonil    Procedure Summary     Date: 02/24/22 Room / Location:  [facility]    Anesthesia Start: 1128 Anesthesia Stop: 1153    Procedure: ESOPHAGOGASTRODUODENOSCOPY (N/A ) Diagnosis:     Surgeons: Brunner, Mark I, MD Provider: Adeel Ty MD    Anesthesia Type: general ASA Status: 3          Anesthesia Type: general    Vitals  No vitals data found for the desired time range.          Post Anesthesia Care and Evaluation    Patient location during evaluation: PACU  Patient participation: waiting for patient participation  Level of consciousness: responsive to verbal stimuli  Airway patency: patent  Anesthetic complications: No anesthetic complications  PONV Status: none  Cardiovascular status: stable  Respiratory status: spontaneous ventilation and room air  Hydration status: acceptable  No anesthesia care post op

## 2022-02-25 LAB
ANION GAP SERPL CALCULATED.3IONS-SCNC: 12 MMOL/L (ref 5–15)
BACTERIA SPEC AEROBE CULT: NORMAL
BASOPHILS # BLD AUTO: 0.06 10*3/MM3 (ref 0–0.2)
BASOPHILS NFR BLD AUTO: 1.7 % (ref 0–1.5)
BUN SERPL-MCNC: 16 MG/DL (ref 8–23)
BUN/CREAT SERPL: 32.7 (ref 7–25)
CALCIUM SPEC-SCNC: 9.2 MG/DL (ref 8.6–10.5)
CHLORIDE SERPL-SCNC: 102 MMOL/L (ref 98–107)
CO2 SERPL-SCNC: 25 MMOL/L (ref 22–29)
CREAT SERPL-MCNC: 0.49 MG/DL (ref 0.57–1)
DEPRECATED RDW RBC AUTO: 50.2 FL (ref 37–54)
EOSINOPHIL # BLD AUTO: 0.09 10*3/MM3 (ref 0–0.4)
EOSINOPHIL NFR BLD AUTO: 2.5 % (ref 0.3–6.2)
ERYTHROCYTE [DISTWIDTH] IN BLOOD BY AUTOMATED COUNT: 14.2 % (ref 12.3–15.4)
GFR SERPL CREATININE-BSD FRML MDRD: 127 ML/MIN/1.73
GLUCOSE SERPL-MCNC: 80 MG/DL (ref 65–99)
HCT VFR BLD AUTO: 37 % (ref 34–46.6)
HCT VFR BLD AUTO: 41.2 % (ref 34–46.6)
HGB BLD-MCNC: 12.4 G/DL (ref 12–15.9)
HGB BLD-MCNC: 13 G/DL (ref 12–15.9)
IMM GRANULOCYTES # BLD AUTO: 0.04 10*3/MM3 (ref 0–0.05)
IMM GRANULOCYTES NFR BLD AUTO: 1.1 % (ref 0–0.5)
LYMPHOCYTES # BLD AUTO: 0.35 10*3/MM3 (ref 0.7–3.1)
LYMPHOCYTES NFR BLD AUTO: 9.9 % (ref 19.6–45.3)
MCH RBC QN AUTO: 30.2 PG (ref 26.6–33)
MCHC RBC AUTO-ENTMCNC: 31.6 G/DL (ref 31.5–35.7)
MCV RBC AUTO: 95.8 FL (ref 79–97)
MONOCYTES # BLD AUTO: 0.38 10*3/MM3 (ref 0.1–0.9)
MONOCYTES NFR BLD AUTO: 10.7 % (ref 5–12)
NEUTROPHILS NFR BLD AUTO: 2.63 10*3/MM3 (ref 1.7–7)
NEUTROPHILS NFR BLD AUTO: 74.1 % (ref 42.7–76)
NRBC BLD AUTO-RTO: 0 /100 WBC (ref 0–0.2)
PLAT MORPH BLD: NORMAL
PLATELET # BLD AUTO: 284 10*3/MM3 (ref 140–450)
PMV BLD AUTO: 9.8 FL (ref 6–12)
POTASSIUM SERPL-SCNC: 3.6 MMOL/L (ref 3.5–5.2)
POTASSIUM SERPL-SCNC: 4.4 MMOL/L (ref 3.5–5.2)
QT INTERVAL: 324 MS
QT INTERVAL: 368 MS
QTC INTERVAL: 394 MS
QTC INTERVAL: 427 MS
RBC # BLD AUTO: 4.3 10*6/MM3 (ref 3.77–5.28)
RBC MORPH BLD: NORMAL
SODIUM SERPL-SCNC: 139 MMOL/L (ref 136–145)
TSH SERPL DL<=0.05 MIU/L-ACNC: 0.94 UIU/ML (ref 0.27–4.2)
WBC MORPH BLD: NORMAL
WBC NRBC COR # BLD: 3.55 10*3/MM3 (ref 3.4–10.8)

## 2022-02-25 PROCEDURE — 99222 1ST HOSP IP/OBS MODERATE 55: CPT | Performed by: CLINICAL NURSE SPECIALIST

## 2022-02-25 PROCEDURE — 97162 PT EVAL MOD COMPLEX 30 MIN: CPT

## 2022-02-25 PROCEDURE — 80048 BASIC METABOLIC PNL TOTAL CA: CPT | Performed by: PHYSICIAN ASSISTANT

## 2022-02-25 PROCEDURE — 85007 BL SMEAR W/DIFF WBC COUNT: CPT | Performed by: PHYSICIAN ASSISTANT

## 2022-02-25 PROCEDURE — 97110 THERAPEUTIC EXERCISES: CPT

## 2022-02-25 PROCEDURE — 63710000001 PREDNISONE PER 5 MG: Performed by: NURSE PRACTITIONER

## 2022-02-25 PROCEDURE — G0378 HOSPITAL OBSERVATION PER HR: HCPCS

## 2022-02-25 PROCEDURE — 84443 ASSAY THYROID STIM HORMONE: CPT | Performed by: CLINICAL NURSE SPECIALIST

## 2022-02-25 PROCEDURE — 85025 COMPLETE CBC W/AUTO DIFF WBC: CPT | Performed by: PHYSICIAN ASSISTANT

## 2022-02-25 PROCEDURE — 97530 THERAPEUTIC ACTIVITIES: CPT

## 2022-02-25 PROCEDURE — 97166 OT EVAL MOD COMPLEX 45 MIN: CPT

## 2022-02-25 PROCEDURE — 84132 ASSAY OF SERUM POTASSIUM: CPT | Performed by: INTERNAL MEDICINE

## 2022-02-25 PROCEDURE — 99232 SBSQ HOSP IP/OBS MODERATE 35: CPT | Performed by: INTERNAL MEDICINE

## 2022-02-25 RX ORDER — POTASSIUM CHLORIDE 750 MG/1
40 CAPSULE, EXTENDED RELEASE ORAL AS NEEDED
Status: DISCONTINUED | OUTPATIENT
Start: 2022-02-25 | End: 2022-03-04 | Stop reason: HOSPADM

## 2022-02-25 RX ORDER — POTASSIUM CHLORIDE 1.5 G/1.77G
40 POWDER, FOR SOLUTION ORAL AS NEEDED
Status: DISCONTINUED | OUTPATIENT
Start: 2022-02-25 | End: 2022-03-04 | Stop reason: HOSPADM

## 2022-02-25 RX ORDER — ATORVASTATIN CALCIUM 40 MG/1
80 TABLET, FILM COATED ORAL NIGHTLY
Status: DISCONTINUED | OUTPATIENT
Start: 2022-02-26 | End: 2022-03-04 | Stop reason: HOSPADM

## 2022-02-25 RX ORDER — POTASSIUM CHLORIDE 7.45 MG/ML
10 INJECTION INTRAVENOUS
Status: DISCONTINUED | OUTPATIENT
Start: 2022-02-25 | End: 2022-03-04 | Stop reason: HOSPADM

## 2022-02-25 RX ORDER — SODIUM CHLORIDE 0.9 % (FLUSH) 0.9 %
10 SYRINGE (ML) INJECTION EVERY 12 HOURS SCHEDULED
Status: DISCONTINUED | OUTPATIENT
Start: 2022-02-26 | End: 2022-03-04 | Stop reason: HOSPADM

## 2022-02-25 RX ORDER — SODIUM CHLORIDE 0.9 % (FLUSH) 0.9 %
10 SYRINGE (ML) INJECTION AS NEEDED
Status: DISCONTINUED | OUTPATIENT
Start: 2022-02-25 | End: 2022-03-04 | Stop reason: HOSPADM

## 2022-02-25 RX ORDER — MAGNESIUM SULFATE HEPTAHYDRATE 40 MG/ML
2 INJECTION, SOLUTION INTRAVENOUS AS NEEDED
Status: DISCONTINUED | OUTPATIENT
Start: 2022-02-25 | End: 2022-03-04 | Stop reason: HOSPADM

## 2022-02-25 RX ORDER — MAGNESIUM SULFATE HEPTAHYDRATE 40 MG/ML
4 INJECTION, SOLUTION INTRAVENOUS AS NEEDED
Status: DISCONTINUED | OUTPATIENT
Start: 2022-02-25 | End: 2022-03-04 | Stop reason: HOSPADM

## 2022-02-25 RX ADMIN — POTASSIUM CHLORIDE 40 MEQ: 750 CAPSULE, EXTENDED RELEASE ORAL at 16:12

## 2022-02-25 RX ADMIN — PANTOPRAZOLE SODIUM 40 MG: 40 INJECTION, POWDER, FOR SOLUTION INTRAVENOUS at 08:33

## 2022-02-25 RX ADMIN — PREDNISONE 5 MG: 5 TABLET ORAL at 08:33

## 2022-02-25 RX ADMIN — Medication 10 ML: at 21:09

## 2022-02-25 RX ADMIN — HYDROCODONE BITARTRATE AND ACETAMINOPHEN 1 TABLET: 5; 325 TABLET ORAL at 15:09

## 2022-02-25 RX ADMIN — METOPROLOL TARTRATE 25 MG: 25 TABLET, FILM COATED ORAL at 21:09

## 2022-02-25 RX ADMIN — PANTOPRAZOLE SODIUM 40 MG: 40 INJECTION, POWDER, FOR SOLUTION INTRAVENOUS at 21:09

## 2022-02-25 RX ADMIN — FERROUS SULFATE TAB 325 MG (65 MG ELEMENTAL FE) 325 MG: 325 (65 FE) TAB at 17:33

## 2022-02-25 RX ADMIN — METOPROLOL TARTRATE 25 MG: 25 TABLET, FILM COATED ORAL at 08:38

## 2022-02-25 RX ADMIN — Medication 250 MG: at 21:09

## 2022-02-25 RX ADMIN — Medication 250 MG: at 08:33

## 2022-02-25 RX ADMIN — ATORVASTATIN CALCIUM 80 MG: 40 TABLET, FILM COATED ORAL at 23:46

## 2022-02-25 RX ADMIN — FERROUS SULFATE TAB 325 MG (65 MG ELEMENTAL FE) 325 MG: 325 (65 FE) TAB at 08:33

## 2022-02-25 RX ADMIN — POTASSIUM CHLORIDE 40 MEQ: 750 CAPSULE, EXTENDED RELEASE ORAL at 12:01

## 2022-02-26 ENCOUNTER — APPOINTMENT (OUTPATIENT)
Dept: MRI IMAGING | Facility: HOSPITAL | Age: 67
End: 2022-02-26

## 2022-02-26 ENCOUNTER — APPOINTMENT (OUTPATIENT)
Dept: CARDIOLOGY | Facility: HOSPITAL | Age: 67
End: 2022-02-26

## 2022-02-26 LAB
BH CV ECHO MEAS - AI DEC SLOPE: 143 CM/SEC^2
BH CV ECHO MEAS - AI MAX PG: 44.8 MMHG
BH CV ECHO MEAS - AI MAX VEL: 334.5 CM/SEC
BH CV ECHO MEAS - AI P1/2T: 685.1 MSEC
BH CV ECHO MEAS - AO MAX PG (FULL): 16.6 MMHG
BH CV ECHO MEAS - AO MAX PG: 20 MMHG
BH CV ECHO MEAS - AO MEAN PG (FULL): 10 MMHG
BH CV ECHO MEAS - AO MEAN PG: 11 MMHG
BH CV ECHO MEAS - AO ROOT AREA (BSA CORRECTED): 2.2
BH CV ECHO MEAS - AO ROOT AREA: 6.6 CM^2
BH CV ECHO MEAS - AO ROOT DIAM: 2.9 CM
BH CV ECHO MEAS - AO V2 MAX: 225.2 CM/SEC
BH CV ECHO MEAS - AO V2 MEAN: 148.4 CM/SEC
BH CV ECHO MEAS - AO V2 VTI: 40.5 CM
BH CV ECHO MEAS - ASC AORTA: 2.9 CM
BH CV ECHO MEAS - AVA(I,A): 1.3 CM^2
BH CV ECHO MEAS - AVA(I,D): 1.3 CM^2
BH CV ECHO MEAS - AVA(V,A): 1.2 CM^2
BH CV ECHO MEAS - AVA(V,D): 1.2 CM^2
BH CV ECHO MEAS - BSA(HAYCOCK): 1.3 M^2
BH CV ECHO MEAS - BSA: 1.3 M^2
BH CV ECHO MEAS - BZI_BMI: 21.7 KILOGRAMS/M^2
BH CV ECHO MEAS - BZI_METRIC_HEIGHT: 142.2 CM
BH CV ECHO MEAS - BZI_METRIC_WEIGHT: 44 KG
BH CV ECHO MEAS - EDV(CUBED): 15.6 ML
BH CV ECHO MEAS - EDV(MOD-SP2): 15.4 ML
BH CV ECHO MEAS - EDV(MOD-SP4): 30.5 ML
BH CV ECHO MEAS - EDV(TEICH): 22.3 ML
BH CV ECHO MEAS - EF(CUBED): 73.8 %
BH CV ECHO MEAS - EF(MOD-BP): 70.2 %
BH CV ECHO MEAS - EF(MOD-SP2): 43.6 %
BH CV ECHO MEAS - EF(MOD-SP4): 85.4 %
BH CV ECHO MEAS - EF(TEICH): 67.9 %
BH CV ECHO MEAS - ESV(CUBED): 4.1 ML
BH CV ECHO MEAS - ESV(MOD-SP2): 8.7 ML
BH CV ECHO MEAS - ESV(MOD-SP4): 4.5 ML
BH CV ECHO MEAS - ESV(TEICH): 7.2 ML
BH CV ECHO MEAS - FS: 36 %
BH CV ECHO MEAS - IVS/LVPW: 1
BH CV ECHO MEAS - IVSD: 1.1 CM
BH CV ECHO MEAS - LA DIMENSION: 3.5 CM
BH CV ECHO MEAS - LA/AO: 1.2
BH CV ECHO MEAS - LAD MAJOR: 6.3 CM
BH CV ECHO MEAS - LV DIASTOLIC VOL/BSA (35-75): 23.4 ML/M^2
BH CV ECHO MEAS - LV MASS(C)D: 74 GRAMS
BH CV ECHO MEAS - LV MASS(C)DI: 56.7 GRAMS/M^2
BH CV ECHO MEAS - LV MAX PG: 3.4 MMHG
BH CV ECHO MEAS - LV MEAN PG: 1 MMHG
BH CV ECHO MEAS - LV SYSTOLIC VOL/BSA (12-30): 3.4 ML/M^2
BH CV ECHO MEAS - LV V1 MAX: 92.3 CM/SEC
BH CV ECHO MEAS - LV V1 MEAN: 51.7 CM/SEC
BH CV ECHO MEAS - LV V1 VTI: 18.1 CM
BH CV ECHO MEAS - LVIDD: 2.5 CM
BH CV ECHO MEAS - LVIDS: 1.6 CM
BH CV ECHO MEAS - LVLD AP2: 6.5 CM
BH CV ECHO MEAS - LVLD AP4: 6.3 CM
BH CV ECHO MEAS - LVLS AP2: 5 CM
BH CV ECHO MEAS - LVLS AP4: 5.5 CM
BH CV ECHO MEAS - LVOT AREA (M): 2.8 CM^2
BH CV ECHO MEAS - LVOT AREA: 2.8 CM^2
BH CV ECHO MEAS - LVOT DIAM: 1.9 CM
BH CV ECHO MEAS - LVPWD: 1.1 CM
BH CV ECHO MEAS - MR MAX PG: 89 MMHG
BH CV ECHO MEAS - MR MAX VEL: 472 CM/SEC
BH CV ECHO MEAS - MR MEAN PG: 56 MMHG
BH CV ECHO MEAS - MR MEAN VEL: 340 CM/SEC
BH CV ECHO MEAS - MR VTI: 141 CM
BH CV ECHO MEAS - MV DEC SLOPE: 432 CM/SEC^2
BH CV ECHO MEAS - MV DEC TIME: 0.22 SEC
BH CV ECHO MEAS - MV E MAX VEL: 95.5 CM/SEC
BH CV ECHO MEAS - MV MAX PG: 3.9 MMHG
BH CV ECHO MEAS - MV MEAN PG: 2 MMHG
BH CV ECHO MEAS - MV V2 MAX: 99.1 CM/SEC
BH CV ECHO MEAS - MV V2 MEAN: 57.4 CM/SEC
BH CV ECHO MEAS - MV V2 VTI: 19 CM
BH CV ECHO MEAS - MVA(VTI): 2.7 CM^2
BH CV ECHO MEAS - PA ACC TIME: 0.11 SEC
BH CV ECHO MEAS - PA MAX PG: 3.4 MMHG
BH CV ECHO MEAS - PA PR(ACCEL): 28.2 MMHG
BH CV ECHO MEAS - PA V2 MAX: 92.1 CM/SEC
BH CV ECHO MEAS - RAP SYSTOLE: 8 MMHG
BH CV ECHO MEAS - RVSP: 28 MMHG
BH CV ECHO MEAS - SI(AO): 204.7 ML/M^2
BH CV ECHO MEAS - SI(CUBED): 8.8 ML/M^2
BH CV ECHO MEAS - SI(LVOT): 39.3 ML/M^2
BH CV ECHO MEAS - SI(MOD-SP2): 5.1 ML/M^2
BH CV ECHO MEAS - SI(MOD-SP4): 20 ML/M^2
BH CV ECHO MEAS - SI(TEICH): 11.6 ML/M^2
BH CV ECHO MEAS - SV(AO): 267.2 ML
BH CV ECHO MEAS - SV(CUBED): 11.5 ML
BH CV ECHO MEAS - SV(LVOT): 51.3 ML
BH CV ECHO MEAS - SV(MOD-SP2): 6.7 ML
BH CV ECHO MEAS - SV(MOD-SP4): 26.1 ML
BH CV ECHO MEAS - SV(TEICH): 15.2 ML
BH CV ECHO MEAS - TAPSE (>1.6): 1.8 CM
BH CV ECHO MEAS - TR MAX PG: 20 MMHG
BH CV ECHO MEAS - TR MAX VEL: 220.8 CM/SEC
BH CV ECHO MEAS - TV E MAX VEL: 53.3 CM/SEC
BH CV VAS BP RIGHT ARM: NORMAL MMHG
BH CV XLRA - RV BASE: 2.8 CM
BH CV XLRA - RV LENGTH: 5.3 CM
BH CV XLRA - RV MID: 3 CM
CHOLEST SERPL-MCNC: 159 MG/DL (ref 0–200)
FOLATE SERPL-MCNC: 15.6 NG/ML (ref 4.78–24.2)
GLUCOSE BLDC GLUCOMTR-MCNC: 73 MG/DL (ref 70–130)
GLUCOSE BLDC GLUCOMTR-MCNC: 80 MG/DL (ref 70–130)
HBA1C MFR BLD: 4.9 % (ref 4.8–5.6)
HDLC SERPL-MCNC: 58 MG/DL (ref 40–60)
LDLC SERPL CALC-MCNC: 83 MG/DL (ref 0–100)
LDLC/HDLC SERPL: 1.4 {RATIO}
LEFT ATRIUM VOLUME INDEX: 52.3 ML/M^2
LEFT ATRIUM VOLUME: 68.3 ML
LV EF 2D ECHO EST: 65 %
MAXIMAL PREDICTED HEART RATE: 156 BPM
QT INTERVAL: 394 MS
QT INTERVAL: 412 MS
QTC INTERVAL: 460 MS
QTC INTERVAL: 466 MS
STRESS TARGET HR: 133 BPM
TRIGL SERPL-MCNC: 98 MG/DL (ref 0–150)
VIT B12 BLD-MCNC: 624 PG/ML (ref 211–946)
VLDLC SERPL-MCNC: 18 MG/DL (ref 5–40)

## 2022-02-26 PROCEDURE — 82746 ASSAY OF FOLIC ACID SERUM: CPT | Performed by: CLINICAL NURSE SPECIALIST

## 2022-02-26 PROCEDURE — G0378 HOSPITAL OBSERVATION PER HR: HCPCS

## 2022-02-26 PROCEDURE — 83036 HEMOGLOBIN GLYCOSYLATED A1C: CPT | Performed by: CLINICAL NURSE SPECIALIST

## 2022-02-26 PROCEDURE — 70551 MRI BRAIN STEM W/O DYE: CPT

## 2022-02-26 PROCEDURE — 99232 SBSQ HOSP IP/OBS MODERATE 35: CPT | Performed by: NURSE PRACTITIONER

## 2022-02-26 PROCEDURE — 82607 VITAMIN B-12: CPT | Performed by: CLINICAL NURSE SPECIALIST

## 2022-02-26 PROCEDURE — 97168 OT RE-EVAL EST PLAN CARE: CPT | Performed by: OCCUPATIONAL THERAPIST

## 2022-02-26 PROCEDURE — 97530 THERAPEUTIC ACTIVITIES: CPT

## 2022-02-26 PROCEDURE — 82962 GLUCOSE BLOOD TEST: CPT

## 2022-02-26 PROCEDURE — 63710000001 PREDNISONE PER 5 MG: Performed by: NURSE PRACTITIONER

## 2022-02-26 PROCEDURE — 99233 SBSQ HOSP IP/OBS HIGH 50: CPT | Performed by: STUDENT IN AN ORGANIZED HEALTH CARE EDUCATION/TRAINING PROGRAM

## 2022-02-26 PROCEDURE — 0 GADOBENATE DIMEGLUMINE 529 MG/ML SOLUTION: Performed by: INTERNAL MEDICINE

## 2022-02-26 PROCEDURE — 97164 PT RE-EVAL EST PLAN CARE: CPT

## 2022-02-26 PROCEDURE — 70544 MR ANGIOGRAPHY HEAD W/O DYE: CPT

## 2022-02-26 PROCEDURE — 80061 LIPID PANEL: CPT | Performed by: CLINICAL NURSE SPECIALIST

## 2022-02-26 PROCEDURE — 93306 TTE W/DOPPLER COMPLETE: CPT | Performed by: INTERNAL MEDICINE

## 2022-02-26 PROCEDURE — A9577 INJ MULTIHANCE: HCPCS | Performed by: INTERNAL MEDICINE

## 2022-02-26 PROCEDURE — 97535 SELF CARE MNGMENT TRAINING: CPT | Performed by: OCCUPATIONAL THERAPIST

## 2022-02-26 PROCEDURE — 70548 MR ANGIOGRAPHY NECK W/DYE: CPT

## 2022-02-26 PROCEDURE — 93306 TTE W/DOPPLER COMPLETE: CPT

## 2022-02-26 PROCEDURE — 97530 THERAPEUTIC ACTIVITIES: CPT | Performed by: OCCUPATIONAL THERAPIST

## 2022-02-26 PROCEDURE — 92523 SPEECH SOUND LANG COMPREHEN: CPT

## 2022-02-26 RX ADMIN — Medication 5 MG: at 01:05

## 2022-02-26 RX ADMIN — PANTOPRAZOLE SODIUM 40 MG: 40 INJECTION, POWDER, FOR SOLUTION INTRAVENOUS at 08:40

## 2022-02-26 RX ADMIN — PREDNISONE 5 MG: 5 TABLET ORAL at 08:40

## 2022-02-26 RX ADMIN — HYDROCODONE BITARTRATE AND ACETAMINOPHEN 1 TABLET: 5; 325 TABLET ORAL at 20:12

## 2022-02-26 RX ADMIN — FERROUS SULFATE TAB 325 MG (65 MG ELEMENTAL FE) 325 MG: 325 (65 FE) TAB at 17:50

## 2022-02-26 RX ADMIN — GADOBENATE DIMEGLUMINE 7 ML: 529 INJECTION, SOLUTION INTRAVENOUS at 05:13

## 2022-02-26 RX ADMIN — Medication 5 MG: at 20:05

## 2022-02-26 RX ADMIN — Medication 10 ML: at 08:40

## 2022-02-26 RX ADMIN — ATORVASTATIN CALCIUM 80 MG: 40 TABLET, FILM COATED ORAL at 20:12

## 2022-02-26 RX ADMIN — METOPROLOL TARTRATE 25 MG: 25 TABLET, FILM COATED ORAL at 08:40

## 2022-02-26 RX ADMIN — PANTOPRAZOLE SODIUM 40 MG: 40 INJECTION, POWDER, FOR SOLUTION INTRAVENOUS at 20:06

## 2022-02-26 RX ADMIN — RIVAROXABAN 20 MG: 20 TABLET, FILM COATED ORAL at 17:50

## 2022-02-26 RX ADMIN — FERROUS SULFATE TAB 325 MG (65 MG ELEMENTAL FE) 325 MG: 325 (65 FE) TAB at 08:40

## 2022-02-26 RX ADMIN — Medication 250 MG: at 20:05

## 2022-02-26 RX ADMIN — METOPROLOL TARTRATE 25 MG: 25 TABLET, FILM COATED ORAL at 20:05

## 2022-02-26 RX ADMIN — Medication 10 ML: at 20:06

## 2022-02-26 RX ADMIN — HYDROCODONE BITARTRATE AND ACETAMINOPHEN 1 TABLET: 5; 325 TABLET ORAL at 01:05

## 2022-02-26 RX ADMIN — Medication 250 MG: at 08:40

## 2022-02-26 RX ADMIN — Medication 10 ML: at 08:42

## 2022-02-27 PROCEDURE — 63710000001 PREDNISONE PER 5 MG: Performed by: NURSE PRACTITIONER

## 2022-02-27 PROCEDURE — 99232 SBSQ HOSP IP/OBS MODERATE 35: CPT | Performed by: NURSE PRACTITIONER

## 2022-02-27 PROCEDURE — G0378 HOSPITAL OBSERVATION PER HR: HCPCS

## 2022-02-27 RX ORDER — PANTOPRAZOLE SODIUM 40 MG/1
40 TABLET, DELAYED RELEASE ORAL
Status: DISCONTINUED | OUTPATIENT
Start: 2022-02-27 | End: 2022-03-02

## 2022-02-27 RX ADMIN — PANTOPRAZOLE SODIUM 40 MG: 40 TABLET, DELAYED RELEASE ORAL at 18:43

## 2022-02-27 RX ADMIN — ATORVASTATIN CALCIUM 80 MG: 40 TABLET, FILM COATED ORAL at 20:13

## 2022-02-27 RX ADMIN — FERROUS SULFATE TAB 325 MG (65 MG ELEMENTAL FE) 325 MG: 325 (65 FE) TAB at 08:59

## 2022-02-27 RX ADMIN — PREDNISONE 5 MG: 5 TABLET ORAL at 08:59

## 2022-02-27 RX ADMIN — Medication 250 MG: at 08:59

## 2022-02-27 RX ADMIN — METOPROLOL TARTRATE 25 MG: 25 TABLET, FILM COATED ORAL at 08:59

## 2022-02-27 RX ADMIN — PANTOPRAZOLE SODIUM 40 MG: 40 INJECTION, POWDER, FOR SOLUTION INTRAVENOUS at 08:59

## 2022-02-27 RX ADMIN — FERROUS SULFATE TAB 325 MG (65 MG ELEMENTAL FE) 325 MG: 325 (65 FE) TAB at 18:43

## 2022-02-27 RX ADMIN — Medication 10 ML: at 20:14

## 2022-02-27 RX ADMIN — Medication 250 MG: at 20:13

## 2022-02-27 RX ADMIN — RIVAROXABAN 20 MG: 20 TABLET, FILM COATED ORAL at 18:43

## 2022-02-27 RX ADMIN — HYDROCODONE BITARTRATE AND ACETAMINOPHEN 1 TABLET: 5; 325 TABLET ORAL at 05:38

## 2022-02-27 RX ADMIN — Medication 10 ML: at 09:00

## 2022-02-27 RX ADMIN — Medication 10 ML: at 20:13

## 2022-02-27 RX ADMIN — Medication 5 MG: at 20:13

## 2022-02-27 RX ADMIN — METOPROLOL TARTRATE 25 MG: 25 TABLET, FILM COATED ORAL at 20:12

## 2022-02-28 PROBLEM — I63.89 INFARCTION OF PARIETAL LOBE: Status: ACTIVE | Noted: 2022-02-28

## 2022-02-28 LAB
HCT VFR BLD AUTO: 42.4 % (ref 34–46.6)
HGB BLD-MCNC: 13.4 G/DL (ref 12–15.9)

## 2022-02-28 PROCEDURE — 85014 HEMATOCRIT: CPT | Performed by: NURSE PRACTITIONER

## 2022-02-28 PROCEDURE — 99232 SBSQ HOSP IP/OBS MODERATE 35: CPT | Performed by: NURSE PRACTITIONER

## 2022-02-28 PROCEDURE — 85018 HEMOGLOBIN: CPT | Performed by: NURSE PRACTITIONER

## 2022-02-28 PROCEDURE — 63710000001 PREDNISONE PER 5 MG: Performed by: NURSE PRACTITIONER

## 2022-02-28 PROCEDURE — 97530 THERAPEUTIC ACTIVITIES: CPT

## 2022-02-28 RX ADMIN — FERROUS SULFATE TAB 325 MG (65 MG ELEMENTAL FE) 325 MG: 325 (65 FE) TAB at 17:18

## 2022-02-28 RX ADMIN — Medication 5 MG: at 21:31

## 2022-02-28 RX ADMIN — ATORVASTATIN CALCIUM 80 MG: 40 TABLET, FILM COATED ORAL at 20:36

## 2022-02-28 RX ADMIN — FERROUS SULFATE TAB 325 MG (65 MG ELEMENTAL FE) 325 MG: 325 (65 FE) TAB at 08:01

## 2022-02-28 RX ADMIN — HYDROCODONE BITARTRATE AND ACETAMINOPHEN 1 TABLET: 5; 325 TABLET ORAL at 10:53

## 2022-02-28 RX ADMIN — PANTOPRAZOLE SODIUM 40 MG: 40 TABLET, DELAYED RELEASE ORAL at 08:01

## 2022-02-28 RX ADMIN — PREDNISONE 5 MG: 5 TABLET ORAL at 08:01

## 2022-02-28 RX ADMIN — Medication 10 ML: at 20:36

## 2022-02-28 RX ADMIN — Medication 250 MG: at 08:01

## 2022-02-28 RX ADMIN — RIVAROXABAN 20 MG: 20 TABLET, FILM COATED ORAL at 17:19

## 2022-02-28 RX ADMIN — PANTOPRAZOLE SODIUM 40 MG: 40 TABLET, DELAYED RELEASE ORAL at 17:18

## 2022-02-28 RX ADMIN — METOPROLOL TARTRATE 25 MG: 25 TABLET, FILM COATED ORAL at 20:35

## 2022-02-28 RX ADMIN — METOPROLOL TARTRATE 25 MG: 25 TABLET, FILM COATED ORAL at 08:01

## 2022-02-28 RX ADMIN — Medication 250 MG: at 20:36

## 2022-03-01 PROCEDURE — 99232 SBSQ HOSP IP/OBS MODERATE 35: CPT | Performed by: INTERNAL MEDICINE

## 2022-03-01 PROCEDURE — 63710000001 PREDNISONE PER 5 MG: Performed by: NURSE PRACTITIONER

## 2022-03-01 PROCEDURE — 63710000001 ONDANSETRON PER 8 MG: Performed by: PHYSICIAN ASSISTANT

## 2022-03-01 PROCEDURE — 97110 THERAPEUTIC EXERCISES: CPT

## 2022-03-01 PROCEDURE — 97530 THERAPEUTIC ACTIVITIES: CPT

## 2022-03-01 RX ADMIN — PANTOPRAZOLE SODIUM 40 MG: 40 TABLET, DELAYED RELEASE ORAL at 07:53

## 2022-03-01 RX ADMIN — HYDROCODONE BITARTRATE AND ACETAMINOPHEN 1 TABLET: 5; 325 TABLET ORAL at 12:46

## 2022-03-01 RX ADMIN — HYDROCODONE BITARTRATE AND ACETAMINOPHEN 1 TABLET: 5; 325 TABLET ORAL at 16:26

## 2022-03-01 RX ADMIN — Medication 250 MG: at 07:54

## 2022-03-01 RX ADMIN — METOPROLOL TARTRATE 25 MG: 25 TABLET, FILM COATED ORAL at 20:57

## 2022-03-01 RX ADMIN — PREDNISONE 5 MG: 5 TABLET ORAL at 07:54

## 2022-03-01 RX ADMIN — HYDROCODONE BITARTRATE AND ACETAMINOPHEN 1 TABLET: 5; 325 TABLET ORAL at 07:53

## 2022-03-01 RX ADMIN — RIVAROXABAN 20 MG: 20 TABLET, FILM COATED ORAL at 18:43

## 2022-03-01 RX ADMIN — ONDANSETRON HYDROCHLORIDE 4 MG: 4 TABLET, FILM COATED ORAL at 00:55

## 2022-03-01 RX ADMIN — FERROUS SULFATE TAB 325 MG (65 MG ELEMENTAL FE) 325 MG: 325 (65 FE) TAB at 07:53

## 2022-03-01 RX ADMIN — HYDROCODONE BITARTRATE AND ACETAMINOPHEN 1 TABLET: 5; 325 TABLET ORAL at 23:06

## 2022-03-01 RX ADMIN — PANTOPRAZOLE SODIUM 40 MG: 40 TABLET, DELAYED RELEASE ORAL at 16:26

## 2022-03-01 RX ADMIN — ATORVASTATIN CALCIUM 80 MG: 40 TABLET, FILM COATED ORAL at 20:57

## 2022-03-01 RX ADMIN — Medication 250 MG: at 20:57

## 2022-03-01 RX ADMIN — METOPROLOL TARTRATE 25 MG: 25 TABLET, FILM COATED ORAL at 07:53

## 2022-03-01 RX ADMIN — FERROUS SULFATE TAB 325 MG (65 MG ELEMENTAL FE) 325 MG: 325 (65 FE) TAB at 18:43

## 2022-03-02 ENCOUNTER — APPOINTMENT (OUTPATIENT)
Dept: GENERAL RADIOLOGY | Facility: HOSPITAL | Age: 67
End: 2022-03-02

## 2022-03-02 LAB
ALBUMIN SERPL-MCNC: 3.8 G/DL (ref 3.5–5.2)
ALBUMIN/GLOB SERPL: 1.2 G/DL
ALP SERPL-CCNC: 364 U/L (ref 39–117)
ALT SERPL W P-5'-P-CCNC: 16 U/L (ref 1–33)
ANION GAP SERPL CALCULATED.3IONS-SCNC: 19 MMOL/L (ref 5–15)
AST SERPL-CCNC: 42 U/L (ref 1–32)
BASOPHILS # BLD AUTO: 0.1 10*3/MM3 (ref 0–0.2)
BASOPHILS NFR BLD AUTO: 1.6 % (ref 0–1.5)
BILIRUB SERPL-MCNC: 0.8 MG/DL (ref 0–1.2)
BUN SERPL-MCNC: 13 MG/DL (ref 8–23)
BUN/CREAT SERPL: 23.2 (ref 7–25)
CALCIUM SPEC-SCNC: 9.7 MG/DL (ref 8.6–10.5)
CHLORIDE SERPL-SCNC: 103 MMOL/L (ref 98–107)
CO2 SERPL-SCNC: 16 MMOL/L (ref 22–29)
CREAT SERPL-MCNC: 0.56 MG/DL (ref 0.57–1)
DEPRECATED RDW RBC AUTO: 49.1 FL (ref 37–54)
EGFRCR SERPLBLD CKD-EPI 2021: 102.1 ML/MIN/1.73
EOSINOPHIL # BLD AUTO: 0.09 10*3/MM3 (ref 0–0.4)
EOSINOPHIL NFR BLD AUTO: 1.4 % (ref 0.3–6.2)
ERYTHROCYTE [DISTWIDTH] IN BLOOD BY AUTOMATED COUNT: 14 % (ref 12.3–15.4)
GLOBULIN UR ELPH-MCNC: 3.3 GM/DL
GLUCOSE BLDC GLUCOMTR-MCNC: 116 MG/DL (ref 70–130)
GLUCOSE SERPL-MCNC: 142 MG/DL (ref 65–99)
HCT VFR BLD AUTO: 45.6 % (ref 34–46.6)
HCT VFR BLD AUTO: 54.1 % (ref 34–46.6)
HGB BLD-MCNC: 14.8 G/DL (ref 12–15.9)
HGB BLD-MCNC: 16.8 G/DL (ref 12–15.9)
IMM GRANULOCYTES # BLD AUTO: 0.08 10*3/MM3 (ref 0–0.05)
IMM GRANULOCYTES NFR BLD AUTO: 1.3 % (ref 0–0.5)
LYMPHOCYTES # BLD AUTO: 1.77 10*3/MM3 (ref 0.7–3.1)
LYMPHOCYTES NFR BLD AUTO: 28.3 % (ref 19.6–45.3)
MCH RBC QN AUTO: 30.9 PG (ref 26.6–33)
MCHC RBC AUTO-ENTMCNC: 32.5 G/DL (ref 31.5–35.7)
MCV RBC AUTO: 95.2 FL (ref 79–97)
MONOCYTES # BLD AUTO: 0.55 10*3/MM3 (ref 0.1–0.9)
MONOCYTES NFR BLD AUTO: 8.8 % (ref 5–12)
NEUTROPHILS NFR BLD AUTO: 3.67 10*3/MM3 (ref 1.7–7)
NEUTROPHILS NFR BLD AUTO: 58.6 % (ref 42.7–76)
NRBC BLD AUTO-RTO: 0 /100 WBC (ref 0–0.2)
PLATELET # BLD AUTO: 369 10*3/MM3 (ref 140–450)
PMV BLD AUTO: 10.2 FL (ref 6–12)
POTASSIUM SERPL-SCNC: 3.4 MMOL/L (ref 3.5–5.2)
PROT SERPL-MCNC: 7.1 G/DL (ref 6–8.5)
QT INTERVAL: 380 MS
QT INTERVAL: 382 MS
QTC INTERVAL: 446 MS
QTC INTERVAL: 477 MS
RBC # BLD AUTO: 4.79 10*6/MM3 (ref 3.77–5.28)
SODIUM SERPL-SCNC: 138 MMOL/L (ref 136–145)
TROPONIN T SERPL-MCNC: <0.01 NG/ML (ref 0–0.03)
WBC NRBC COR # BLD: 6.26 10*3/MM3 (ref 3.4–10.8)

## 2022-03-02 PROCEDURE — 84484 ASSAY OF TROPONIN QUANT: CPT | Performed by: NURSE PRACTITIONER

## 2022-03-02 PROCEDURE — 25010000002 METOCLOPRAMIDE PER 10 MG: Performed by: SURGERY

## 2022-03-02 PROCEDURE — 82962 GLUCOSE BLOOD TEST: CPT

## 2022-03-02 PROCEDURE — 0 POTASSIUM CHLORIDE 10 MEQ/100ML SOLUTION: Performed by: INTERNAL MEDICINE

## 2022-03-02 PROCEDURE — 93010 ELECTROCARDIOGRAM REPORT: CPT | Performed by: INTERNAL MEDICINE

## 2022-03-02 PROCEDURE — 25010000002 METHYLNALTREXONE 12 MG/0.6ML SOLUTION: Performed by: SURGERY

## 2022-03-02 PROCEDURE — 25010000002 ONDANSETRON PER 1 MG: Performed by: PHYSICIAN ASSISTANT

## 2022-03-02 PROCEDURE — 63710000001 ONDANSETRON PER 8 MG: Performed by: PHYSICIAN ASSISTANT

## 2022-03-02 PROCEDURE — 85014 HEMATOCRIT: CPT | Performed by: NURSE PRACTITIONER

## 2022-03-02 PROCEDURE — 85018 HEMOGLOBIN: CPT | Performed by: NURSE PRACTITIONER

## 2022-03-02 PROCEDURE — 25010000002 HYDROMORPHONE PER 4 MG: Performed by: NURSE PRACTITIONER

## 2022-03-02 PROCEDURE — 25010000002 HYDRALAZINE PER 20 MG: Performed by: NURSE PRACTITIONER

## 2022-03-02 PROCEDURE — 80053 COMPREHEN METABOLIC PANEL: CPT | Performed by: NURSE PRACTITIONER

## 2022-03-02 PROCEDURE — 74018 RADEX ABDOMEN 1 VIEW: CPT

## 2022-03-02 PROCEDURE — 99233 SBSQ HOSP IP/OBS HIGH 50: CPT | Performed by: NURSE PRACTITIONER

## 2022-03-02 PROCEDURE — 93005 ELECTROCARDIOGRAM TRACING: CPT | Performed by: INTERNAL MEDICINE

## 2022-03-02 PROCEDURE — 85025 COMPLETE CBC W/AUTO DIFF WBC: CPT | Performed by: NURSE PRACTITIONER

## 2022-03-02 RX ORDER — HYDROMORPHONE HYDROCHLORIDE 1 MG/ML
0.25 INJECTION, SOLUTION INTRAMUSCULAR; INTRAVENOUS; SUBCUTANEOUS
Status: DISCONTINUED | OUTPATIENT
Start: 2022-03-02 | End: 2022-03-04

## 2022-03-02 RX ORDER — BISACODYL 5 MG/1
10 TABLET, DELAYED RELEASE ORAL DAILY
Status: DISCONTINUED | OUTPATIENT
Start: 2022-03-02 | End: 2022-03-04 | Stop reason: HOSPADM

## 2022-03-02 RX ORDER — BISACODYL 5 MG/1
5 TABLET, DELAYED RELEASE ORAL DAILY PRN
Status: DISCONTINUED | OUTPATIENT
Start: 2022-03-02 | End: 2022-03-04 | Stop reason: HOSPADM

## 2022-03-02 RX ORDER — HYDRALAZINE HYDROCHLORIDE 20 MG/ML
5 INJECTION INTRAMUSCULAR; INTRAVENOUS EVERY 8 HOURS PRN
Status: DISCONTINUED | OUTPATIENT
Start: 2022-03-02 | End: 2022-03-03

## 2022-03-02 RX ORDER — POLYETHYLENE GLYCOL 3350 17 G/17G
17 POWDER, FOR SOLUTION ORAL DAILY PRN
Status: DISCONTINUED | OUTPATIENT
Start: 2022-03-02 | End: 2022-03-04 | Stop reason: HOSPADM

## 2022-03-02 RX ORDER — SODIUM CHLORIDE 9 MG/ML
75 INJECTION, SOLUTION INTRAVENOUS CONTINUOUS
Status: ACTIVE | OUTPATIENT
Start: 2022-03-02 | End: 2022-03-04

## 2022-03-02 RX ORDER — METOPROLOL TARTRATE 5 MG/5ML
5 INJECTION INTRAVENOUS ONCE
Status: COMPLETED | OUTPATIENT
Start: 2022-03-03 | End: 2022-03-02

## 2022-03-02 RX ORDER — METOCLOPRAMIDE HYDROCHLORIDE 5 MG/ML
10 INJECTION INTRAMUSCULAR; INTRAVENOUS EVERY 6 HOURS
Status: DISCONTINUED | OUTPATIENT
Start: 2022-03-02 | End: 2022-03-04 | Stop reason: HOSPADM

## 2022-03-02 RX ORDER — DOCUSATE SODIUM 100 MG/1
100 CAPSULE, LIQUID FILLED ORAL 2 TIMES DAILY
Status: DISCONTINUED | OUTPATIENT
Start: 2022-03-02 | End: 2022-03-04

## 2022-03-02 RX ORDER — PANTOPRAZOLE SODIUM 40 MG/10ML
40 INJECTION, POWDER, LYOPHILIZED, FOR SOLUTION INTRAVENOUS
Status: DISCONTINUED | OUTPATIENT
Start: 2022-03-02 | End: 2022-03-04 | Stop reason: HOSPADM

## 2022-03-02 RX ORDER — PROMETHAZINE HYDROCHLORIDE 12.5 MG/1
6.25 SUPPOSITORY RECTAL ONCE
Status: COMPLETED | OUTPATIENT
Start: 2022-03-02 | End: 2022-03-02

## 2022-03-02 RX ORDER — SIMETHICONE 80 MG
80 TABLET,CHEWABLE ORAL 4 TIMES DAILY PRN
Status: DISCONTINUED | OUTPATIENT
Start: 2022-03-02 | End: 2022-03-04 | Stop reason: HOSPADM

## 2022-03-02 RX ORDER — BISACODYL 10 MG
10 SUPPOSITORY, RECTAL RECTAL DAILY PRN
Status: DISCONTINUED | OUTPATIENT
Start: 2022-03-02 | End: 2022-03-04 | Stop reason: HOSPADM

## 2022-03-02 RX ORDER — AMOXICILLIN 250 MG
2 CAPSULE ORAL 2 TIMES DAILY
Status: DISCONTINUED | OUTPATIENT
Start: 2022-03-02 | End: 2022-03-04 | Stop reason: HOSPADM

## 2022-03-02 RX ORDER — PANTOPRAZOLE SODIUM 40 MG/10ML
40 INJECTION, POWDER, LYOPHILIZED, FOR SOLUTION INTRAVENOUS EVERY 12 HOURS SCHEDULED
Status: DISCONTINUED | OUTPATIENT
Start: 2022-03-02 | End: 2022-03-02

## 2022-03-02 RX ORDER — SUCRALFATE 1 G/1
1 TABLET ORAL
Status: DISCONTINUED | OUTPATIENT
Start: 2022-03-02 | End: 2022-03-04 | Stop reason: HOSPADM

## 2022-03-02 RX ORDER — METOPROLOL TARTRATE 5 MG/5ML
5 INJECTION INTRAVENOUS ONCE
Status: COMPLETED | OUTPATIENT
Start: 2022-03-02 | End: 2022-03-02

## 2022-03-02 RX ADMIN — SUCRALFATE 1 G: 1 TABLET ORAL at 17:51

## 2022-03-02 RX ADMIN — ONDANSETRON HYDROCHLORIDE 4 MG: 4 TABLET, FILM COATED ORAL at 03:13

## 2022-03-02 RX ADMIN — DOCUSATE SODIUM 100 MG: 100 CAPSULE, LIQUID FILLED ORAL at 20:28

## 2022-03-02 RX ADMIN — HYDROCODONE BITARTRATE AND ACETAMINOPHEN 1 TABLET: 5; 325 TABLET ORAL at 20:28

## 2022-03-02 RX ADMIN — SUCRALFATE 1 G: 1 TABLET ORAL at 20:28

## 2022-03-02 RX ADMIN — METOCLOPRAMIDE 10 MG: 5 INJECTION, SOLUTION INTRAMUSCULAR; INTRAVENOUS at 17:51

## 2022-03-02 RX ADMIN — Medication 5 MG: at 20:28

## 2022-03-02 RX ADMIN — PANTOPRAZOLE SODIUM 40 MG: 40 TABLET, DELAYED RELEASE ORAL at 07:58

## 2022-03-02 RX ADMIN — METOPROLOL TARTRATE 5 MG: 5 INJECTION INTRAVENOUS at 23:29

## 2022-03-02 RX ADMIN — SODIUM CHLORIDE 75 ML/HR: 9 INJECTION, SOLUTION INTRAVENOUS at 13:45

## 2022-03-02 RX ADMIN — METOPROLOL TARTRATE 5 MG: 5 INJECTION INTRAVENOUS at 13:58

## 2022-03-02 RX ADMIN — Medication 250 MG: at 20:28

## 2022-03-02 RX ADMIN — HYDROCODONE BITARTRATE AND ACETAMINOPHEN 1 TABLET: 5; 325 TABLET ORAL at 09:17

## 2022-03-02 RX ADMIN — HYDROCODONE BITARTRATE AND ACETAMINOPHEN 1 TABLET: 5; 325 TABLET ORAL at 05:04

## 2022-03-02 RX ADMIN — Medication 10 ML: at 20:29

## 2022-03-02 RX ADMIN — HYDRALAZINE HYDROCHLORIDE 5 MG: 20 INJECTION INTRAMUSCULAR; INTRAVENOUS at 15:30

## 2022-03-02 RX ADMIN — METOCLOPRAMIDE 10 MG: 5 INJECTION, SOLUTION INTRAMUSCULAR; INTRAVENOUS at 23:29

## 2022-03-02 RX ADMIN — HYDROMORPHONE HYDROCHLORIDE 0.25 MG: 1 INJECTION, SOLUTION INTRAMUSCULAR; INTRAVENOUS; SUBCUTANEOUS at 12:13

## 2022-03-02 RX ADMIN — ONDANSETRON 4 MG: 2 INJECTION INTRAMUSCULAR; INTRAVENOUS at 20:28

## 2022-03-02 RX ADMIN — METOPROLOL TARTRATE 25 MG: 25 TABLET, FILM COATED ORAL at 18:52

## 2022-03-02 RX ADMIN — POTASSIUM CHLORIDE 10 MEQ: 7.46 INJECTION, SOLUTION INTRAVENOUS at 22:20

## 2022-03-02 RX ADMIN — FERROUS SULFATE TAB 325 MG (65 MG ELEMENTAL FE) 325 MG: 325 (65 FE) TAB at 17:52

## 2022-03-02 RX ADMIN — METHYLNALTREXONE BROMIDE 4 MG: 12 INJECTION, SOLUTION SUBCUTANEOUS at 17:52

## 2022-03-02 RX ADMIN — POTASSIUM CHLORIDE 10 MEQ: 7.46 INJECTION, SOLUTION INTRAVENOUS at 23:17

## 2022-03-02 RX ADMIN — LIDOCAINE HYDROCHLORIDE: 20 SOLUTION ORAL; TOPICAL at 06:46

## 2022-03-02 RX ADMIN — POTASSIUM CHLORIDE 10 MEQ: 7.46 INJECTION, SOLUTION INTRAVENOUS at 20:28

## 2022-03-02 RX ADMIN — HYDROMORPHONE HYDROCHLORIDE 0.25 MG: 1 INJECTION, SOLUTION INTRAMUSCULAR; INTRAVENOUS; SUBCUTANEOUS at 18:28

## 2022-03-02 RX ADMIN — PROMETHAZINE HYDROCHLORIDE 6.25 MG: 12.5 SUPPOSITORY RECTAL at 05:48

## 2022-03-02 RX ADMIN — PANTOPRAZOLE SODIUM 40 MG: 40 INJECTION, POWDER, LYOPHILIZED, FOR SOLUTION INTRAVENOUS at 17:51

## 2022-03-02 RX ADMIN — ATORVASTATIN CALCIUM 80 MG: 40 TABLET, FILM COATED ORAL at 20:28

## 2022-03-02 RX ADMIN — BISACODYL 10 MG: 5 TABLET, COATED ORAL at 17:51

## 2022-03-02 RX ADMIN — SENNOSIDES AND DOCUSATE SODIUM 2 TABLET: 50; 8.6 TABLET ORAL at 20:28

## 2022-03-02 RX ADMIN — ONDANSETRON HYDROCHLORIDE 4 MG: 4 TABLET, FILM COATED ORAL at 09:17

## 2022-03-03 ENCOUNTER — APPOINTMENT (OUTPATIENT)
Dept: CT IMAGING | Facility: HOSPITAL | Age: 67
End: 2022-03-03

## 2022-03-03 LAB
ALBUMIN SERPL-MCNC: 4.1 G/DL (ref 3.5–5.2)
ALBUMIN/GLOB SERPL: 1.4 G/DL
ALP SERPL-CCNC: 288 U/L (ref 39–117)
ALT SERPL W P-5'-P-CCNC: 12 U/L (ref 1–33)
ANION GAP SERPL CALCULATED.3IONS-SCNC: 12 MMOL/L (ref 5–15)
AST SERPL-CCNC: 25 U/L (ref 1–32)
BASOPHILS # BLD AUTO: 0.02 10*3/MM3 (ref 0–0.2)
BASOPHILS NFR BLD AUTO: 0.2 % (ref 0–1.5)
BILIRUB SERPL-MCNC: 0.6 MG/DL (ref 0–1.2)
BUN SERPL-MCNC: 18 MG/DL (ref 8–23)
BUN/CREAT SERPL: 36 (ref 7–25)
CALCIUM SPEC-SCNC: 9.7 MG/DL (ref 8.6–10.5)
CHLORIDE SERPL-SCNC: 101 MMOL/L (ref 98–107)
CO2 SERPL-SCNC: 22 MMOL/L (ref 22–29)
CREAT SERPL-MCNC: 0.5 MG/DL (ref 0.57–1)
DEPRECATED RDW RBC AUTO: 49.3 FL (ref 37–54)
EGFRCR SERPLBLD CKD-EPI 2021: 104.9 ML/MIN/1.73
EOSINOPHIL # BLD AUTO: 0 10*3/MM3 (ref 0–0.4)
EOSINOPHIL NFR BLD AUTO: 0 % (ref 0.3–6.2)
ERYTHROCYTE [DISTWIDTH] IN BLOOD BY AUTOMATED COUNT: 14.6 % (ref 12.3–15.4)
GLOBULIN UR ELPH-MCNC: 2.9 GM/DL
GLUCOSE SERPL-MCNC: 110 MG/DL (ref 65–99)
HCT VFR BLD AUTO: 44.5 % (ref 34–46.6)
HGB BLD-MCNC: 14.4 G/DL (ref 12–15.9)
IMM GRANULOCYTES # BLD AUTO: 0.07 10*3/MM3 (ref 0–0.05)
IMM GRANULOCYTES NFR BLD AUTO: 0.8 % (ref 0–0.5)
LYMPHOCYTES # BLD AUTO: 0.48 10*3/MM3 (ref 0.7–3.1)
LYMPHOCYTES NFR BLD AUTO: 5.5 % (ref 19.6–45.3)
MCH RBC QN AUTO: 30 PG (ref 26.6–33)
MCHC RBC AUTO-ENTMCNC: 32.4 G/DL (ref 31.5–35.7)
MCV RBC AUTO: 92.7 FL (ref 79–97)
MONOCYTES # BLD AUTO: 0.77 10*3/MM3 (ref 0.1–0.9)
MONOCYTES NFR BLD AUTO: 8.8 % (ref 5–12)
NEUTROPHILS NFR BLD AUTO: 7.46 10*3/MM3 (ref 1.7–7)
NEUTROPHILS NFR BLD AUTO: 84.7 % (ref 42.7–76)
NRBC BLD AUTO-RTO: 0 /100 WBC (ref 0–0.2)
PLATELET # BLD AUTO: 285 10*3/MM3 (ref 140–450)
PMV BLD AUTO: 10 FL (ref 6–12)
POTASSIUM SERPL-SCNC: 5.1 MMOL/L (ref 3.5–5.2)
PROT SERPL-MCNC: 7 G/DL (ref 6–8.5)
RBC # BLD AUTO: 4.8 10*6/MM3 (ref 3.77–5.28)
SODIUM SERPL-SCNC: 135 MMOL/L (ref 136–145)
WBC NRBC COR # BLD: 8.8 10*3/MM3 (ref 3.4–10.8)

## 2022-03-03 PROCEDURE — 63710000001 PREDNISONE PER 5 MG: Performed by: NURSE PRACTITIONER

## 2022-03-03 PROCEDURE — 25010000002 HYDROMORPHONE PER 4 MG: Performed by: NURSE PRACTITIONER

## 2022-03-03 PROCEDURE — 80053 COMPREHEN METABOLIC PANEL: CPT | Performed by: NURSE PRACTITIONER

## 2022-03-03 PROCEDURE — 97110 THERAPEUTIC EXERCISES: CPT

## 2022-03-03 PROCEDURE — 74176 CT ABD & PELVIS W/O CONTRAST: CPT

## 2022-03-03 PROCEDURE — 25010000002 METOCLOPRAMIDE PER 10 MG: Performed by: SURGERY

## 2022-03-03 PROCEDURE — 0 POTASSIUM CHLORIDE 10 MEQ/100ML SOLUTION: Performed by: INTERNAL MEDICINE

## 2022-03-03 PROCEDURE — 99232 SBSQ HOSP IP/OBS MODERATE 35: CPT | Performed by: INTERNAL MEDICINE

## 2022-03-03 PROCEDURE — 85025 COMPLETE CBC W/AUTO DIFF WBC: CPT | Performed by: NURSE PRACTITIONER

## 2022-03-03 PROCEDURE — 63710000001 ONDANSETRON PER 8 MG: Performed by: PHYSICIAN ASSISTANT

## 2022-03-03 PROCEDURE — 97530 THERAPEUTIC ACTIVITIES: CPT

## 2022-03-03 RX ADMIN — POTASSIUM CHLORIDE 10 MEQ: 7.46 INJECTION, SOLUTION INTRAVENOUS at 00:51

## 2022-03-03 RX ADMIN — Medication 10 ML: at 08:49

## 2022-03-03 RX ADMIN — METOPROLOL TARTRATE 25 MG: 25 TABLET, FILM COATED ORAL at 08:50

## 2022-03-03 RX ADMIN — ATORVASTATIN CALCIUM 80 MG: 40 TABLET, FILM COATED ORAL at 20:36

## 2022-03-03 RX ADMIN — PREDNISONE 5 MG: 5 TABLET ORAL at 08:50

## 2022-03-03 RX ADMIN — Medication 10 ML: at 20:37

## 2022-03-03 RX ADMIN — FERROUS SULFATE TAB 325 MG (65 MG ELEMENTAL FE) 325 MG: 325 (65 FE) TAB at 16:51

## 2022-03-03 RX ADMIN — HYDROCODONE BITARTRATE AND ACETAMINOPHEN 1 TABLET: 5; 325 TABLET ORAL at 01:46

## 2022-03-03 RX ADMIN — METOCLOPRAMIDE 10 MG: 5 INJECTION, SOLUTION INTRAMUSCULAR; INTRAVENOUS at 23:23

## 2022-03-03 RX ADMIN — FERROUS SULFATE TAB 325 MG (65 MG ELEMENTAL FE) 325 MG: 325 (65 FE) TAB at 08:50

## 2022-03-03 RX ADMIN — PANTOPRAZOLE SODIUM 40 MG: 40 INJECTION, POWDER, LYOPHILIZED, FOR SOLUTION INTRAVENOUS at 06:42

## 2022-03-03 RX ADMIN — SENNOSIDES AND DOCUSATE SODIUM 2 TABLET: 50; 8.6 TABLET ORAL at 20:36

## 2022-03-03 RX ADMIN — PANTOPRAZOLE SODIUM 40 MG: 40 INJECTION, POWDER, LYOPHILIZED, FOR SOLUTION INTRAVENOUS at 16:51

## 2022-03-03 RX ADMIN — METOCLOPRAMIDE 10 MG: 5 INJECTION, SOLUTION INTRAMUSCULAR; INTRAVENOUS at 16:51

## 2022-03-03 RX ADMIN — Medication 250 MG: at 08:50

## 2022-03-03 RX ADMIN — HYDROCODONE BITARTRATE AND ACETAMINOPHEN 1 TABLET: 5; 325 TABLET ORAL at 13:15

## 2022-03-03 RX ADMIN — METOPROLOL TARTRATE 25 MG: 25 TABLET, FILM COATED ORAL at 12:31

## 2022-03-03 RX ADMIN — SODIUM CHLORIDE 75 ML/HR: 9 INJECTION, SOLUTION INTRAVENOUS at 06:43

## 2022-03-03 RX ADMIN — DOCUSATE SODIUM 100 MG: 100 CAPSULE, LIQUID FILLED ORAL at 20:37

## 2022-03-03 RX ADMIN — SUCRALFATE 1 G: 1 TABLET ORAL at 08:49

## 2022-03-03 RX ADMIN — SENNOSIDES AND DOCUSATE SODIUM 2 TABLET: 50; 8.6 TABLET ORAL at 08:50

## 2022-03-03 RX ADMIN — LIDOCAINE HYDROCHLORIDE: 20 SOLUTION ORAL; TOPICAL at 01:46

## 2022-03-03 RX ADMIN — SUCRALFATE 1 G: 1 TABLET ORAL at 12:31

## 2022-03-03 RX ADMIN — SUCRALFATE 1 G: 1 TABLET ORAL at 16:51

## 2022-03-03 RX ADMIN — HYDROCODONE BITARTRATE AND ACETAMINOPHEN 1 TABLET: 5; 325 TABLET ORAL at 16:51

## 2022-03-03 RX ADMIN — DOCUSATE SODIUM 100 MG: 100 CAPSULE, LIQUID FILLED ORAL at 08:49

## 2022-03-03 RX ADMIN — Medication 250 MG: at 20:37

## 2022-03-03 RX ADMIN — BISACODYL 10 MG: 5 TABLET, COATED ORAL at 08:49

## 2022-03-03 RX ADMIN — SUCRALFATE 1 G: 1 TABLET ORAL at 20:37

## 2022-03-03 RX ADMIN — METOCLOPRAMIDE 10 MG: 5 INJECTION, SOLUTION INTRAMUSCULAR; INTRAVENOUS at 04:42

## 2022-03-03 RX ADMIN — HYDROMORPHONE HYDROCHLORIDE 0.25 MG: 1 INJECTION, SOLUTION INTRAMUSCULAR; INTRAVENOUS; SUBCUTANEOUS at 20:36

## 2022-03-03 RX ADMIN — METOPROLOL TARTRATE 50 MG: 25 TABLET, FILM COATED ORAL at 20:37

## 2022-03-03 RX ADMIN — METOCLOPRAMIDE 10 MG: 5 INJECTION, SOLUTION INTRAMUSCULAR; INTRAVENOUS at 12:31

## 2022-03-03 RX ADMIN — Medication 5 MG: at 20:36

## 2022-03-03 RX ADMIN — HYDROMORPHONE HYDROCHLORIDE 0.25 MG: 1 INJECTION, SOLUTION INTRAMUSCULAR; INTRAVENOUS; SUBCUTANEOUS at 04:28

## 2022-03-03 RX ADMIN — ONDANSETRON HYDROCHLORIDE 4 MG: 4 TABLET, FILM COATED ORAL at 20:36

## 2022-03-04 VITALS
BODY MASS INDEX: 22.65 KG/M2 | SYSTOLIC BLOOD PRESSURE: 115 MMHG | TEMPERATURE: 98.1 F | OXYGEN SATURATION: 92 % | DIASTOLIC BLOOD PRESSURE: 88 MMHG | HEART RATE: 115 BPM | HEIGHT: 56 IN | WEIGHT: 100.7 LBS | RESPIRATION RATE: 18 BRPM

## 2022-03-04 LAB
ANION GAP SERPL CALCULATED.3IONS-SCNC: 13 MMOL/L (ref 5–15)
BUN SERPL-MCNC: 19 MG/DL (ref 8–23)
BUN/CREAT SERPL: 46.3 (ref 7–25)
CALCIUM SPEC-SCNC: 9.2 MG/DL (ref 8.6–10.5)
CHLORIDE SERPL-SCNC: 105 MMOL/L (ref 98–107)
CO2 SERPL-SCNC: 19 MMOL/L (ref 22–29)
CREAT SERPL-MCNC: 0.41 MG/DL (ref 0.57–1)
DEPRECATED RDW RBC AUTO: 51.3 FL (ref 37–54)
EGFRCR SERPLBLD CKD-EPI 2021: 110 ML/MIN/1.73
ERYTHROCYTE [DISTWIDTH] IN BLOOD BY AUTOMATED COUNT: 15.1 % (ref 12.3–15.4)
GLUCOSE SERPL-MCNC: 78 MG/DL (ref 65–99)
HCT VFR BLD AUTO: 41.7 % (ref 34–46.6)
HGB BLD-MCNC: 13.3 G/DL (ref 12–15.9)
MCH RBC QN AUTO: 29.6 PG (ref 26.6–33)
MCHC RBC AUTO-ENTMCNC: 31.9 G/DL (ref 31.5–35.7)
MCV RBC AUTO: 92.7 FL (ref 79–97)
PLATELET # BLD AUTO: 235 10*3/MM3 (ref 140–450)
PMV BLD AUTO: 10.2 FL (ref 6–12)
POTASSIUM SERPL-SCNC: 3.7 MMOL/L (ref 3.5–5.2)
RBC # BLD AUTO: 4.5 10*6/MM3 (ref 3.77–5.28)
SODIUM SERPL-SCNC: 137 MMOL/L (ref 136–145)
WBC NRBC COR # BLD: 5.4 10*3/MM3 (ref 3.4–10.8)

## 2022-03-04 PROCEDURE — 25010000002 METHYLNALTREXONE 12 MG/0.6ML SOLUTION: Performed by: SURGERY

## 2022-03-04 PROCEDURE — 85027 COMPLETE CBC AUTOMATED: CPT | Performed by: SURGERY

## 2022-03-04 PROCEDURE — 25010000002 METOCLOPRAMIDE PER 10 MG: Performed by: SURGERY

## 2022-03-04 PROCEDURE — 80048 BASIC METABOLIC PNL TOTAL CA: CPT | Performed by: SURGERY

## 2022-03-04 PROCEDURE — 99239 HOSP IP/OBS DSCHRG MGMT >30: CPT | Performed by: NURSE PRACTITIONER

## 2022-03-04 PROCEDURE — 63710000001 PREDNISONE PER 5 MG: Performed by: NURSE PRACTITIONER

## 2022-03-04 RX ORDER — PANTOPRAZOLE SODIUM 40 MG/1
40 TABLET, DELAYED RELEASE ORAL 2 TIMES DAILY
Start: 2022-03-04

## 2022-03-04 RX ORDER — BISACODYL 10 MG
10 SUPPOSITORY, RECTAL RECTAL ONCE
Status: COMPLETED | OUTPATIENT
Start: 2022-03-04 | End: 2022-03-04

## 2022-03-04 RX ORDER — BISACODYL 5 MG/1
5 TABLET, DELAYED RELEASE ORAL DAILY PRN
Start: 2022-03-04 | End: 2022-03-11 | Stop reason: HOSPADM

## 2022-03-04 RX ORDER — POLYETHYLENE GLYCOL 3350 17 G/17G
17 POWDER, FOR SOLUTION ORAL DAILY PRN
Start: 2022-03-04

## 2022-03-04 RX ORDER — MINERAL OIL 100 G/100G
1 OIL RECTAL ONCE AS NEEDED
Status: DISCONTINUED | OUTPATIENT
Start: 2022-03-04 | End: 2022-03-04 | Stop reason: HOSPADM

## 2022-03-04 RX ORDER — AMOXICILLIN 250 MG
2 CAPSULE ORAL 2 TIMES DAILY
Start: 2022-03-04

## 2022-03-04 RX ORDER — SUCRALFATE 1 G/1
1 TABLET ORAL
Start: 2022-03-04

## 2022-03-04 RX ORDER — METOPROLOL TARTRATE 50 MG/1
50 TABLET, FILM COATED ORAL EVERY 12 HOURS SCHEDULED
Start: 2022-03-04

## 2022-03-04 RX ORDER — HYDROCODONE BITARTRATE AND ACETAMINOPHEN 5; 325 MG/1; MG/1
1 TABLET ORAL EVERY 4 HOURS PRN
Start: 2022-03-04 | End: 2022-03-11 | Stop reason: HOSPADM

## 2022-03-04 RX ORDER — BISACODYL 10 MG
10 SUPPOSITORY, RECTAL RECTAL DAILY
Start: 2022-03-04

## 2022-03-04 RX ADMIN — SUCRALFATE 1 G: 1 TABLET ORAL at 06:15

## 2022-03-04 RX ADMIN — BISACODYL 10 MG: 10 SUPPOSITORY RECTAL at 09:00

## 2022-03-04 RX ADMIN — PREDNISONE 5 MG: 5 TABLET ORAL at 09:02

## 2022-03-04 RX ADMIN — SODIUM CHLORIDE 75 ML/HR: 9 INJECTION, SOLUTION INTRAVENOUS at 06:25

## 2022-03-04 RX ADMIN — FERROUS SULFATE TAB 325 MG (65 MG ELEMENTAL FE) 325 MG: 325 (65 FE) TAB at 09:00

## 2022-03-04 RX ADMIN — METOPROLOL TARTRATE 50 MG: 25 TABLET, FILM COATED ORAL at 09:01

## 2022-03-04 RX ADMIN — METHYLNALTREXONE BROMIDE 4 MG: 12 INJECTION, SOLUTION SUBCUTANEOUS at 09:02

## 2022-03-04 RX ADMIN — Medication 250 MG: at 09:00

## 2022-03-04 RX ADMIN — METOCLOPRAMIDE 10 MG: 5 INJECTION, SOLUTION INTRAMUSCULAR; INTRAVENOUS at 06:15

## 2022-03-04 RX ADMIN — HYDROCODONE BITARTRATE AND ACETAMINOPHEN 1 TABLET: 5; 325 TABLET ORAL at 01:45

## 2022-03-04 RX ADMIN — HYDROCODONE BITARTRATE AND ACETAMINOPHEN 1 TABLET: 5; 325 TABLET ORAL at 06:15

## 2022-03-04 RX ADMIN — SENNOSIDES AND DOCUSATE SODIUM 2 TABLET: 50; 8.6 TABLET ORAL at 09:00

## 2022-03-04 RX ADMIN — PANTOPRAZOLE SODIUM 40 MG: 40 INJECTION, POWDER, LYOPHILIZED, FOR SOLUTION INTRAVENOUS at 06:15

## 2022-03-05 ENCOUNTER — APPOINTMENT (OUTPATIENT)
Dept: CT IMAGING | Facility: HOSPITAL | Age: 67
End: 2022-03-05

## 2022-03-05 ENCOUNTER — APPOINTMENT (OUTPATIENT)
Dept: GENERAL RADIOLOGY | Facility: HOSPITAL | Age: 67
End: 2022-03-05

## 2022-03-05 ENCOUNTER — HOSPITAL ENCOUNTER (INPATIENT)
Facility: HOSPITAL | Age: 67
LOS: 6 days | Discharge: NURSING, REHAB OR LONG-TERM CARE | End: 2022-03-11
Attending: EMERGENCY MEDICINE | Admitting: HOSPITALIST

## 2022-03-05 DIAGNOSIS — I95.9 HYPOTENSION, UNSPECIFIED HYPOTENSION TYPE: ICD-10-CM

## 2022-03-05 DIAGNOSIS — J18.9 MULTIFOCAL PNEUMONIA: ICD-10-CM

## 2022-03-05 DIAGNOSIS — G89.29 OTHER CHRONIC PAIN: Primary | Chronic | ICD-10-CM

## 2022-03-05 DIAGNOSIS — R13.10 DYSPHAGIA, UNSPECIFIED TYPE: ICD-10-CM

## 2022-03-05 DIAGNOSIS — R09.02 HYPOXEMIA: ICD-10-CM

## 2022-03-05 DIAGNOSIS — A41.9 SEPSIS, DUE TO UNSPECIFIED ORGANISM, UNSPECIFIED WHETHER ACUTE ORGAN DYSFUNCTION PRESENT: ICD-10-CM

## 2022-03-05 DIAGNOSIS — I48.91 ATRIAL FIBRILLATION WITH RVR: ICD-10-CM

## 2022-03-05 PROBLEM — R74.8 ELEVATED ALKALINE PHOSPHATASE LEVEL: Status: ACTIVE | Noted: 2022-03-05

## 2022-03-05 PROBLEM — R79.9 ELEVATED BUN: Status: ACTIVE | Noted: 2022-03-05

## 2022-03-05 PROBLEM — R79.89 ELEVATED LACTIC ACID LEVEL: Status: ACTIVE | Noted: 2022-03-05

## 2022-03-05 PROBLEM — R17 SERUM TOTAL BILIRUBIN ELEVATED: Status: ACTIVE | Noted: 2022-03-05

## 2022-03-05 PROBLEM — E86.0 DEHYDRATION: Status: ACTIVE | Noted: 2022-03-05

## 2022-03-05 PROBLEM — D72.819 LEUKOPENIA: Status: ACTIVE | Noted: 2022-03-05

## 2022-03-05 PROBLEM — J69.0 ASPIRATION PNEUMONIA (HCC): Status: ACTIVE | Noted: 2022-03-05

## 2022-03-05 PROBLEM — J96.01 ACUTE RESPIRATORY FAILURE WITH HYPOXIA: Status: ACTIVE | Noted: 2022-03-05

## 2022-03-05 PROBLEM — R79.89 ELEVATED BRAIN NATRIURETIC PEPTIDE (BNP) LEVEL: Status: ACTIVE | Noted: 2022-03-05

## 2022-03-05 PROBLEM — R65.20 SEVERE SEPSIS (HCC): Status: ACTIVE | Noted: 2021-11-21

## 2022-03-05 LAB
ALBUMIN SERPL-MCNC: 3.5 G/DL (ref 3.5–5.2)
ALBUMIN/GLOB SERPL: 1.2 G/DL
ALP SERPL-CCNC: 245 U/L (ref 39–117)
ALT SERPL W P-5'-P-CCNC: 8 U/L (ref 1–33)
AMORPH URATE CRY URNS QL MICRO: ABNORMAL /HPF
ANION GAP SERPL CALCULATED.3IONS-SCNC: 16 MMOL/L (ref 5–15)
ARTERIAL PATENCY WRIST A: ABNORMAL
AST SERPL-CCNC: 19 U/L (ref 1–32)
ATMOSPHERIC PRESS: ABNORMAL MM[HG]
BACTERIA UR QL AUTO: ABNORMAL /HPF
BASE EXCESS BLDA CALC-SCNC: -1.6 MMOL/L (ref 0–2)
BASOPHILS # BLD AUTO: 0.01 10*3/MM3 (ref 0–0.2)
BASOPHILS NFR BLD AUTO: 0.5 % (ref 0–1.5)
BDY SITE: ABNORMAL
BILIRUB SERPL-MCNC: 2 MG/DL (ref 0–1.2)
BILIRUB UR QL STRIP: NEGATIVE
BODY TEMPERATURE: 37 C
BUN SERPL-MCNC: 36 MG/DL (ref 8–23)
BUN/CREAT SERPL: 37.1 (ref 7–25)
BURR CELLS BLD QL SMEAR: NORMAL
CALCIUM SPEC-SCNC: 9.7 MG/DL (ref 8.6–10.5)
CHLORIDE SERPL-SCNC: 98 MMOL/L (ref 98–107)
CLARITY UR: ABNORMAL
CO2 BLDA-SCNC: 22.6 MMOL/L (ref 22–33)
CO2 SERPL-SCNC: 21 MMOL/L (ref 22–29)
COHGB MFR BLD: 0.8 % (ref 0–2)
COLOR UR: ABNORMAL
CREAT SERPL-MCNC: 0.97 MG/DL (ref 0.57–1)
D-LACTATE SERPL-SCNC: 2.1 MMOL/L (ref 0.5–2)
DACRYOCYTES BLD QL SMEAR: NORMAL
DEPRECATED RDW RBC AUTO: 49.4 FL (ref 37–54)
EGFRCR SERPLBLD CKD-EPI 2021: 65.4 ML/MIN/1.73
EOSINOPHIL # BLD AUTO: 0.02 10*3/MM3 (ref 0–0.4)
EOSINOPHIL NFR BLD AUTO: 1.1 % (ref 0.3–6.2)
EPAP: 0
ERYTHROCYTE [DISTWIDTH] IN BLOOD BY AUTOMATED COUNT: 15.3 % (ref 12.3–15.4)
FLUAV SUBTYP SPEC NAA+PROBE: NOT DETECTED
FLUBV RNA ISLT QL NAA+PROBE: NOT DETECTED
GLOBULIN UR ELPH-MCNC: 3 GM/DL
GLUCOSE SERPL-MCNC: 72 MG/DL (ref 65–99)
GLUCOSE UR STRIP-MCNC: NEGATIVE MG/DL
HCO3 BLDA-SCNC: 21.6 MMOL/L (ref 20–26)
HCT VFR BLD AUTO: 39.6 % (ref 34–46.6)
HCT VFR BLD CALC: 39.1 % (ref 38–51)
HGB BLD-MCNC: 13.2 G/DL (ref 12–15.9)
HGB BLDA-MCNC: 12.8 G/DL (ref 14–18)
HGB UR QL STRIP.AUTO: ABNORMAL
HOLD SPECIMEN: NORMAL
HYALINE CASTS UR QL AUTO: ABNORMAL /LPF
IMM GRANULOCYTES # BLD AUTO: 0.01 10*3/MM3 (ref 0–0.05)
IMM GRANULOCYTES NFR BLD AUTO: 0.5 % (ref 0–0.5)
INHALED O2 CONCENTRATION: 80 %
IPAP: 0
KETONES UR QL STRIP: ABNORMAL
LEUKOCYTE ESTERASE UR QL STRIP.AUTO: ABNORMAL
LYMPHOCYTES # BLD AUTO: 0.12 10*3/MM3 (ref 0.7–3.1)
LYMPHOCYTES NFR BLD AUTO: 6.4 % (ref 19.6–45.3)
MCH RBC QN AUTO: 29.7 PG (ref 26.6–33)
MCHC RBC AUTO-ENTMCNC: 33.3 G/DL (ref 31.5–35.7)
MCV RBC AUTO: 89.2 FL (ref 79–97)
METHGB BLD QL: 0.4 % (ref 0–1.5)
MODALITY: ABNORMAL
MONOCYTES # BLD AUTO: 0.18 10*3/MM3 (ref 0.1–0.9)
MONOCYTES NFR BLD AUTO: 9.6 % (ref 5–12)
NEUTROPHILS NFR BLD AUTO: 1.54 10*3/MM3 (ref 1.7–7)
NEUTROPHILS NFR BLD AUTO: 81.9 % (ref 42.7–76)
NITRITE UR QL STRIP: NEGATIVE
NOTE: ABNORMAL
NRBC BLD AUTO-RTO: 0 /100 WBC (ref 0–0.2)
NT-PROBNP SERPL-MCNC: 2577 PG/ML (ref 0–900)
OXYHGB MFR BLDV: 96.7 % (ref 94–99)
PAW @ PEAK INSP FLOW SETTING VENT: 0 CMH2O
PCO2 BLDA: 31.1 MM HG (ref 35–45)
PCO2 TEMP ADJ BLD: 31.1 MM HG (ref 35–45)
PH BLDA: 7.45 PH UNITS (ref 7.35–7.45)
PH UR STRIP.AUTO: <=5 [PH] (ref 5–8)
PH, TEMP CORRECTED: 7.45 PH UNITS
PLAT MORPH BLD: NORMAL
PLATELET # BLD AUTO: 264 10*3/MM3 (ref 140–450)
PMV BLD AUTO: 10.1 FL (ref 6–12)
PO2 BLDA: 93.9 MM HG (ref 83–108)
PO2 TEMP ADJ BLD: 93.9 MM HG (ref 83–108)
POTASSIUM SERPL-SCNC: 3.6 MMOL/L (ref 3.5–5.2)
PROCALCITONIN SERPL-MCNC: 1.88 NG/ML (ref 0–0.25)
PROT SERPL-MCNC: 6.5 G/DL (ref 6–8.5)
PROT UR QL STRIP: ABNORMAL
QT INTERVAL: 326 MS
QTC INTERVAL: 456 MS
RBC # BLD AUTO: 4.44 10*6/MM3 (ref 3.77–5.28)
RBC # UR STRIP: ABNORMAL /HPF
REF LAB TEST METHOD: ABNORMAL
SARS-COV-2 RNA PNL SPEC NAA+PROBE: NOT DETECTED
SODIUM SERPL-SCNC: 135 MMOL/L (ref 136–145)
SP GR UR STRIP: 1.06 (ref 1–1.03)
SQUAMOUS #/AREA URNS HPF: ABNORMAL /HPF
TOTAL RATE: 0 BREATHS/MINUTE
TRANS CELLS #/AREA URNS HPF: ABNORMAL /HPF
TROPONIN T SERPL-MCNC: 0.02 NG/ML (ref 0–0.03)
UROBILINOGEN UR QL STRIP: ABNORMAL
WBC # UR STRIP: ABNORMAL /HPF
WBC MORPH BLD: NORMAL
WBC NRBC COR # BLD: 1.88 10*3/MM3 (ref 3.4–10.8)
WHOLE BLOOD HOLD SPECIMEN: NORMAL
WHOLE BLOOD HOLD SPECIMEN: NORMAL
YEAST URNS QL MICRO: ABNORMAL /HPF

## 2022-03-05 PROCEDURE — 81001 URINALYSIS AUTO W/SCOPE: CPT | Performed by: PHYSICIAN ASSISTANT

## 2022-03-05 PROCEDURE — 71045 X-RAY EXAM CHEST 1 VIEW: CPT

## 2022-03-05 PROCEDURE — 25010000002 DIGOXIN PER 500 MCG: Performed by: PHYSICIAN ASSISTANT

## 2022-03-05 PROCEDURE — 83880 ASSAY OF NATRIURETIC PEPTIDE: CPT | Performed by: EMERGENCY MEDICINE

## 2022-03-05 PROCEDURE — 84484 ASSAY OF TROPONIN QUANT: CPT | Performed by: EMERGENCY MEDICINE

## 2022-03-05 PROCEDURE — 99223 1ST HOSP IP/OBS HIGH 75: CPT | Performed by: INTERNAL MEDICINE

## 2022-03-05 PROCEDURE — 85007 BL SMEAR W/DIFF WBC COUNT: CPT | Performed by: EMERGENCY MEDICINE

## 2022-03-05 PROCEDURE — 99291 CRITICAL CARE FIRST HOUR: CPT

## 2022-03-05 PROCEDURE — P9612 CATHETERIZE FOR URINE SPEC: HCPCS

## 2022-03-05 PROCEDURE — 87077 CULTURE AEROBIC IDENTIFY: CPT | Performed by: PHYSICIAN ASSISTANT

## 2022-03-05 PROCEDURE — 80053 COMPREHEN METABOLIC PANEL: CPT | Performed by: EMERGENCY MEDICINE

## 2022-03-05 PROCEDURE — 83605 ASSAY OF LACTIC ACID: CPT | Performed by: PHYSICIAN ASSISTANT

## 2022-03-05 PROCEDURE — 83050 HGB METHEMOGLOBIN QUAN: CPT

## 2022-03-05 PROCEDURE — 36600 WITHDRAWAL OF ARTERIAL BLOOD: CPT

## 2022-03-05 PROCEDURE — 87086 URINE CULTURE/COLONY COUNT: CPT | Performed by: INTERNAL MEDICINE

## 2022-03-05 PROCEDURE — 85025 COMPLETE CBC W/AUTO DIFF WBC: CPT | Performed by: EMERGENCY MEDICINE

## 2022-03-05 PROCEDURE — 87186 SC STD MICRODIL/AGAR DIL: CPT | Performed by: PHYSICIAN ASSISTANT

## 2022-03-05 PROCEDURE — 84145 PROCALCITONIN (PCT): CPT | Performed by: PHYSICIAN ASSISTANT

## 2022-03-05 PROCEDURE — 25010000002 HYDROCORTISONE SODIUM SUCCINATE 100 MG RECONSTITUTED SOLUTION: Performed by: NURSE PRACTITIONER

## 2022-03-05 PROCEDURE — 71275 CT ANGIOGRAPHY CHEST: CPT

## 2022-03-05 PROCEDURE — 93005 ELECTROCARDIOGRAM TRACING: CPT | Performed by: EMERGENCY MEDICINE

## 2022-03-05 PROCEDURE — 82375 ASSAY CARBOXYHB QUANT: CPT

## 2022-03-05 PROCEDURE — 25010000002 PIPERACILLIN SOD-TAZOBACTAM PER 1 G: Performed by: PHYSICIAN ASSISTANT

## 2022-03-05 PROCEDURE — 87636 SARSCOV2 & INF A&B AMP PRB: CPT | Performed by: PHYSICIAN ASSISTANT

## 2022-03-05 PROCEDURE — 36415 COLL VENOUS BLD VENIPUNCTURE: CPT

## 2022-03-05 PROCEDURE — 87040 BLOOD CULTURE FOR BACTERIA: CPT | Performed by: PHYSICIAN ASSISTANT

## 2022-03-05 PROCEDURE — 25010000002 VANCOMYCIN PER 500 MG: Performed by: PHYSICIAN ASSISTANT

## 2022-03-05 PROCEDURE — 87150 DNA/RNA AMPLIFIED PROBE: CPT | Performed by: PHYSICIAN ASSISTANT

## 2022-03-05 PROCEDURE — 82805 BLOOD GASES W/O2 SATURATION: CPT

## 2022-03-05 PROCEDURE — 0 IOPAMIDOL PER 1 ML: Performed by: EMERGENCY MEDICINE

## 2022-03-05 RX ORDER — POLYETHYLENE GLYCOL 3350 17 G/17G
17 POWDER, FOR SOLUTION ORAL DAILY PRN
Status: DISCONTINUED | OUTPATIENT
Start: 2022-03-05 | End: 2022-03-11 | Stop reason: HOSPADM

## 2022-03-05 RX ORDER — SODIUM CHLORIDE 0.9 % (FLUSH) 0.9 %
10 SYRINGE (ML) INJECTION EVERY 12 HOURS SCHEDULED
Status: DISCONTINUED | OUTPATIENT
Start: 2022-03-05 | End: 2022-03-11 | Stop reason: HOSPADM

## 2022-03-05 RX ORDER — PANTOPRAZOLE SODIUM 40 MG/1
40 TABLET, DELAYED RELEASE ORAL 2 TIMES DAILY
Status: DISCONTINUED | OUTPATIENT
Start: 2022-03-05 | End: 2022-03-06 | Stop reason: ALTCHOICE

## 2022-03-05 RX ORDER — AMOXICILLIN 250 MG
2 CAPSULE ORAL 2 TIMES DAILY
Status: DISCONTINUED | OUTPATIENT
Start: 2022-03-05 | End: 2022-03-07

## 2022-03-05 RX ORDER — SODIUM CHLORIDE 0.9 % (FLUSH) 0.9 %
10 SYRINGE (ML) INJECTION AS NEEDED
Status: DISCONTINUED | OUTPATIENT
Start: 2022-03-05 | End: 2022-03-06

## 2022-03-05 RX ORDER — DIGOXIN 0.25 MG/ML
250 INJECTION INTRAMUSCULAR; INTRAVENOUS ONCE
Status: COMPLETED | OUTPATIENT
Start: 2022-03-05 | End: 2022-03-05

## 2022-03-05 RX ORDER — SUCRALFATE 1 G/1
1 TABLET ORAL
Status: DISCONTINUED | OUTPATIENT
Start: 2022-03-05 | End: 2022-03-11 | Stop reason: HOSPADM

## 2022-03-05 RX ORDER — DEXTROSE AND SODIUM CHLORIDE 5; .9 G/100ML; G/100ML
100 INJECTION, SOLUTION INTRAVENOUS CONTINUOUS
Status: DISCONTINUED | OUTPATIENT
Start: 2022-03-05 | End: 2022-03-08

## 2022-03-05 RX ORDER — BISACODYL 5 MG/1
5 TABLET, DELAYED RELEASE ORAL DAILY PRN
Status: DISCONTINUED | OUTPATIENT
Start: 2022-03-05 | End: 2022-03-11 | Stop reason: HOSPADM

## 2022-03-05 RX ORDER — BISACODYL 10 MG
10 SUPPOSITORY, RECTAL RECTAL DAILY
Status: DISCONTINUED | OUTPATIENT
Start: 2022-03-06 | End: 2022-03-08

## 2022-03-05 RX ORDER — SODIUM CHLORIDE 0.9 % (FLUSH) 0.9 %
10 SYRINGE (ML) INJECTION AS NEEDED
Status: DISCONTINUED | OUTPATIENT
Start: 2022-03-05 | End: 2022-03-11 | Stop reason: HOSPADM

## 2022-03-05 RX ADMIN — IOPAMIDOL 80 ML: 755 INJECTION, SOLUTION INTRAVENOUS at 20:44

## 2022-03-05 RX ADMIN — Medication 10 ML: at 23:56

## 2022-03-05 RX ADMIN — VANCOMYCIN HYDROCHLORIDE 750 MG: 750 INJECTION, SOLUTION INTRAVENOUS at 22:05

## 2022-03-05 RX ADMIN — DIGOXIN 250 MCG: 0.25 INJECTION INTRAMUSCULAR; INTRAVENOUS at 22:05

## 2022-03-05 RX ADMIN — SODIUM CHLORIDE 1278 ML: 9 INJECTION, SOLUTION INTRAVENOUS at 20:20

## 2022-03-05 RX ADMIN — SODIUM CHLORIDE 1000 ML: 9 INJECTION, SOLUTION INTRAVENOUS at 23:56

## 2022-03-05 RX ADMIN — HYDROCORTISONE SODIUM SUCCINATE 50 MG: 100 INJECTION, POWDER, FOR SOLUTION INTRAMUSCULAR; INTRAVENOUS at 23:56

## 2022-03-05 RX ADMIN — TAZOBACTAM SODIUM AND PIPERACILLIN SODIUM 3.38 G: 375; 3 INJECTION, SOLUTION INTRAVENOUS at 21:46

## 2022-03-06 ENCOUNTER — APPOINTMENT (OUTPATIENT)
Dept: GENERAL RADIOLOGY | Facility: HOSPITAL | Age: 67
End: 2022-03-06

## 2022-03-06 LAB
ANION GAP SERPL CALCULATED.3IONS-SCNC: 12 MMOL/L (ref 5–15)
BACTERIA BLD CULT: ABNORMAL
BASOPHILS # BLD AUTO: 0.02 10*3/MM3 (ref 0–0.2)
BASOPHILS NFR BLD AUTO: 0.4 % (ref 0–1.5)
BUN SERPL-MCNC: 31 MG/DL (ref 8–23)
BUN/CREAT SERPL: 63.3 (ref 7–25)
CALCIUM SPEC-SCNC: 8.6 MG/DL (ref 8.6–10.5)
CHLORIDE SERPL-SCNC: 114 MMOL/L (ref 98–107)
CO2 SERPL-SCNC: 21 MMOL/L (ref 22–29)
CREAT SERPL-MCNC: 0.49 MG/DL (ref 0.57–1)
D-LACTATE SERPL-SCNC: 1.7 MMOL/L (ref 0.5–2)
DEPRECATED RDW RBC AUTO: 50.3 FL (ref 37–54)
EGFRCR SERPLBLD CKD-EPI 2021: 105.4 ML/MIN/1.73
EOSINOPHIL # BLD AUTO: 0 10*3/MM3 (ref 0–0.4)
EOSINOPHIL NFR BLD AUTO: 0 % (ref 0.3–6.2)
ERYTHROCYTE [DISTWIDTH] IN BLOOD BY AUTOMATED COUNT: 15.4 % (ref 12.3–15.4)
GLUCOSE BLDC GLUCOMTR-MCNC: 115 MG/DL (ref 70–130)
GLUCOSE BLDC GLUCOMTR-MCNC: 79 MG/DL (ref 70–130)
GLUCOSE SERPL-MCNC: 129 MG/DL (ref 65–99)
HCT VFR BLD AUTO: 35.8 % (ref 34–46.6)
HGB BLD-MCNC: 11.8 G/DL (ref 12–15.9)
IMM GRANULOCYTES # BLD AUTO: 0.02 10*3/MM3 (ref 0–0.05)
IMM GRANULOCYTES NFR BLD AUTO: 0.4 % (ref 0–0.5)
LYMPHOCYTES # BLD AUTO: 0.14 10*3/MM3 (ref 0.7–3.1)
LYMPHOCYTES NFR BLD AUTO: 2.9 % (ref 19.6–45.3)
MCH RBC QN AUTO: 29.6 PG (ref 26.6–33)
MCHC RBC AUTO-ENTMCNC: 33 G/DL (ref 31.5–35.7)
MCV RBC AUTO: 89.7 FL (ref 79–97)
MONOCYTES # BLD AUTO: 0.15 10*3/MM3 (ref 0.1–0.9)
MONOCYTES NFR BLD AUTO: 3.1 % (ref 5–12)
NEUTROPHILS NFR BLD AUTO: 4.5 10*3/MM3 (ref 1.7–7)
NEUTROPHILS NFR BLD AUTO: 93.2 % (ref 42.7–76)
NRBC BLD AUTO-RTO: 0 /100 WBC (ref 0–0.2)
PLAT MORPH BLD: NORMAL
PLATELET # BLD AUTO: 232 10*3/MM3 (ref 140–450)
PMV BLD AUTO: 10.1 FL (ref 6–12)
POTASSIUM SERPL-SCNC: 3.5 MMOL/L (ref 3.5–5.2)
RBC # BLD AUTO: 3.99 10*6/MM3 (ref 3.77–5.28)
RBC MORPH BLD: NORMAL
SODIUM SERPL-SCNC: 147 MMOL/L (ref 136–145)
WBC MORPH BLD: NORMAL
WBC NRBC COR # BLD: 4.83 10*3/MM3 (ref 3.4–10.8)

## 2022-03-06 PROCEDURE — 83605 ASSAY OF LACTIC ACID: CPT | Performed by: PHYSICIAN ASSISTANT

## 2022-03-06 PROCEDURE — 82962 GLUCOSE BLOOD TEST: CPT

## 2022-03-06 PROCEDURE — 25010000002 HYDROCORTISONE SODIUM SUCCINATE 100 MG RECONSTITUTED SOLUTION: Performed by: NURSE PRACTITIONER

## 2022-03-06 PROCEDURE — 25010000002 VANCOMYCIN PER 500 MG

## 2022-03-06 PROCEDURE — 02HV33Z INSERTION OF INFUSION DEVICE INTO SUPERIOR VENA CAVA, PERCUTANEOUS APPROACH: ICD-10-PCS | Performed by: INTERNAL MEDICINE

## 2022-03-06 PROCEDURE — 71045 X-RAY EXAM CHEST 1 VIEW: CPT

## 2022-03-06 PROCEDURE — 99291 CRITICAL CARE FIRST HOUR: CPT | Performed by: INTERNAL MEDICINE

## 2022-03-06 PROCEDURE — C1894 INTRO/SHEATH, NON-LASER: HCPCS

## 2022-03-06 PROCEDURE — 85007 BL SMEAR W/DIFF WBC COUNT: CPT | Performed by: INTERNAL MEDICINE

## 2022-03-06 PROCEDURE — 25010000002 ENOXAPARIN PER 10 MG

## 2022-03-06 PROCEDURE — 85025 COMPLETE CBC W/AUTO DIFF WBC: CPT | Performed by: INTERNAL MEDICINE

## 2022-03-06 PROCEDURE — 74018 RADEX ABDOMEN 1 VIEW: CPT

## 2022-03-06 PROCEDURE — 25010000002 PIPERACILLIN SOD-TAZOBACTAM PER 1 G: Performed by: NURSE PRACTITIONER

## 2022-03-06 PROCEDURE — C1751 CATH, INF, PER/CENT/MIDLINE: HCPCS

## 2022-03-06 PROCEDURE — 94799 UNLISTED PULMONARY SVC/PX: CPT

## 2022-03-06 PROCEDURE — 80048 BASIC METABOLIC PNL TOTAL CA: CPT | Performed by: INTERNAL MEDICINE

## 2022-03-06 RX ORDER — METOCLOPRAMIDE HYDROCHLORIDE 5 MG/ML
5 INJECTION INTRAMUSCULAR; INTRAVENOUS EVERY 6 HOURS PRN
Status: DISCONTINUED | OUTPATIENT
Start: 2022-03-06 | End: 2022-03-11 | Stop reason: HOSPADM

## 2022-03-06 RX ORDER — PANTOPRAZOLE SODIUM 40 MG/10ML
40 INJECTION, POWDER, LYOPHILIZED, FOR SOLUTION INTRAVENOUS EVERY 12 HOURS SCHEDULED
Status: DISCONTINUED | OUTPATIENT
Start: 2022-03-06 | End: 2022-03-08

## 2022-03-06 RX ORDER — SODIUM CHLORIDE 0.9 % (FLUSH) 0.9 %
10 SYRINGE (ML) INJECTION AS NEEDED
Status: DISCONTINUED | OUTPATIENT
Start: 2022-03-06 | End: 2022-03-11 | Stop reason: HOSPADM

## 2022-03-06 RX ORDER — SODIUM CHLORIDE 0.9 % (FLUSH) 0.9 %
10 SYRINGE (ML) INJECTION EVERY 12 HOURS SCHEDULED
Status: DISCONTINUED | OUTPATIENT
Start: 2022-03-06 | End: 2022-03-11 | Stop reason: HOSPADM

## 2022-03-06 RX ORDER — SODIUM CHLORIDE 0.9 % (FLUSH) 0.9 %
20 SYRINGE (ML) INJECTION AS NEEDED
Status: DISCONTINUED | OUTPATIENT
Start: 2022-03-06 | End: 2022-03-11 | Stop reason: HOSPADM

## 2022-03-06 RX ADMIN — HYDROCORTISONE SODIUM SUCCINATE 50 MG: 100 INJECTION, POWDER, FOR SOLUTION INTRAMUSCULAR; INTRAVENOUS at 21:15

## 2022-03-06 RX ADMIN — DEXTROSE AND SODIUM CHLORIDE 100 ML/HR: 5; 900 INJECTION, SOLUTION INTRAVENOUS at 01:00

## 2022-03-06 RX ADMIN — HYDROCORTISONE SODIUM SUCCINATE 50 MG: 100 INJECTION, POWDER, FOR SOLUTION INTRAMUSCULAR; INTRAVENOUS at 05:57

## 2022-03-06 RX ADMIN — VANCOMYCIN HYDROCHLORIDE 750 MG: 750 INJECTION, SOLUTION INTRAVENOUS at 16:01

## 2022-03-06 RX ADMIN — SENNOSIDES AND DOCUSATE SODIUM 2 TABLET: 50; 8.6 TABLET ORAL at 20:08

## 2022-03-06 RX ADMIN — Medication 10 ML: at 20:09

## 2022-03-06 RX ADMIN — SUCRALFATE 1 G: 1 TABLET ORAL at 20:08

## 2022-03-06 RX ADMIN — TAZOBACTAM SODIUM AND PIPERACILLIN SODIUM 3.38 G: 375; 3 INJECTION, SOLUTION INTRAVENOUS at 14:13

## 2022-03-06 RX ADMIN — Medication 10 ML: at 14:14

## 2022-03-06 RX ADMIN — HYDROCORTISONE SODIUM SUCCINATE 50 MG: 100 INJECTION, POWDER, FOR SOLUTION INTRAMUSCULAR; INTRAVENOUS at 14:31

## 2022-03-06 RX ADMIN — TAZOBACTAM SODIUM AND PIPERACILLIN SODIUM 3.38 G: 375; 3 INJECTION, SOLUTION INTRAVENOUS at 21:15

## 2022-03-06 RX ADMIN — PANTOPRAZOLE SODIUM 40 MG: 40 INJECTION, POWDER, FOR SOLUTION INTRAVENOUS at 20:09

## 2022-03-06 RX ADMIN — TAZOBACTAM SODIUM AND PIPERACILLIN SODIUM 3.38 G: 375; 3 INJECTION, SOLUTION INTRAVENOUS at 05:57

## 2022-03-06 RX ADMIN — ENOXAPARIN SODIUM 30 MG: 30 INJECTION SUBCUTANEOUS at 05:57

## 2022-03-06 NOTE — PROGRESS NOTES
"Pharmacy Consult-Vancomycin Dosing  Karen Larkin is a  66 y.o. female receiving vancomycin therapy.     Indication:sepsis/PNA  Consulting Provider: Nereida KENNEDY  ID Consult:     Goal Trough: 15-20    Current Antimicrobial Therapy  Anti-Infectives (From admission, onward)      Ordered     Dose/Rate Route Frequency Start Stop    03/05/22 2215  vancomycin (dosing per levels)        Ordering Provider: Nereida Estrada APRN     Does not apply Daily 03/06/22 0900 03/13/22 0859    03/05/22 2215  piperacillin-tazobactam (ZOSYN) 3.375 g in iso-osmotic dextrose 50 ml (premix)        Ordering Provider: Nereida Estrada APRN    3.375 g  over 4 Hours Intravenous Every 8 Hours 03/06/22 0600 03/13/22 0559    03/05/22 2008  piperacillin-tazobactam (ZOSYN) 3.375 g in iso-osmotic dextrose 50 ml (premix)        Ordering Provider: Grant Crouch PA    3.375 g  over 30 Minutes Intravenous Once 03/05/22 2010 03/05/22 2151 03/05/22 2008  vancomycin in dextrose 5% 150 mL (VANCOCIN) IVPB 750 mg        Ordering Provider: Grant Crouch PA    20 mg/kg × 42.6 kg Intravenous Once 03/05/22 2010 03/05/22 2205            Allergies  Allergies as of 03/05/2022    (No Known Allergies)       Labs    Results from last 7 days   Lab Units 03/05/22  1934 03/04/22  0544 03/03/22  0640   BUN mg/dL 36* 19 18   CREATININE mg/dL 0.97 0.41* 0.50*       Results from last 7 days   Lab Units 03/05/22  1934 03/04/22  0544 03/03/22  0640   WBC 10*3/mm3 1.88* 5.40 8.80       Evaluation of Dosing     Last Dose Received in the ED/Outside Facility: 750mg  Is Patient on Dialysis or Renal Replacement: n    Ht - 142.2 cm (56\")  Wt - 42.6 kg (94 lb)    Estimated Creatinine Clearance: 39.4 mL/min (by C-G formula based on SCr of 0.97 mg/dL).    Intake & Output (last 3 days)         03/03 0701 03/04 0700 03/04 0701 03/05 0700 03/05 0701 03/06 0700    IV Piggyback   1328    Total Intake(mL/kg)   1328 (31.2)    Net   +1328                   Microbiology and " Radiology  Microbiology Results (last 10 days)       Procedure Component Value - Date/Time    COVID PRE-OP / PRE-PROCEDURE SCREENING ORDER (NO ISOLATION) - Swab, Nasopharynx [[id number]]  (Normal) Collected: 03/05/22 2008    Lab Status: Final result Specimen: Swab from Nasopharynx Updated: 03/05/22 2042    Narrative:      The following orders were created for panel order COVID PRE-OP / PRE-PROCEDURE SCREENING ORDER (NO ISOLATION) - Swab, Nasopharynx.  Procedure                               Abnormality         Status                     ---------                               -----------         ------                     COVID-19 and FLU A/B PCR...[[id number]]  Normal              Final result                 Please view results for these tests on the individual orders.    COVID-19 and FLU A/B PCR - Swab, Nasopharynx [[id number]]  (Normal) Collected: 03/05/22 2008    Lab Status: Final result Specimen: Swab from Nasopharynx Updated: 03/05/22 2042     COVID19 Not Detected     Influenza A PCR Not Detected     Influenza B PCR Not Detected    Narrative:      Fact sheet for providers: https://www.fda.gov/media/018491/download    Fact sheet for patients: https://www.fda.gov/media/401119/download    Test performed by PCR.    Urine Culture - Urine, Urine, Catheter In/Out [[id number]] Collected: 02/24/22 2125    Lab Status: Final result Specimen: Urine, Catheter In/Out Updated: 02/25/22 2315     Urine Culture 50,000 CFU/mL Mixed Arnaldo Isolated    Narrative:      Specimen contains mixed organisms of questionable pathogenicity which indicates contamination with commensal arnaldo.  Further identification is unlikely to provide clinically useful information.  Suggest recollection.    COVID PRE-OP / PRE-PROCEDURE SCREENING ORDER (NO ISOLATION) - Swab, Nasopharynx [[id number]]  (Normal) Collected: 02/24/22 0601    Lab Status: Final result Specimen: Swab from Nasopharynx Updated: 02/24/22 0655    Narrative:      The following orders were  created for panel order COVID PRE-OP / PRE-PROCEDURE SCREENING ORDER (NO ISOLATION) - Swab, Nasopharynx.  Procedure                               Abnormality         Status                     ---------                               -----------         ------                     COVID-19, FLU A/B, RSV P...[[id number]]  Normal              Final result                 Please view results for these tests on the individual orders.    COVID-19, FLU A/B, RSV PCR - Swab, Nasopharynx [[id number]]  (Normal) Collected: 02/24/22 0601    Lab Status: Final result Specimen: Swab from Nasopharynx Updated: 02/24/22 0655     COVID19 Not Detected     Influenza A PCR Not Detected     Influenza B PCR Not Detected     RSV, PCR Not Detected    Narrative:      Fact sheet for providers: https://www.fda.gov/media/404040/download    Fact sheet for patients: https://www.fda.gov/media/211312/download    Test performed by PCR.            Vancomycin Levels:                        Assessment/Plan:  The patient will be started on a bolus of 20mg/kg for a dose of 750mg . Given the patient's current renal status, will dose per random levels and obtain a random level with morning labs before ordering another dose.  Due to infection severity, will target a trough of 15-20.    Pharmacy will continue to follow the patient’s culture results and clinical progress daily.    Avery Freeman, IsraelD

## 2022-03-06 NOTE — PLAN OF CARE
Goal Outcome Evaluation:      Pt arrived to 2HICU around 0045 on partial rebreather and in A-fib with rate 100-130. SBP . Pt transitioned to HFNC and tolerating current settings. Pt intermittently confused to situation and place. One incontinent episode. PICC consult in place.

## 2022-03-06 NOTE — SIGNIFICANT NOTE
Orders received for dysphagia evaluation, SLP spoke w/ RN who reports pt is not appropriate for evaluation. Will check back on pt's status tomorrow. Thank you, SLPx [phone]

## 2022-03-06 NOTE — ED PROVIDER NOTES
"Subjective   Ms. Larkin is a 66 year old female who arrives via EMS from [facility] with complaints of increasing SOA and generalized weakness.  She was recently admitted here for upper GI bleed while on Xarelto for A-fib.  She was noted to have a large paraesophageal hernia as well as some erosive gastric reflux disease.  She was ultimately discharged to [facility] and has been taken off her Xarelto.  The pt is a poor historian but tells me that she is \"just feeling weak and short of breath\".  She states she has some chest pressure as well.  She believes her symptoms started today but she is not sure.  She denies any prior history of lung disease.  She denies abdominal pain but notes she has had some recent vomiting and diarrhea.  No current urinary symptoms.  She is unable to provide much further history.    On review of her records from [facility] that were sent with her, it appears she had a recent perioperative multifocal CVA on 2/13/22 s/p open sacrocolpopexy 2/8/22 (Dr. Ann).  She has some dysarthria, dyspagia, and cognitive deficits as well as balance disturbance.  She also has a history of valvular heart disease, chronic diastolic CHF, HTN, RA on chronic steroids, narcolepsy, autoimmune hepatitis, and polystic kidney and liver dz.  Her records indicate that she is a full-code.            Review of Systems   Constitutional: Positive for fatigue. Negative for diaphoresis.   HENT: Negative for nosebleeds.    Respiratory: Positive for shortness of breath. Negative for cough.    Cardiovascular: Positive for chest pain and palpitations.   Gastrointestinal: Positive for diarrhea, nausea and vomiting. Negative for abdominal pain and blood in stool.   Genitourinary: Negative for dysuria.   Musculoskeletal: Negative for back pain.   Skin: Negative for pallor.   Neurological: Positive for weakness (generalized). Negative for headaches.   Hematological: Negative.    Psychiatric/Behavioral: Negative.  "       Past Medical History:   Diagnosis Date   • Anemia    • Arthritis     Rheumatoid   • GERD (gastroesophageal reflux disease)    • Hiatal hernia    • Liver disease    • Murmur    • Rheumatoid arthritis (HCC)    • Varicose veins of both lower extremities        No Known Allergies    Past Surgical History:   Procedure Laterality Date   • ENDOSCOPY N/A 2/24/2022    Procedure: ESOPHAGOGASTRODUODENOSCOPY;  Surgeon: Brunner, Mark I, MD;  Location: [facility] ENDOSCOPY;  Service: Gastroenterology;  Laterality: N/A;   • HERNIA REPAIR     • HIP SURGERY Right    • SHOULDER SURGERY Left        Family History   Problem Relation Age of Onset   • Heart disease Mother    • Cancer Mother         Colon   • Stroke Mother    • Cancer Father         Esophageal   • Stroke Father    • Cancer Sister         Colon       Social History     Socioeconomic History   • Marital status:    Tobacco Use   • Smoking status: Never Smoker   • Smokeless tobacco: Never Used   Substance and Sexual Activity   • Alcohol use: Never   • Drug use: Never   • Sexual activity: Defer           Objective   Physical Exam  Constitutional:       Comments: Appears chronically ill, malnourished.   HENT:      Head: Normocephalic.      Nose: Nose normal.      Mouth/Throat:      Mouth: Mucous membranes are moist.   Eyes:      Conjunctiva/sclera: Conjunctivae normal.      Pupils: Pupils are equal, round, and reactive to light.   Cardiovascular:      Rate and Rhythm: Tachycardia present. Rhythm irregular.   Pulmonary:      Effort: Pulmonary effort is normal.      Comments: Mild bilateral rhonchi, mild rales.  Abdominal:      General: Bowel sounds are normal.      Tenderness: There is no abdominal tenderness. There is no guarding.   Musculoskeletal:         General: No tenderness. Normal range of motion.      Cervical back: Normal range of motion.      Right lower leg: No edema.      Left lower leg: No edema.   Skin:     General: Skin is warm and dry.   Neurological:       Mental Status: She is alert.      Comments: Mildly confused.  Moves all extremities.   Psychiatric:         Mood and Affect: Mood normal.         Critical Care  Performed by: Grant Crouch PA  Authorized by: Marquis Maldonado DO     Critical care provider statement:     Critical care time (minutes):  60    Critical care time was exclusive of:  Separately billable procedures and treating other patients    Critical care was necessary to treat or prevent imminent or life-threatening deterioration of the following conditions:  Sepsis and circulatory failure    Critical care was time spent personally by me on the following activities:  Development of treatment plan with patient or surrogate, evaluation of patient's response to treatment, examination of patient, obtaining history from patient or surrogate, ordering and performing treatments and interventions, ordering and review of laboratory studies, ordering and review of radiographic studies, pulse oximetry, re-evaluation of patient's condition and review of old charts           Differential dx includes pneumonia, CHF, COVID, PE, sepsis, a-fib with RVR, etc.  ED Course  ED Course as of 03/05/22 2208   Sat Mar 05, 2022   2113 Total Protein: 6.5 [HB]      ED Course User Index  [HB] Grant Crouch PA      The pt is hypotensive in the 70s systolic.  Her EKG shows A-fib with RVR with rate in the 120.  She is noted to be quite leukopenic with a white count of 1.88.  Nml H&H at 13/39.  Glucose is 72.  Chemistries are unremarkable.  Her bicarb is 21 with anion gap of 16.  Her troponin is 0.019.  ProBNP is 2577.  Lactic acid is 2.1.  Her COVID swab is negative for COVID or influenza.      CXR:  Low lung volumes. There is cardiomegaly with vascular congestion. There  are bilateral perihilar airspace opacities that likely represent edema,  less likely pneumonia.    Due to her hypotension, she was started on IV fluids and was given a sepsis bolus of 1200ml.  She  was given Digoxin 250mcg IV for her A-fib with RVR.  Her BP is too low for calcium channel blockers or beta blockers at this time.  Blood cultures were obtained and she was given IV Zosyn and Vanc to cover for sepsis.    A cath UA was ordered.       CTA chest:  1. No evidence of pulmonary embolism.   2. Patchy consolidative airspace disease in both lungs, with near complete consolidation of the lower lobes. Findings likely represent pneumonia.  3. Large hiatal hernia with intrathoracic stomach. The esophagus is also filled with fluid, suggesting gastroesophageal reflux.  4. Cardiomegaly and coronary artery atherosclerosis.  5. Chronic changes below the diaphragm involving the liver and spleen.    The pt is receiving IV fluid and abx for bilateral pneumonia.  Pt will require ICU admission.                                              MDM    Final diagnoses:   Multifocal pneumonia   Atrial fibrillation with RVR (HCC)   Sepsis, due to unspecified organism, unspecified whether acute organ dysfunction present (HCC)   Hypotension, unspecified hypotension type   Hypoxemia       ED Disposition  ED Disposition     ED Disposition   Decision to Admit    Condition   --    Comment   Level of Care: Critical Care [6]   Diagnosis: Multifocal pneumonia [[id number]]   Admitting Physician: ROMEO CASTAÑEDA [[id number]]   Certification: I Certify That Inpatient Hospital Services Are Medically Necessary For Greater Than 2 Midnights               No follow-up provider specified.       Medication List      No changes were made to your prescriptions during this visit.          Grant Crouch PA  03/05/22 5855       Grant Crouch PA  03/05/22 4981

## 2022-03-06 NOTE — H&P
INTENSIVIST   INITIAL HOSPITAL VISIT NOTE     Hospital:  LOS: 0 days     Ms. Karen Larkin, 66 y.o. female is seen for a Chief Complaint of:  Chief Complaint   Patient presents with   • Shortness of Breath       Severe sepsis (HCC)    Hiatal hernia with gastroesophageal reflux    Rheumatoid arthritis    PAF (paroxysmal atrial fibrillation) (HCC)    Elevated brain natriuretic peptide (BNP) level    Elevated BUN    Dehydration    Elevated alkaline phosphatase level    Serum total bilirubin elevated    Elevated lactic acid level    Leukopenia    Aspiration pneumonia (HCC)    Acute respiratory failure with hypoxia (HCC)      Subjective   S     Ms. Larkin is a 67 yo female with extensive PMH including RA (on prednisone), PAF (on xarelto), HTN, HLD, GERD, iron deficiency, s/p recent bladder prolapse repair @ [place] and anemia who presented to ED via [facility] for SOA and generalized weakness. Patient with multiple admissions over the last several months including a recent admission to [facility] for bladder prolapse surgery where she was discharged to [facility]. During her rehab stay, she developed hematemesis and was brought to [facility] for evaluation. During that admission she was found to have a large paraesophageal hernia as well as erosive gastric reflux disease ultimately determined to be related to her Xarelto. She was just discharged back to [facility] on 3/4 but developed SOA and weakness today (3/5) so she was brought back to [facility] for evaluation.     VS  On arrival: Temp 96.8, , RR 24, BP 77/57, Spo2 98%. Significant labs include: Flu A/B/ Covid Negative, PCT 1.88, lactate 2.1, WBC 1.88, HgB 13.2, Plts 264, glucose 72, BUN 36, Creat 0.97, Na 135, K 3.6, AST 19, ALT 8, Alk phos 245, total bili 2, troponin 0.019, BNP 2577.    CXR with low lung volumes. There is cardiomegaly with vascular congestion. There are bilateral perihilar airspace opacities that appear to be  edema vs. PNA.     CTA Chest: No PE. Patchy consolidative airspace disease bilaterally. Near complete consolidation of lower lobes. Large hiatal hernia with intrathoracic stomach, Esophagus is filled with fluid suggestive of GERD. Cardiomegaly and coronary artery atherosclerosis. Chronic changes below the diaphragm involving the liver and spleen.     In ED she received Vanc and Zosyn, Sepsis bolus protocol (1.278 ml NS) and Digoxin. She is being admitted to ICU for management of her critical illness.        PMH: She  has a past medical history of Anemia, Arthritis, GERD (gastroesophageal reflux disease), Hiatal hernia, Liver disease, Murmur, Rheumatoid arthritis (HCC), and Varicose veins of both lower extremities.   PSxH: She  has a past surgical history that includes Hernia repair; Shoulder surgery (Left); Hip surgery (Right); and Esophagogastroduodenoscopy (N/A, 2/24/2022).      Medications:  No current facility-administered medications on file prior to encounter.     Current Outpatient Medications on File Prior to Encounter   Medication Sig   • bisacodyl (DULCOLAX) 10 MG suppository Insert 1 suppository into the rectum Daily.   • bisacodyl (DULCOLAX) 5 MG EC tablet Take 1 tablet by mouth Daily As Needed for Constipation (Use if polyethylene glycol is ineffective).   • ferrous sulfate 325 (65 FE) MG tablet Take 325 mg by mouth 2 (two) times a day.   • HYDROcodone-acetaminophen (NORCO) 5-325 MG per tablet Take 1 tablet by mouth Every 4 (Four) Hours As Needed for Moderate Pain  for up to 6 days.   • metoprolol tartrate (LOPRESSOR) 50 MG tablet Take 1 tablet by mouth Every 12 (Twelve) Hours.   • pantoprazole (Protonix) 40 MG EC tablet Take 1 tablet by mouth 2 (Two) Times a Day.   • polyethylene glycol (MIRALAX) 17 g packet Take 17 g by mouth Daily As Needed (Use if senna-docusate is ineffective).   • predniSONE (DELTASONE) 1 MG tablet Take 5 mg by mouth Daily.   • saccharomyces boulardii (FLORASTOR) 250 MG capsule  Take 1 capsule by mouth 2 (Two) Times a Day.   • sennosides-docusate (PERICOLACE) 8.6-50 MG per tablet Take 2 tablets by mouth 2 (Two) Times a Day.   • sucralfate (CARAFATE) 1 g tablet Take 1 tablet by mouth 4 (Four) Times a Day Before Meals & at Bedtime.       Allergies: She has No Known Allergies.   FH: Her family history includes Cancer in her father, mother, and sister; Heart disease in her mother; Stroke in her father and mother.   SH: She  reports that she has never smoked. She has never used smokeless tobacco. She reports that she does not drink alcohol and does not use drugs.     The patient's relevant past medical, surgical and social history were reviewed and updated in Epic as appropriate.     ROS (14):   Review of Systems   Constitutional: Positive for fatigue.   HENT: Positive for trouble swallowing.    Eyes: Negative.    Respiratory: Positive for cough, choking and shortness of breath.    Cardiovascular: Negative.    Gastrointestinal: Negative.    Endocrine: Negative.    Genitourinary: Negative.    Musculoskeletal: Negative.    Skin: Negative.    Allergic/Immunologic: Negative.    Neurological: Positive for weakness.   Hematological: Negative.    Psychiatric/Behavioral: Negative.        Objective   O     Vitals    Temp  Min: 96.8 °F (36 °C)  Max: 96.8 °F (36 °C)  BP  Min: 76/64  Max: 99/80  Pulse  Min: 112  Max: 134  Resp  Min: 20  Max: 24  SpO2  Min: 94 %  Max: 100 % Flow (L/min)  Min: 13  Max: 15     I/O 24 hrs (7:00AM - 6:59 AM)  Intake/Output       03/05/22 0700 - 03/06/22 0659    Intake (ml) 1328    Output (ml) --    Net (ml) 1328    Last Weight 42.6 kg (94 lb)          Medications (drips):  Pharmacy Consult        Physical Examination    Telemetry:    Atrial Rhythm: atrial fibrillation      Constitutional:  No acute distress. Cachectic.   Awakens and is conversant. On a NRB mask.    HEENT: Normocephalic and atraumatic.   Neck:  Normal range of motion. Neck supple.   No JVD present.   No  thyromegaly.    Cardiovascular: Irregularly irregular.  No murmurs, rub or gallop.  No peripheral edema.   Respiratory: Normal respiratory effort.  Diminished bilaterally.   Bilateral rhonchi.    Abdominal:  Soft. No masses.   Non-tender. No distension.   No hepatosplenomegaly.   MSK: Normal muscle strength and tone.   Extremities: No digital cyanosis or clubbing.   Skin: Skin is warm and dry.  Multiple areas of ecchymosis.    Neurological:   Awakens to stimulation and is appropriate.   Moves all extremities.   Psychiatric:  Normal affect.   Normal judgment.            Results from last 7 days   Lab Units 03/05/22 1934 03/04/22 0544 03/03/22  0640   WBC 10*3/mm3 1.88* 5.40 8.80   HEMOGLOBIN g/dL 13.2 13.3 14.4   MCV fL 89.2 92.7 92.7   PLATELETS 10*3/mm3 264 235 285     Results from last 7 days   Lab Units 03/05/22 1934 03/04/22 0544 03/03/22  0640   SODIUM mmol/L 135* 137 135*   POTASSIUM mmol/L 3.6 3.7 5.1   CO2 mmol/L 21.0* 19.0* 22.0   CREATININE mg/dL 0.97 0.41* 0.50*     Estimated Creatinine Clearance: 39.4 mL/min (by C-G formula based on SCr of 0.97 mg/dL).  Results from last 7 days   Lab Units 03/05/22 1934 03/03/22 0640 03/02/22  0630   ALK PHOS U/L 245* 288* 364*   BILIRUBIN mg/dL 2.0* 0.6 0.8   ALT (SGPT) U/L 8 12 16   AST (SGOT) U/L 19 25 42*       Results from last 7 days   Lab Units 03/05/22  2242   PH, ARTERIAL pH units 7.450   PCO2, ARTERIAL mm Hg 31.1*   PO2 ART mm Hg 93.9   FIO2 % 80       Images:    Imaging Results (Last 24 Hours)     Procedure Component Value Units Date/Time    CT Angiogram Chest [[id number]] Collected: 03/05/22 2124     Updated: 03/05/22 2131    Narrative:      CT ANGIOGRAM OF THE CHEST    INDICATION: Hypotension, leukopenia, shortness of breath    TECHNIQUE: CT scan of the chest was performed following the administration of 80 mL Isovue-370 intravenous contrast. Imaging was performed to optimize evaluation of the pulmonary arterial system. CT dose lowering techniques were  used, to include:   automated exposure control, adjustment for patient size, and / or use of iterative reconstruction. MIP images were also generated.    COMPARISON: 2/24/2022    FINDINGS:  Vasculature: There are no filling defects in the central, lobar, proximal segmental or distal segmental pulmonary arteries. There is no evidence of right heart strain or pulmonary infarction. Coronary artery calcifications are present. The heart is   enlarged.    Mediastinum / Pleura: There is no pericardial or pleural effusion. There is no mediastinal, hilar or axillary lymphadenopathy. A large hiatal hernia is present, with intrathoracic stomach. The esophagus is filled with fluid along its length.    Airways / Lungs: The central airways are patent.  There is no pneumothorax. There is near complete consolidation of both lower lobes. Patchy airspace disease is present within the aerated upper lobes and right middle lobe.    Bones: No destructive osseous lesion is identified. A chronic L1 deformity is again seen.    Upper Abdomen: Numerous cystic lesions are present in the liver. Calcified lesions are also seen. A cystic lesion is also present in the spleen.      Impression:        1. No evidence of pulmonary embolism.   2. Patchy consolidative airspace disease in both lungs, with near complete consolidation of the lower lobes. Findings likely represent pneumonia.  3. Large hiatal hernia with intrathoracic stomach. The esophagus is also filled with fluid, suggesting gastroesophageal reflux.  4. Cardiomegaly and coronary artery atherosclerosis.  5. Chronic changes below the diaphragm involving the liver and spleen.    Electronically signed by:  Tay Funez M.D.    3/5/2022 7:30 PM Mountain Time    XR Chest 1 View [[id number]] Collected: 03/05/22 1950     Updated: 03/05/22 1954    Narrative:      DATE OF EXAM: 3/5/2022 7:20 PM     PROCEDURE: XR CHEST 1 VW-     INDICATIONS: SOA triage protocol     COMPARISON: 2/22/2022      TECHNIQUE: Single radiographic AP view of the chest was obtained.     FINDINGS:  Low lung volumes. Large hiatal hernia which partially obscures the heart  border. There is probable cardiomegaly. Central pulmonary vessels are  prominent. There are perihilar airspace opacities bilaterally.        Impression:      Low lung volumes. There is cardiomegaly with vascular congestion. There  are bilateral perihilar airspace opacities that likely represent edema,  less likely pneumonia     This report was finalized on 3/5/2022 7:51 PM by Terrell Marx.               I reviewed the patient's new laboratory and imaging results.  I independently reviewed the patient's new images.    Assessment/Plan   A / P       Ms. Larkin is a 67yo F with a history of RA on chronic prednisone, PAF on Xarelto, HTN, HLD, hiatal hernia with GERD, chronic malnutrition and iron deficiency anemia who presented to the [facility] ED on 3/5/22 from [facility] with shortness of breath and weakness. She was discharged from [facility] on 3/4 after admission for hematemesis. During that admission, she was found to have a large paraesophageal hernia and erosive gastric reflux related to Xarelto. Xarelto has since been stopped.     Upon arrival to the ED, she was placed on a nonrebreather. She was noted to be hypotensive with a SBP in the 70s and was given a bolus of NS. CT chest was significant for bilateral lower lobe consolidations and fluid/debris in the right mainstem consistent with aspiration. She reports compliance with the recommended liquid diet.     She has been given Vancomycin and Zosyn.     Nutrition:   NPO Diet  Advance Directives:   Code Status and Medical Interventions:   Ordered at: 03/05/22 1893     Code Status (Patient has no pulse and is not breathing):    CPR (Attempt to Resuscitate)     Medical Interventions (Patient has pulse or is breathing):    Full Support       Active Hospital Problems    Diagnosis    • **Severe sepsis (HCC)    • Elevated  brain natriuretic peptide (BNP) level    • Elevated BUN    • Dehydration    • Elevated alkaline phosphatase level    • Serum total bilirubin elevated    • Elevated lactic acid level    • Leukopenia    • Aspiration pneumonia (HCC)    • Acute respiratory failure with hypoxia (HCC)    • PAF (paroxysmal atrial fibrillation) (HCC)      On Xarelto     • Rheumatoid arthritis      On chronic Prednisone     • Hiatal hernia with gastroesophageal reflux        Assessment / Plan:    1. Admit to ICU  2. Continue NRB until ICU. May attempt to transition to HFNC. High risk for decompensation requiring intubation.   3. Continue volume resuscitation. Vasopressors if needed.   4. PICC tomorrow. Will place CVC tonight if needed.   5. NPO diet. May need keofeed placement with post-pyloric placement for nutrition and to prevent aspiration.   6. HOB elevated to prevent further reflux aspiration.   7. Continue empiric antibiotics.   8. Stress dose steroids as she is on chronic prednisone.   9. Hold home Metoprolol until BP improves.   10. AM labs    High risk for decompensation secondary to severe respiratory failure.     Plan of care and goals reviewed during interdisciplinary rounds.  I discussed the patient's findings and my recommendations with patient and nursing staff      Karen Sharp, DO  Pulmonary and Critical Care Medicine    Electronically signed by MARCIA Luna, 03/05/22, 10:02 PM EST.    I have personally seen, interviewed and examined the patient and verified all the key components of the history, physical examination, assessment and plan with MARCIA Rivera, [facility] and reviewed the note, which reflects my changes and contributions.

## 2022-03-06 NOTE — NURSING NOTE
[facility] consulted for: multiple non-cory/purple areas non coccyx and heels.    Assessment and Care provided:       [facility] RN consulted bedside nurse and patient may be turned.  Patient on low air loss surface, bilateral pressure relieving boots in place.  Patient has had an episode of emesis, cleansed patient's skin and replaced gown, and informed nurse so that they could provide bed change and bath.  Identified red scaly area with scarring to periwound skin to patient's left medial lower leg.  When patient question response that this was due to graft in the past.  Identified unstageable pressure injury to right lateral heel with slough to base.  Cleansed with normal saline skin protectant spray applied to periwound skin therapy honey to wound bed and covered with silicone foam dressing reapplied pressure relieving boot.  Patient turned with assistance, barrier cream in place over bottom, cleansed with barrier wipes.  Identified incontinence associated dermatitis with blanchable red skin.  Patient has had pressure injuries to her coccyx and bottom previously so she is at high risk for developing pressure injuries again.  Felix score = 12.        Recommendation(s):      See wound and skin care orders.      *Maintain good skin care, keep dry, turn Q2H, keep heels elevated and offloaded with heel boots.      *Apply Z-guard/barrier cream to perineal area/bilateral gluteal regions/sacrum/coccyx BID and as needed with incontinence episodes.    *Follow C.A.R.E protocol if medical devices (Bipap, banda, Ng tube, etc) are being used.      All skin interventions in place.  Head to toe assessment completed. Left Heel and bottom blanchable, intact and bilateral heels offloaded.      Thank you for consulting the [facility] Nurse.  Will plan to follow up.  If alteration to skin integrity or change in wound bed presentation, please contact [facility] team.     Left medial lower leg      Coccyx/sacrum/bilateral gluteal/gluteal cleft      Right  lateral heel

## 2022-03-06 NOTE — PROGRESS NOTES
Pulmonary/Critical Care Follow-up     LOS: 1 day   Patient Care Team:  Margarita Mcgovern MD as PCP - General (Internal Medicine)      Chief Complaint   Patient presents with   • Shortness of Breath     Subjective      Initial history (from H&P 3/5/2022):    Ms. Larkin is a 65 yo female with extensive PMH including RA (on prednisone), PAF (on xarelto), HTN, HLD, GERD, iron deficiency, s/p recent bladder prolapse repair @ [place] and anemia who presented to ED via [facility] for SOA and generalized weakness. Patient with multiple admissions over the last several months including a recent admission to [facility] for bladder prolapse surgery where she was discharged to [facility]. During her rehab stay, she developed hematemesis and was brought to [facility] for evaluation. During that admission she was found to have a large paraesophageal hernia as well as erosive gastric reflux disease ultimately determined to be related to her Xarelto. She was just discharged back to [facility] on 3/4 but developed SOA and weakness today (3/5) so she was brought back to [facility] for evaluation.      VS  On arrival: Temp 96.8, , RR 24, BP 77/57, Spo2 98%. Significant labs include: Flu A/B/ Covid Negative, PCT 1.88, lactate 2.1, WBC 1.88, HgB 13.2, Plts 264, glucose 72, BUN 36, Creat 0.97, Na 135, K 3.6, AST 19, ALT 8, Alk phos 245, total bili 2, troponin 0.019, BNP 2577.     CXR with low lung volumes. There is cardiomegaly with vascular congestion. There are bilateral perihilar airspace opacities that appear to be edema vs. PNA.      CTA Chest: No PE. Patchy consolidative airspace disease bilaterally. Near complete consolidation of lower lobes. Large hiatal hernia with intrathoracic stomach, Esophagus is filled with fluid suggestive of GERD. Cardiomegaly and coronary artery atherosclerosis. Chronic changes below the diaphragm involving the liver and spleen.      In ED she received Vanc and Zosyn,  Sepsis bolus protocol (1.278 ml NS) and Digoxin. She is being admitted to ICU for management of her critical illness.     (End of copied text).    Interval History:     Patient is awake and alert today.  High flow nasal cannula has been weaned down to 30% with adequate oxygen saturations.  Denies dyspnea.  She is somewhat confused.  No fevers or chills.  Blood cultures growing gram-positive cocci.  Lab and nursing staff have been unable to draw blood today.    History taken from: Patient, chart    PMH/FH/Social History were reviewed and updated appropriately in the electronic medical record.     Review of Systems:    Review of 14 systems was completed with positives and pertinent negatives noted in the subjective section.  All other systems reviewed and are negative.         Objective     Vital Signs  Temp:  [96.8 °F (36 °C)-98.7 °F (37.1 °C)] 98.7 °F (37.1 °C)  Heart Rate:  [105-134] 106  Resp:  [20-26] 26  BP: ()/() 95/66  03/05 0701 - 03/06 0700  In: 2348 [I.V.:870]  Out: 100 [Urine:100]  Body mass index is 22.83 kg/m².     IV drips:  dextrose 5 % and sodium chloride 0.9 %, Last Rate: 100 mL/hr (03/06/22 0100)  Pharmacy Consult       Physical Exam:     Constitutional:   Alert, cooperative, in no acute distress   Head:   Normocephalic, without obvious abnormality, atraumatic   Eyes:           Lids and lashes normal, conjunctivae and sclerae normal.  No icterus, no pallor, corneas clear, PER   ENMT:  Ears appear intact with no abnormalities noted     No oral lesions, no thrush, oral mucosa moist   Neck:  No adenopathy, supple, trachea midline, no thyromegaly, no JVD   Lungs/Resp:    Normal effort, symmetric chest rise, moderate bibasilar rales, no chest wall tenderness                Heart/CV:   Regular rhythm and normal rate, normal S1 and S2, no            murmur   Abdomen/GI:    Normal bowel sounds, no masses, no organomegaly, soft        nontender, nondistended   :    Deferred   Extremities/MSK:   No clubbing or cyanosis.  No edema.  Normal tone.    No deformities.    Pulses:  Pulses palpable and equal bilaterally   Skin:  No bleeding, bruising or rash   Heme/Lymph:  No cervical or supraclavicular adenopathy.   Neurologic:    Psychiatric:    Moves all extremities with no obvious focal motor deficit.  Cranial nerves 2 - 12 grossly intact  Mildly confused, normal affect.    The above physical exam findings were reviewed and reflect exam findings as of today's exam.   Electronically signed by:Zenon Ramirez MD 03/06/22/11:58 EST    Results Review:     I reviewed the patient's new clinical results.   Results from last 7 days   Lab Units 03/05/22 1934 03/04/22  0544 03/03/22  0640 03/02/22  0630   SODIUM mmol/L 135* 137 135* 138   POTASSIUM mmol/L 3.6 3.7 5.1 3.4*   CHLORIDE mmol/L 98 105 101 103   CO2 mmol/L 21.0* 19.0* 22.0 16.0*   BUN mg/dL 36* 19 18 13   CREATININE mg/dL 0.97 0.41* 0.50* 0.56*   CALCIUM mg/dL 9.7 9.2 9.7 9.7   BILIRUBIN mg/dL 2.0*  --  0.6 0.8   ALK PHOS U/L 245*  --  288* 364*   ALT (SGPT) U/L 8  --  12 16   AST (SGOT) U/L 19  --  25 42*   GLUCOSE mg/dL 72 78 110* 142*     Results from last 7 days   Lab Units 03/05/22 1934 03/04/22 0544 03/03/22  0640   WBC 10*3/mm3 1.88* 5.40 8.80   HEMOGLOBIN g/dL 13.2 13.3 14.4   HEMATOCRIT % 39.6 41.7 44.5   PLATELETS 10*3/mm3 264 235 285     Results from last 7 days   Lab Units 03/05/22  2242   PH, ARTERIAL pH units 7.450   PO2 ART mm Hg 93.9   PCO2, ARTERIAL mm Hg 31.1*   HCO3 ART mmol/L 21.6           I reviewed the patient's new imaging including images and reports.  Chest x-ray today shows PICC line which appears to be in the right atrium.  Bilateral infiltrates with intrathoracic stomach on left.    Medication Review:   bisacodyl, 10 mg, Rectal, Daily  enoxaparin, 30 mg, Subcutaneous, Q24H  hydrocortisone sodium succinate, 50 mg, Intravenous, Q8H  pantoprazole, 40 mg, Oral, BID  piperacillin-tazobactam, 3.375 g, Intravenous,  Q8H  sennosides-docusate, 2 tablet, Oral, BID  sodium chloride, 10 mL, Intravenous, Q12H  sodium chloride, 10 mL, Intravenous, Q12H  sucralfate, 1 g, Oral, 4x Daily AC & at Bedtime  vancomycin (dosing per levels), , Does not apply, Daily      dextrose 5 % and sodium chloride 0.9 %, 100 mL/hr, Last Rate: 100 mL/hr (03/06/22 0100)  Pharmacy Consult,         Assessment/Plan         Severe sepsis (HCC)    Hiatal hernia with gastroesophageal reflux    Rheumatoid arthritis    PAF (paroxysmal atrial fibrillation) (HCC)    Elevated brain natriuretic peptide (BNP) level    Elevated BUN    Dehydration    Elevated alkaline phosphatase level    Serum total bilirubin elevated    Elevated lactic acid level    Leukopenia    Aspiration pneumonia (HCC)    Acute respiratory failure with hypoxia (HCC)    66 y.o. female with history of rheumatoid arthritis on chronic prednisone, paroxysmal atrial fibrillation on Xarelto, hypertension, hyperlipidemia, hiatal hernia, GERD, chronic malnutrition, iron deficiency anemia, who presented to [facility] emergency department on 3/5/2022 from [facility] with weakness and shortness of breath.  She was recently admitted to Psychiatric for hematemesis and was discharged on 3/4/2022.  She had a large paraesophageal hernia and erosive gastric reflux with bleeding related to Xarelto.  Xarelto was stopped.    Patient was hypotensive on arrival to emergency department as well as being hypoxemic.  CT scan showed bilateral lower lobe consolidations.  I personally did not see any evidence of endobronchial debris on imaging however pattern of infiltrates is suggestive of aspiration pneumonitis versus pneumonia.    Blood cultures are growing gram-positive cocci.  Patient is on broad-spectrum antibiotics.    Plan:  1.  For pneumonia/acute respiratory failure: Continue broad-spectrum antibiotics.  Await cultures.  Speech evaluation.  PICC line placement.   Supplemental oxygen as needed.  2.  For sepsis: Follow-up cultures, continue antibiotics, also await urine culture.  3.  For hiatal hernia/GERD: Continue Protonix and sucralfate.  4.  For poor peripheral access, inability to draw labs: Place PICC line.  Okay to draw labs from PICC line as there is no other option and benefit outweighs risks.  5.  For rheumatoid arthritis/concern for adrenal insufficiency: Continue hydrocortisone for now and ultimately wean back down to home steroid dose.  6.  DVT prophylaxis: Lovenox.    Patient is critically ill and has high risk of life-threatening decline in condition due to respiratory failure and sepsis.  As such, she requires continuous monitoring frequent reassessment for consideration of adjustment management to minimize this risk.  I personally reassessed her multiple times today.    Critical care time : 40 minutes spent by me personally.(This excludes time spent performing separately reportable procedures and services). including high complexity decision making to assess, manipulate, and support vital organ system failure in this individual who has impairment of one or more vital organ systems such that there is a high probability of imminent or life threatening deterioration in the patient’s condition.    SEPTIC SHOCK FOCUSED EXAM    *Must be completed by a licensed independent Practitioner within 6 hours of diagnosis for the following conditions -- Septic Shock or Severe Sepsis with Lactate >/= 4.    Fluid bolus must be completed prior to assessment.      Diagnosis: Septic Shock     Vital Signs (Attestation) Reviewed Temp, HR, RR, BP, SpO2   Shock Index (HR/SBP) (Attestation) Reviewed    Urine Output  increased   General ill appearing   Respiratory no respiratory distress and rales   Cardiac/Chest regular rate, rhythm and no murmur   Skin (documentation of skin color is required) warm/dry   Capillary Refill <3 seconds   Peripheral Pulses Checked                           radial  palpable and dorsal-pedis  palpable     † Septic shock is defined by CMS as severe sepsis plus one of the following: persistent hypotension after fluid bolus OR tissue hypoperfusion (Lactic Acid ?4)  †† ONE OF THE FOLLOWING can be done in lieu of the Focused Exam: Measure CVP; Measure ScVO2; Echocardiogram (cardiac echo or cardiac ultrasound); Dynamic assessment of fluid responsiveness with passive leg raise or fluid challenge.    Zenon Ramirez MD  03/06/22  12:06 EST        Electronically signed by:  Zenon Ramirez MD  03/06/22  11:54 EST        *. Please note that portions of this note were completed with [employer] - a voice recognition program.

## 2022-03-06 NOTE — PROGRESS NOTES
"Pharmacy Consult-Vancomycin Dosing  Karen Larkin is a  66 y.o. female receiving vancomycin therapy.     Indication: Sepsis/pneumonia, E faecalis bacteremia   Consulting Provider: Intensivist  ID Consult: No    Goal Trough: 15-20 mcg/mL    Current Antimicrobial Therapy  Anti-Infectives (From admission, onward)      Ordered     Dose/Rate Route Frequency Start Stop    03/06/22 1528  vancomycin in dextrose 5% 150 mL (VANCOCIN) IVPB 750 mg        Ordering Provider: Carmela Lizarraga PharmD    750 mg Intravenous Every 12 Hours 03/07/22 0000 03/13/22 2359    03/06/22 1526  vancomycin in dextrose 5% 150 mL (VANCOCIN) IVPB 750 mg        Ordering Provider: Carmela Lizarraga PharmD    750 mg  over 60 Minutes Intravenous Once 03/06/22 1615      03/05/22 2215  piperacillin-tazobactam (ZOSYN) 3.375 g in iso-osmotic dextrose 50 ml (premix)        Ordering Provider: Nereida Estrada APRN    3.375 g  over 4 Hours Intravenous Every 8 Hours 03/06/22 0600 03/13/22 0559    03/05/22 2008  piperacillin-tazobactam (ZOSYN) 3.375 g in iso-osmotic dextrose 50 ml (premix)        Ordering Provider: Grant Crouch PA    3.375 g  over 30 Minutes Intravenous Once 03/05/22 2010 03/05/22 2151    03/05/22 2008  vancomycin in dextrose 5% 150 mL (VANCOCIN) IVPB 750 mg        Ordering Provider: Grant Crouch PA    20 mg/kg × 42.6 kg Intravenous Once 03/05/22 2010 03/05/22 2346          Allergies  Allergies as of 03/05/2022    (No Known Allergies)     Labs    Results from last 7 days   Lab Units 03/06/22  1257 03/05/22  1934 03/04/22  0544   BUN mg/dL 31* 36* 19   CREATININE mg/dL 0.49* 0.97 0.41*     Results from last 7 days   Lab Units 03/06/22  1257 03/05/22  1934 03/04/22  0544   WBC 10*3/mm3 4.83 1.88* 5.40     Evaluation of Dosing     Is Patient on Dialysis or Renal Replacement: No    Ht - 142.2 cm (56\")  Wt - 46.2 kg (101 lb 13.6 oz)    Estimated Creatinine Clearance: 84.6 mL/min (A) (by C-G formula based on SCr of 0.49 mg/dL " (L)).    Intake & Output (last 3 days)         03/03 0701  03/04 0700 03/04 0701  03/05 0700 03/05 0701  03/06 0700 03/06 0701  03/07 0700    I.V. (mL/kg)   870 (18.8)     IV Piggyback   1478     Total Intake(mL/kg)   2348 (50.8)     Urine (mL/kg/hr)   100 100 (0.3)    Emesis/NG output    800    Total Output   100 900    Net   +2248 -900            Urine Unmeasured Occurrence   1 x     Emesis Unmeasured Occurrence    3 x          Microbiology and Radiology  Microbiology Results (last 10 days)       Procedure Component Value - Date/Time    COVID PRE-OP / PRE-PROCEDURE SCREENING ORDER (NO ISOLATION) - Swab, Nasopharynx [[id number]]  (Normal) Collected: 03/05/22 2008    Lab Status: Final result Specimen: Swab from Nasopharynx Updated: 03/05/22 2042    Narrative:      The following orders were created for panel order COVID PRE-OP / PRE-PROCEDURE SCREENING ORDER (NO ISOLATION) - Swab, Nasopharynx.  Procedure                               Abnormality         Status                     ---------                               -----------         ------                     COVID-19 and FLU A/B PCR...[[id number]]  Normal              Final result                 Please view results for these tests on the individual orders.    COVID-19 and FLU A/B PCR - Swab, Nasopharynx [[id number]]  (Normal) Collected: 03/05/22 2008    Lab Status: Final result Specimen: Swab from Nasopharynx Updated: 03/05/22 2042     COVID19 Not Detected     Influenza A PCR Not Detected     Influenza B PCR Not Detected    Narrative:      Fact sheet for providers: https://www.fda.gov/media/587665/download    Fact sheet for patients: https://www.fda.gov/media/692481/download    Test performed by PCR.    Blood Culture - Blood, Hand, Right [[id number]]  (Abnormal) Collected: 03/05/22 2005    Lab Status: Preliminary result Specimen: Blood from Hand, Right Updated: 03/06/22 1110     Blood Culture Abnormal Stain     Gram Stain Aerobic Bottle Gram positive cocci in  pairs and chains      Anaerobic Bottle Gram positive cocci in pairs and chains    Blood Culture ID, PCR - Blood, Hand, Right [[id number]]  (Abnormal) Collected: 03/05/22 2005    Lab Status: Final result Specimen: Blood from Hand, Right Updated: 03/06/22 1111     BCID, PCR Enterococcus faecalis. Ashley/B (vancomycin resistance gene) not detected. Identification by BCID2 PCR.    Narrative:      Infectious disease consultation is highly recommended to rule out distant foci of infection.    Blood Culture - Blood, Arm, Right [[id number]]  (Abnormal) Collected: 03/05/22 2000    Lab Status: Preliminary result Specimen: Blood from Arm, Right Updated: 03/06/22 1110     Blood Culture Abnormal Stain     Gram Stain Aerobic Bottle Gram positive cocci in pairs and chains      Anaerobic Bottle Gram positive cocci in pairs and chains    Urine Culture - Urine, Urine, Catheter In/Out [[id number]] Collected: 02/24/22 2125    Lab Status: Final result Specimen: Urine, Catheter In/Out Updated: 02/25/22 2315     Urine Culture 50,000 CFU/mL Mixed Arnaldo Isolated    Narrative:      Specimen contains mixed organisms of questionable pathogenicity which indicates contamination with commensal arnaldo.  Further identification is unlikely to provide clinically useful information.  Suggest recollection.          Vancomycin Levels:              Assessment/Plan:    Pharmacy to dose vancomycin for sepsis/pneumonia.  Patient received a dose of vancomycin 750 mg IV x 1 on 3/5 @ 2205. SCr was elevated from baseline on admission at 0.97, though repeat improved to 0.49 mg/dL.   Will schedule vancomycin 750 mg IV every 12 hours and check trough 3/7 prior to the 4th total dose.   Monitor renal function, cultures and sensitivities, and clinical status, and adjust regimen as necessary.  Pharmacy will continue to follow.    Carmela Lizarraga, PharmD, BCPS  3/6/2022  15:28 EST

## 2022-03-06 NOTE — CONSULTS
Picc placed per em Antoine rn vabc  Will need cxr to confirm tip placement   Please page with results.

## 2022-03-07 ENCOUNTER — APPOINTMENT (OUTPATIENT)
Dept: CT IMAGING | Facility: HOSPITAL | Age: 67
End: 2022-03-07

## 2022-03-07 ENCOUNTER — APPOINTMENT (OUTPATIENT)
Dept: CARDIOLOGY | Facility: HOSPITAL | Age: 67
End: 2022-03-07

## 2022-03-07 ENCOUNTER — APPOINTMENT (OUTPATIENT)
Dept: GENERAL RADIOLOGY | Facility: HOSPITAL | Age: 67
End: 2022-03-07

## 2022-03-07 PROBLEM — R79.89 ELEVATED LACTIC ACID LEVEL: Status: RESOLVED | Noted: 2022-03-05 | Resolved: 2022-03-07

## 2022-03-07 LAB
ALBUMIN SERPL-MCNC: 2.5 G/DL (ref 3.5–5.2)
ANION GAP SERPL CALCULATED.3IONS-SCNC: 10 MMOL/L (ref 5–15)
ANION GAP SERPL CALCULATED.3IONS-SCNC: 10 MMOL/L (ref 5–15)
ANION GAP SERPL CALCULATED.3IONS-SCNC: 11 MMOL/L (ref 5–15)
ASCENDING AORTA: 3.2 CM
BACTERIA SPEC AEROBE CULT: NORMAL
BH CV ECHO MEAS - AI DEC SLOPE: 189.4 CM/SEC^2
BH CV ECHO MEAS - AI MAX PG: 54.4 MMHG
BH CV ECHO MEAS - AI MAX VEL: 368.7 CM/SEC
BH CV ECHO MEAS - AI P1/2T: 570 MSEC
BH CV ECHO MEAS - AO MAX PG (FULL): 17.7 MMHG
BH CV ECHO MEAS - AO MAX PG: 31.4 MMHG
BH CV ECHO MEAS - AO MEAN PG (FULL): 8.8 MMHG
BH CV ECHO MEAS - AO MEAN PG: 15.9 MMHG
BH CV ECHO MEAS - AO ROOT AREA: 6.8 CM^2
BH CV ECHO MEAS - AO ROOT DIAM: 2.9 CM
BH CV ECHO MEAS - AO V2 MAX: 280.2 CM/SEC
BH CV ECHO MEAS - AO V2 MEAN: 180.6 CM/SEC
BH CV ECHO MEAS - AO V2 VTI: 52.9 CM
BH CV ECHO MEAS - ASC AORTA: 3.2 CM
BH CV ECHO MEAS - AVA(I,A): 2.3 CM^2
BH CV ECHO MEAS - AVA(I,D): 2.3 CM^2
BH CV ECHO MEAS - AVA(V,A): 2.1 CM^2
BH CV ECHO MEAS - AVA(V,D): 2.1 CM^2
BH CV ECHO MEAS - EDV(CUBED): 32.4 ML
BH CV ECHO MEAS - EDV(MOD-SP2): 26 ML
BH CV ECHO MEAS - EDV(MOD-SP4): 35 ML
BH CV ECHO MEAS - EDV(TEICH): 40.6 ML
BH CV ECHO MEAS - EF(CUBED): 95.5 %
BH CV ECHO MEAS - EF(MOD-BP): 81 %
BH CV ECHO MEAS - EF(MOD-SP2): 76.9 %
BH CV ECHO MEAS - EF(MOD-SP4): 85.7 %
BH CV ECHO MEAS - EF(TEICH): 92.9 %
BH CV ECHO MEAS - ESV(CUBED): 1.5 ML
BH CV ECHO MEAS - ESV(MOD-SP2): 6 ML
BH CV ECHO MEAS - ESV(MOD-SP4): 5 ML
BH CV ECHO MEAS - ESV(TEICH): 2.9 ML
BH CV ECHO MEAS - FS: 64.5 %
BH CV ECHO MEAS - IVS/LVPW: 1.2
BH CV ECHO MEAS - IVSD: 1.5 CM
BH CV ECHO MEAS - LA DIMENSION: 3.8 CM
BH CV ECHO MEAS - LA/AO: 1.2
BH CV ECHO MEAS - LAD MAJOR: 6.2 CM
BH CV ECHO MEAS - LAT PEAK E' VEL: 10.3 CM/SEC
BH CV ECHO MEAS - LATERAL E/E' RATIO: 12.3
BH CV ECHO MEAS - LV MASS(C)D: 91.3 GRAMS
BH CV ECHO MEAS - LV MAX PG: 13.7 MMHG
BH CV ECHO MEAS - LV MEAN PG: 7.1 MMHG
BH CV ECHO MEAS - LV V1 MAX: 185.1 CM/SEC
BH CV ECHO MEAS - LV V1 MEAN: 120.5 CM/SEC
BH CV ECHO MEAS - LV V1 VTI: 36.9 CM
BH CV ECHO MEAS - LVIDD: 3.2 CM
BH CV ECHO MEAS - LVIDS: 1.1 CM
BH CV ECHO MEAS - LVLD AP2: 6.2 CM
BH CV ECHO MEAS - LVLD AP4: 6.4 CM
BH CV ECHO MEAS - LVLS AP2: 5.6 CM
BH CV ECHO MEAS - LVLS AP4: 5.1 CM
BH CV ECHO MEAS - LVOT AREA (M): 3.1 CM^2
BH CV ECHO MEAS - LVOT AREA: 3.2 CM^2
BH CV ECHO MEAS - LVOT DIAM: 2 CM
BH CV ECHO MEAS - LVPWD: 0.9 CM
BH CV ECHO MEAS - MED PEAK E' VEL: 6.5 CM/SEC
BH CV ECHO MEAS - MEDIAL E/E' RATIO: 19.5
BH CV ECHO MEAS - MR MAX PG: 123 MMHG
BH CV ECHO MEAS - MR MAX VEL: 551.8 CM/SEC
BH CV ECHO MEAS - MR MEAN PG: 79.9 MMHG
BH CV ECHO MEAS - MR MEAN VEL: 401.5 CM/SEC
BH CV ECHO MEAS - MR VTI: 143.4 CM
BH CV ECHO MEAS - MV DEC SLOPE: 454.3 CM/SEC^2
BH CV ECHO MEAS - MV DEC TIME: 0.1 SEC
BH CV ECHO MEAS - MV E MAX VEL: 128.5 CM/SEC
BH CV ECHO MEAS - MV MAX PG: 7 MMHG
BH CV ECHO MEAS - MV MEAN PG: 2.6 MMHG
BH CV ECHO MEAS - MV P1/2T MAX VEL: 127.2 CM/SEC
BH CV ECHO MEAS - MV P1/2T: 82 MSEC
BH CV ECHO MEAS - MV V2 MAX: 132.6 CM/SEC
BH CV ECHO MEAS - MV V2 MEAN: 82 CM/SEC
BH CV ECHO MEAS - MV V2 VTI: 29.4 CM
BH CV ECHO MEAS - MVA P1/2T LCG: 1.7 CM^2
BH CV ECHO MEAS - MVA(P1/2T): 2.7 CM^2
BH CV ECHO MEAS - MVA(VTI): 4.1 CM^2
BH CV ECHO MEAS - PA ACC SLOPE: 918.4 CM/SEC^2
BH CV ECHO MEAS - PA ACC TIME: 0.1 SEC
BH CV ECHO MEAS - PA MAX PG: 7.3 MMHG
BH CV ECHO MEAS - PA PR(ACCEL): 33.1 MMHG
BH CV ECHO MEAS - PA V2 MAX: 134.7 CM/SEC
BH CV ECHO MEAS - RAP SYSTOLE: 8 MMHG
BH CV ECHO MEAS - RVSP: 40 MMHG
BH CV ECHO MEAS - SV(AO): 361.8 ML
BH CV ECHO MEAS - SV(CUBED): 31 ML
BH CV ECHO MEAS - SV(LVOT): 119.7 ML
BH CV ECHO MEAS - SV(MOD-SP2): 20 ML
BH CV ECHO MEAS - SV(MOD-SP4): 30 ML
BH CV ECHO MEAS - SV(TEICH): 37.7 ML
BH CV ECHO MEAS - TAPSE (>1.6): 1.5 CM
BH CV ECHO MEAS - TR MAX PG: 32 MMHG
BH CV ECHO MEAS - TR MAX VEL: 282 CM/SEC
BH CV ECHO MEASUREMENTS AVERAGE E/E' RATIO: 15.3
BH CV VAS BP RIGHT ARM: NORMAL MMHG
BH CV XLRA - RV BASE: 2.6 CM
BH CV XLRA - RV LENGTH: 5.2 CM
BH CV XLRA - RV MID: 1.9 CM
BH CV XLRA - TDI S': 14 CM/SEC
BUN SERPL-MCNC: 19 MG/DL (ref 8–23)
BUN SERPL-MCNC: 20 MG/DL (ref 8–23)
BUN SERPL-MCNC: 23 MG/DL (ref 8–23)
BUN/CREAT SERPL: 52.8 (ref 7–25)
BUN/CREAT SERPL: 60.5 (ref 7–25)
BUN/CREAT SERPL: 62.5 (ref 7–25)
CALCIUM SPEC-SCNC: 6.7 MG/DL (ref 8.6–10.5)
CALCIUM SPEC-SCNC: 8.2 MG/DL (ref 8.6–10.5)
CALCIUM SPEC-SCNC: 8.5 MG/DL (ref 8.6–10.5)
CHLORIDE SERPL-SCNC: 109 MMOL/L (ref 98–107)
CHLORIDE SERPL-SCNC: 113 MMOL/L (ref 98–107)
CHLORIDE SERPL-SCNC: 116 MMOL/L (ref 98–107)
CO2 SERPL-SCNC: 19 MMOL/L (ref 22–29)
CO2 SERPL-SCNC: 20 MMOL/L (ref 22–29)
CO2 SERPL-SCNC: 23 MMOL/L (ref 22–29)
CREAT SERPL-MCNC: 0.32 MG/DL (ref 0.57–1)
CREAT SERPL-MCNC: 0.36 MG/DL (ref 0.57–1)
CREAT SERPL-MCNC: 0.38 MG/DL (ref 0.57–1)
DEPRECATED RDW RBC AUTO: 50.8 FL (ref 37–54)
DEPRECATED RDW RBC AUTO: 54 FL (ref 37–54)
EGFRCR SERPLBLD CKD-EPI 2021: 112.1 ML/MIN/1.73
EGFRCR SERPLBLD CKD-EPI 2021: 113.5 ML/MIN/1.73
EGFRCR SERPLBLD CKD-EPI 2021: 116.8 ML/MIN/1.73
ERYTHROCYTE [DISTWIDTH] IN BLOOD BY AUTOMATED COUNT: 15.2 % (ref 12.3–15.4)
ERYTHROCYTE [DISTWIDTH] IN BLOOD BY AUTOMATED COUNT: 15.3 % (ref 12.3–15.4)
GLUCOSE SERPL-MCNC: 125 MG/DL (ref 65–99)
GLUCOSE SERPL-MCNC: 142 MG/DL (ref 65–99)
GLUCOSE SERPL-MCNC: 880 MG/DL (ref 65–99)
HCT VFR BLD AUTO: 28.2 % (ref 34–46.6)
HCT VFR BLD AUTO: 31.5 % (ref 34–46.6)
HGB BLD-MCNC: 10.3 G/DL (ref 12–15.9)
HGB BLD-MCNC: 8.8 G/DL (ref 12–15.9)
LEFT ATRIUM VOLUME: 62 ML
LV EF 2D ECHO EST: 75 %
MAGNESIUM SERPL-MCNC: 1.7 MG/DL (ref 1.6–2.4)
MAGNESIUM SERPL-MCNC: 2.1 MG/DL (ref 1.6–2.4)
MAXIMAL PREDICTED HEART RATE: 156 BPM
MCH RBC QN AUTO: 29.6 PG (ref 26.6–33)
MCH RBC QN AUTO: 30.1 PG (ref 26.6–33)
MCHC RBC AUTO-ENTMCNC: 31.2 G/DL (ref 31.5–35.7)
MCHC RBC AUTO-ENTMCNC: 32.7 G/DL (ref 31.5–35.7)
MCV RBC AUTO: 90.5 FL (ref 79–97)
MCV RBC AUTO: 96.6 FL (ref 79–97)
PHOSPHATE SERPL-MCNC: 1.4 MG/DL (ref 2.5–4.5)
PHOSPHATE SERPL-MCNC: 1.7 MG/DL (ref 2.5–4.5)
PHOSPHATE SERPL-MCNC: 2.9 MG/DL (ref 2.5–4.5)
PLATELET # BLD AUTO: 152 10*3/MM3 (ref 140–450)
PLATELET # BLD AUTO: 185 10*3/MM3 (ref 140–450)
PMV BLD AUTO: 10.5 FL (ref 6–12)
PMV BLD AUTO: 10.5 FL (ref 6–12)
POTASSIUM SERPL-SCNC: 2.4 MMOL/L (ref 3.5–5.2)
POTASSIUM SERPL-SCNC: 2.9 MMOL/L (ref 3.5–5.2)
POTASSIUM SERPL-SCNC: 3.3 MMOL/L (ref 3.5–5.2)
RBC # BLD AUTO: 2.92 10*6/MM3 (ref 3.77–5.28)
RBC # BLD AUTO: 3.48 10*6/MM3 (ref 3.77–5.28)
SODIUM SERPL-SCNC: 142 MMOL/L (ref 136–145)
SODIUM SERPL-SCNC: 144 MMOL/L (ref 136–145)
SODIUM SERPL-SCNC: 145 MMOL/L (ref 136–145)
STRESS TARGET HR: 133 BPM
VANCOMYCIN SERPL-MCNC: 11.1 MCG/ML (ref 5–40)
WBC NRBC COR # BLD: 2.66 10*3/MM3 (ref 3.4–10.8)
WBC NRBC COR # BLD: 3.2 10*3/MM3 (ref 3.4–10.8)

## 2022-03-07 PROCEDURE — 25010000002 AMPICILLIN-SULBACTAM PER 1.5 G: Performed by: INTERNAL MEDICINE

## 2022-03-07 PROCEDURE — 0 DIATRIZOATE MEGLUMINE & SODIUM PER 1 ML: Performed by: INTERNAL MEDICINE

## 2022-03-07 PROCEDURE — 25010000002 ONDANSETRON PER 1 MG: Performed by: INTERNAL MEDICINE

## 2022-03-07 PROCEDURE — 25010000002 PIPERACILLIN SOD-TAZOBACTAM PER 1 G: Performed by: NURSE PRACTITIONER

## 2022-03-07 PROCEDURE — 86682 HELMINTH ANTIBODY: CPT | Performed by: NURSE PRACTITIONER

## 2022-03-07 PROCEDURE — 74178 CT ABD&PLV WO CNTR FLWD CNTR: CPT

## 2022-03-07 PROCEDURE — 84100 ASSAY OF PHOSPHORUS: CPT | Performed by: INTERNAL MEDICINE

## 2022-03-07 PROCEDURE — 80048 BASIC METABOLIC PNL TOTAL CA: CPT | Performed by: INTERNAL MEDICINE

## 2022-03-07 PROCEDURE — 93306 TTE W/DOPPLER COMPLETE: CPT

## 2022-03-07 PROCEDURE — 25010000002 VANCOMYCIN PER 500 MG

## 2022-03-07 PROCEDURE — 25010000002 CEFTRIAXONE PER 250 MG: Performed by: INTERNAL MEDICINE

## 2022-03-07 PROCEDURE — 85027 COMPLETE CBC AUTOMATED: CPT | Performed by: INTERNAL MEDICINE

## 2022-03-07 PROCEDURE — 0 POTASSIUM CHLORIDE PER 2 MEQ: Performed by: NURSE PRACTITIONER

## 2022-03-07 PROCEDURE — 25010000002 IOPAMIDOL 61 % SOLUTION: Performed by: INTERNAL MEDICINE

## 2022-03-07 PROCEDURE — 25010000002 ENOXAPARIN PER 10 MG

## 2022-03-07 PROCEDURE — 92610 EVALUATE SWALLOWING FUNCTION: CPT

## 2022-03-07 PROCEDURE — 93306 TTE W/DOPPLER COMPLETE: CPT | Performed by: INTERNAL MEDICINE

## 2022-03-07 PROCEDURE — 83735 ASSAY OF MAGNESIUM: CPT | Performed by: INTERNAL MEDICINE

## 2022-03-07 PROCEDURE — 80069 RENAL FUNCTION PANEL: CPT | Performed by: INTERNAL MEDICINE

## 2022-03-07 PROCEDURE — 80202 ASSAY OF VANCOMYCIN: CPT

## 2022-03-07 PROCEDURE — 71045 X-RAY EXAM CHEST 1 VIEW: CPT

## 2022-03-07 PROCEDURE — 99233 SBSQ HOSP IP/OBS HIGH 50: CPT | Performed by: INTERNAL MEDICINE

## 2022-03-07 PROCEDURE — 25010000002 HYDROCORTISONE SODIUM SUCCINATE 100 MG RECONSTITUTED SOLUTION: Performed by: NURSE PRACTITIONER

## 2022-03-07 PROCEDURE — 25010000002 METOCLOPRAMIDE PER 10 MG: Performed by: INTERNAL MEDICINE

## 2022-03-07 RX ORDER — DOCUSATE SODIUM 50 MG/5 ML
50 LIQUID (ML) ORAL 2 TIMES DAILY
Status: DISCONTINUED | OUTPATIENT
Start: 2022-03-07 | End: 2022-03-08

## 2022-03-07 RX ORDER — POTASSIUM CHLORIDE 29.8 MG/ML
20 INJECTION INTRAVENOUS
Status: DISCONTINUED | OUTPATIENT
Start: 2022-03-07 | End: 2022-03-11 | Stop reason: HOSPADM

## 2022-03-07 RX ORDER — ONDANSETRON 2 MG/ML
4 INJECTION INTRAMUSCULAR; INTRAVENOUS EVERY 6 HOURS PRN
Status: DISCONTINUED | OUTPATIENT
Start: 2022-03-07 | End: 2022-03-11 | Stop reason: HOSPADM

## 2022-03-07 RX ADMIN — SENNOSIDES AND DOCUSATE SODIUM 2 TABLET: 50; 8.6 TABLET ORAL at 08:20

## 2022-03-07 RX ADMIN — DEXTROSE AND SODIUM CHLORIDE 100 ML/HR: 5; 900 INJECTION, SOLUTION INTRAVENOUS at 13:33

## 2022-03-07 RX ADMIN — POTASSIUM CHLORIDE 20 MEQ: 400 INJECTION, SOLUTION INTRAVENOUS at 17:18

## 2022-03-07 RX ADMIN — DEXTROSE AND SODIUM CHLORIDE 100 ML/HR: 5; 900 INJECTION, SOLUTION INTRAVENOUS at 06:16

## 2022-03-07 RX ADMIN — SODIUM CHLORIDE 3 G: 900 INJECTION INTRAVENOUS at 23:10

## 2022-03-07 RX ADMIN — Medication 10 ML: at 20:45

## 2022-03-07 RX ADMIN — SUCRALFATE 1 G: 1 TABLET ORAL at 08:20

## 2022-03-07 RX ADMIN — HYDROCORTISONE SODIUM SUCCINATE 50 MG: 100 INJECTION, POWDER, FOR SOLUTION INTRAMUSCULAR; INTRAVENOUS at 13:40

## 2022-03-07 RX ADMIN — PANTOPRAZOLE SODIUM 40 MG: 40 INJECTION, POWDER, FOR SOLUTION INTRAVENOUS at 20:44

## 2022-03-07 RX ADMIN — PANTOPRAZOLE SODIUM 40 MG: 40 INJECTION, POWDER, FOR SOLUTION INTRAVENOUS at 08:20

## 2022-03-07 RX ADMIN — Medication 15 ML: at 17:19

## 2022-03-07 RX ADMIN — POTASSIUM PHOSPHATE, MONOBASIC POTASSIUM PHOSPHATE, DIBASIC 30 MMOL: 224; 236 INJECTION, SOLUTION, CONCENTRATE INTRAVENOUS at 08:20

## 2022-03-07 RX ADMIN — ONDANSETRON 4 MG: 2 INJECTION INTRAMUSCULAR; INTRAVENOUS at 15:13

## 2022-03-07 RX ADMIN — POLYETHYLENE GLYCOL 3350 17 G: 17 POWDER, FOR SOLUTION ORAL at 08:20

## 2022-03-07 RX ADMIN — ENOXAPARIN SODIUM 30 MG: 30 INJECTION SUBCUTANEOUS at 05:52

## 2022-03-07 RX ADMIN — Medication 10 ML: at 20:46

## 2022-03-07 RX ADMIN — Medication 10 ML: at 08:21

## 2022-03-07 RX ADMIN — SUCRALFATE 1 G: 1 TABLET ORAL at 13:37

## 2022-03-07 RX ADMIN — TAZOBACTAM SODIUM AND PIPERACILLIN SODIUM 3.38 G: 375; 3 INJECTION, SOLUTION INTRAVENOUS at 13:37

## 2022-03-07 RX ADMIN — SUCRALFATE 1 G: 1 TABLET ORAL at 20:45

## 2022-03-07 RX ADMIN — SODIUM CHLORIDE 2 G: 900 INJECTION INTRAVENOUS at 17:18

## 2022-03-07 RX ADMIN — IOPAMIDOL 100 ML: 612 INJECTION, SOLUTION INTRAVENOUS at 19:10

## 2022-03-07 RX ADMIN — Medication: at 13:33

## 2022-03-07 RX ADMIN — HYDROCORTISONE SODIUM SUCCINATE 50 MG: 100 INJECTION, POWDER, FOR SOLUTION INTRAMUSCULAR; INTRAVENOUS at 05:52

## 2022-03-07 RX ADMIN — VANCOMYCIN HYDROCHLORIDE 750 MG: 750 INJECTION, SOLUTION INTRAVENOUS at 00:05

## 2022-03-07 RX ADMIN — POTASSIUM CHLORIDE 20 MEQ: 400 INJECTION, SOLUTION INTRAVENOUS at 06:16

## 2022-03-07 RX ADMIN — TAZOBACTAM SODIUM AND PIPERACILLIN SODIUM 3.38 G: 375; 3 INJECTION, SOLUTION INTRAVENOUS at 05:53

## 2022-03-07 RX ADMIN — HYDROCORTISONE SODIUM SUCCINATE 50 MG: 100 INJECTION, POWDER, FOR SOLUTION INTRAMUSCULAR; INTRAVENOUS at 22:10

## 2022-03-07 RX ADMIN — SODIUM CHLORIDE 3 G: 900 INJECTION INTRAVENOUS at 17:18

## 2022-03-07 RX ADMIN — METOCLOPRAMIDE 5 MG: 5 INJECTION, SOLUTION INTRAMUSCULAR; INTRAVENOUS at 08:20

## 2022-03-07 RX ADMIN — POTASSIUM CHLORIDE 20 MEQ: 400 INJECTION, SOLUTION INTRAVENOUS at 22:33

## 2022-03-07 NOTE — PLAN OF CARE
Goal Outcome Evaluation:  Plan of Care Reviewed With: patient    SLP evaluation completed. Will address dysphagia . Please see note for further details and recommendations.

## 2022-03-07 NOTE — CASE MANAGEMENT/SOCIAL WORK
Continued Stay Note  [facility]     Patient Name: Karen Larkin  MRN: [id number]  Today's Date: 3/7/2022    Admit Date: 3/5/2022     Discharge Plan     Row Name 03/07/22 0813       Plan    Plan update    Plan Comments CM received call from April at [facility] and per [facility] Son[facility] MANZANARES patient is not appropriate to return to [facility] at discharge.    Final Discharge Disposition Code 30 - still a patient               Discharge Codes    No documentation.                     Payton Ackerman RN

## 2022-03-07 NOTE — PROGRESS NOTES
"INTENSIVIST NOTE     Hospital Day: 2    Ms. Karen Larkin, 66 y.o. female is followed for:   Sepsis and pneumonia       SUBJECTIVE     Interval history:    Awake alert and oriented.  Maximum temperature 99.2.  Fluid balance +1.4 L.  Remains on D5 normal saline at 100 mL/hour.  Electrolytes have been replaced.  Bowel regimen has been initiated.    The patient's relevant past medical, surgical and social history were reviewed and updated in Epic as appropriate.       OBJECTIVE     Vital Sign Min/Max for last 24 hours  Temp  Min: 97.4 °F (36.3 °C)  Max: 99.2 °F (37.3 °C)   BP  Min: 96/68  Max: 125/105   Pulse  Min: 97  Max: 125   Resp  Min: 14  Max: 24   SpO2  Min: 89 %  Max: 100 %   No data recorded   No data recorded      Intake/Output Summary (Last 24 hours) at 3/7/2022 1612  Last data filed at 3/7/2022 1337  Gross per 24 hour   Intake 2509 ml   Output 1100 ml   Net 1409 ml      Flowsheet Rows    Flowsheet Row First Filed Value   Admission Height 142.2 cm (56\") Documented at 03/05/2022 1858   Admission Weight 42.6 kg (94 lb) Documented at 03/05/2022 1858             03/05/22  1858 03/06/22  0044   Weight: 42.6 kg (94 lb) 46.2 kg (101 lb 13.6 oz)            Objective:  General Appearance:  In no acute distress and ill-appearing.    Vital signs: (most recent): Blood pressure 108/80, pulse 98, temperature 97.4 °F (36.3 °C), temperature source Axillary, resp. rate 14, height 142.2 cm (56\"), weight 46.2 kg (101 lb 13.6 oz), SpO2 95 %.    HEENT: Normal HEENT exam.  (Nasal cannula O2  NG)    Lungs:  Normal effort and normal respiratory rate.  She is not in respiratory distress.  There are rales, decreased breath sounds and rhonchi.  No wheezes.    Heart: Normal rate.  Regular rhythm.  S1 normal and S2 normal.  No murmur, gallop or friction rub.   Chest: Symmetric chest wall expansion.   Abdomen: Abdomen is soft and non-distended.  Bowel sounds are normal.   There is no abdominal tenderness.   There is no mass. There is no " splenomegaly. There is no hepatomegaly.   Extremities: There is no deformity or dependent edema.    Neurological: Patient is alert and oriented to person, place and time.    Pupils:  Pupils are equal, round, and reactive to light.    Skin:  Warm and dry.              I reviewed the patient's new clinical results.  I reviewed the patient's new imaging results/reports including actual images and agree with reports.    XR Chest 1 View    Result Date: 3/7/2022  1. Placement of esophagogastric tube within the patient's large hiatal hernia/intrathoracic stomach. 2. Stable right upper extremity PICC line. 3. Patchy multifocal airspace disease throughout the lungs concerning for pneumonia, stable. 4. No pneumothorax.  This report was finalized on 3/7/2022 7:05 AM by Alex Roe MD.      XR Chest 1 View    Result Date: 3/6/2022  1.  The right PICC line distal tip partially extends into the right atrium. Recommend retraction of the line by approximately 2 cm. 2.  Stable multifocal airspace infiltrates bilaterally.  This report was finalized on 3/6/2022 11:20 AM by Kemar Armijo MD.      XR Chest 1 View    Result Date: 3/5/2022  Low lung volumes. There is cardiomegaly with vascular congestion. There are bilateral perihilar airspace opacities that likely represent edema, less likely pneumonia  This report was finalized on 3/5/2022 7:51 PM by Terrell Marx.      CT Angiogram Chest    Result Date: 3/5/2022  1. No evidence of pulmonary embolism. 2. Patchy consolidative airspace disease in both lungs, with near complete consolidation of the lower lobes. Findings likely represent pneumonia. 3. Large hiatal hernia with intrathoracic stomach. The esophagus is also filled with fluid, suggesting gastroesophageal reflux. 4. Cardiomegaly and coronary artery atherosclerosis. 5. Chronic changes below the diaphragm involving the liver and spleen. Electronically signed by:  Tay Funez M.D.  3/5/2022 7:30 PM Mountain Time    XR Abdomen  KUB    Result Date: 3/6/2022   1. Persistent nonspecific dilatation of bowel loops in the left upper quadrant and left abdomen. 2. There is excreted contrast material remaining in the right renal collecting system and bladder. This may indicate renal dysfunction given the length of time since patient's contrasted study 8:00 PM last night. Correlate clinically.  This report was finalized on 3/6/2022 1:44 PM by Brennen Garcia MD.         INFUSIONS  dextrose 5 % and sodium chloride 0.9 %, 100 mL/hr, Last Rate: 100 mL/hr (03/07/22 1333)  Pharmacy Consult,         Results from last 7 days   Lab Units 03/07/22  0432 03/07/22  0307 03/06/22  1257   WBC 10*3/mm3 3.20* 2.66* 4.83   HEMOGLOBIN g/dL 10.3* 8.8* 11.8*   HEMATOCRIT % 31.5* 28.2* 35.8   PLATELETS 10*3/mm3 185 152 232     Results from last 7 days   Lab Units 03/07/22  0432 03/07/22  0307 03/06/22  1257   SODIUM mmol/L 142 145 147*   POTASSIUM mmol/L 2.9* 2.4* 3.5   CHLORIDE mmol/L 109* 116* 114*   CO2 mmol/L 23.0 19.0* 21.0*   BUN mg/dL 23 19 31*   GLUCOSE mg/dL 142* 880* 129*   CREATININE mg/dL 0.38* 0.36* 0.49*   MAGNESIUM mg/dL 2.1 1.7  --    CALCIUM mg/dL 8.5* 6.7* 8.6         Results from last 7 days   Lab Units 03/05/22  2242   PH, ARTERIAL pH units 7.450   PCO2, ARTERIAL mm Hg 31.1*   PO2 ART mm Hg 93.9   FIO2 % 80         Mechanical Ventilator:   Settings: Observed:                                                I reviewed the patient's medications.    Assessment/Plan   ASSESSMENT/PLAN     Active Hospital Problems    Diagnosis    • **Severe sepsis (HCC)    • Aspiration pneumonia (HCC)    • Acute respiratory failure with hypoxia (HCC)    • Elevated brain natriuretic peptide (BNP) level    • Elevated BUN    • Dehydration    • Elevated alkaline phosphatase level    • Serum total bilirubin elevated    • Leukopenia    • PAF (paroxysmal atrial fibrillation) (HCC)      On Xarelto     • Rheumatoid arthritis      On chronic Prednisone     • Hiatal hernia with  gastroesophageal reflux        66-year-old female with a past medical history that includes RA on chronic prednisone, PAF on Xarelto, hypertension, dyslipidemia, GERD, iron deficiency, status post recent bladder prolapse repair at [facility], and anemia.  She was at [facility] and was sent to our ER for shortness of breath and generalized weakness.  She has had several recent admissions including admission here recently for hematemesis in which she was found to have a large paraesophageal hernia as well as erosive gastric reflux disease.  She underwent CT of the chest which revealed no PE but significant consolidation of both lower lobes with debris in the esophagus.  She was given vancomycin and Zosyn and sepsis bolus protocol.    Blood cultures have subsequently grown Enterococcus faecalis with sensitivities pending.    Enterococcus faecalis sepsis  · ID consultation.  Discussed with Dr. Art  · Continue vancomycin and Zosyn for now  · Rule out endocarditis initially with TTE and possibly KHARI  Aspiration pneumonia  · Continue current antibiotics  · Await any further cultures  Large hiatal hernia and reflux  · Possible cause of aspiration pneumonia  Rheumatoid arthritis on chronic steroids  · Stress steroids  · Immunosuppressed     I discussed the patient's findings and my recommendations with patient, nursing staff and consulting provider     Plan of care and goals reviewed with multidisciplinary team at daily rounds.    High level of risk due to:  illness with threat to life or bodily function.    Vikram Benitez MD  Pulmonary and Critical Care Medicine  03/07/22 16:12 EST

## 2022-03-07 NOTE — CASE MANAGEMENT/SOCIAL WORK
Discharge Planning Assessment  [facility]     Patient Name: Karen Larkin  MRN: [id number]  Today's Date: 3/7/2022    Admit Date: 3/5/2022     Discharge Needs Assessment     Row Name 03/07/22 1044       Living Environment    People in Home spouse;child(randa), adult  She had spent one night at [facility] before being readmitted to hospital    Current Living Arrangements extended care facility    Primary Care Provided by child(randa);spouse/significant other    Provides Primary Care For no one, unable/limited ability to care for self    Family Caregiver if Needed friend(s)    Quality of Family Relationships unable to assess    Able to Return to Prior Arrangements no    Living Arrangement Comments [facility] physician said she was not appropriate for readmission there.       Transition Planning    Patient/Family Anticipates Transition to inpatient rehabilitation facility;home with family    Patient/Family Anticipated Services at Transition skilled nursing;home health care    Transportation Anticipated family or friend will provide;health plan transportation       Discharge Needs Assessment    Readmission Within the Last 30 Days current reason for admission unrelated to previous admission    Equipment Currently Used at Home cane, straight;rollator;other (see comments)  ramp    Anticipated Changes Related to Illness inability to care for self    Discharge Facility/Level of Care Needs home with home health;nursing facility, skilled    Current Discharge Risk chronically ill;physical impairment;dependent with mobility/activities of daily living               Discharge Plan     Row Name 03/07/22 1049       Plan    Plan Initial    Patient/Family in Agreement with Plan yes    Plan Comments I spoke with Mrs Larkin in room.  She lives in [place] with her spouse and adulty daughter.  She had stayed one night at [facility] before being readmitted to [facility].  She is dependent for ADL's at current.  Previously at home she  was able to mobilize with a cane and rollator plus she had a ramp into her house.  She is not current with home health or physical therapy.  She does not have a living will or POA on file.  She has prescription coverage for her medications.  She will most likely need a skilled nursing facility at discharge or home with home health.  CM following.    Row Name 03/07/22 0813       Plan    Plan update    Plan Comments CM received call from April at [facility] and per [facility] MANZANARES patient is not appropriate to return to [facility] at discharge.    Final Discharge Disposition Code 30 - still a patient              Continued Care and Services - Admitted Since 3/5/2022    Coordination has not been started for this encounter.     Selected Continued Care - Prior Encounters Includes selections from prior encounters from 12/5/2021 to 3/7/2022    Discharged on 3/4/2022 Admission date: 2/24/2022 - Discharge disposition: Rehab Facility or Unit (DC - External)    Destination     Service Provider Selected Services Address Phone Fax Patient Preferred    [facility]  Inpatient Rehabilitation [address] [facility] [id number] [phone] --                    Expected Discharge Date and Time     Expected Discharge Date Expected Discharge Time    Mar 14, 2022          Demographic Summary     Row Name 03/07/22 1043       General Information    Admission Type inpatient    Reason for Consult discharge planning    Preferred Language English               Functional Status     Row Name 03/07/22 1044       Functional Status    Usual Activity Tolerance fair    Current Activity Tolerance poor       Functional Status, IADL    Medications completely dependent    Meal Preparation completely dependent    Housekeeping completely dependent    Laundry completely dependent    Shopping completely dependent               Psychosocial    No documentation.                Abuse/Neglect    No  documentation.                Legal    No documentation.                Substance Abuse    No documentation.                Patient Forms    No documentation.                   Payton Ackerman RN

## 2022-03-07 NOTE — CONSULTS
INFECTIOUS DISEASE CONSULT/INITIAL HOSPITAL VISIT    Karen Larkin  1955  [id number]    Date of Consult: 3/7/2022    Admission Date: 3/5/2022      Requesting Provider: No ref. provider found  Evaluating Physician: Clovis Art MD    Reason for Consultation: Bacteremia     History of present illness:    Patient is a 66 y.o. female, with PMH RA on Prednisone, large HH, autoimmune heapatitis, GERD, VHD, afib  diastolic heart failure, PCKD, seen today for Enterococcal bacteremia.  She underwent recent bladder prolapse surgery at [facility], transferred to [facility] for rehab.  Readmitted here 2/24- 3/4 with upper GI bleed, undergoing EGD which revealed complicated reflux disease, reflux esophagitis, type IV paraesophageal hernia with 3/4 of her stomach in the intrathoracic cavity. Developed nausea, vomiting, hypotension, and was transferred back to [facility] and returned 3/5 with shortness of breath, hypotension.  CTA of chest with patchy consolidative airspace disease of both lungs, with complete consolidation lower lobes.  Tmax of 99.2. Admitting labs with PCT 1.8, lactate 2.1, WBC 1.8, Sc r 0.97, Alk [id number], UA with 6-12 WBC, UC no growth.  Blood cultures now positive for E. Faecalis, not VRE, and we were consulted for evaluation and treatment. Currently on Vancomycin and Zosyn.    Review of her records at [facility] revealed an echocardiogram demonstrating aortic and mitral valvular calcifications and significant mitral valve disease with MR.      Past Medical History:   Diagnosis Date   • Anemia    • Arthritis     Rheumatoid   • GERD (gastroesophageal reflux disease)    • Hiatal hernia    • Liver disease    • Murmur    • Rheumatoid arthritis (HCC)    • Varicose veins of both lower extremities        Past Surgical History:   Procedure Laterality Date   • ENDOSCOPY N/A 2/24/2022    Procedure: ESOPHAGOGASTRODUODENOSCOPY;  Surgeon: Brunner, Mark I, MD;  Location: [facility] ENDOSCOPY;  Service:  Gastroenterology;  Laterality: N/A;   • HERNIA REPAIR     • HIP SURGERY Right    • SHOULDER SURGERY Left        Family History   Problem Relation Age of Onset   • Heart disease Mother    • Cancer Mother         Colon   • Stroke Mother    • Cancer Father         Esophageal   • Stroke Father    • Cancer Sister         Colon       Social History     Socioeconomic History   • Marital status:    Tobacco Use   • Smoking status: Never Smoker   • Smokeless tobacco: Never Used   Substance and Sexual Activity   • Alcohol use: Never   • Drug use: Never   • Sexual activity: Defer       No Known Allergies      Medication:    Current Facility-Administered Medications:   •  ampicillin-sulbactam 3 g/100 mL 0.9% NS (MBP), 3 g, Intravenous, Q6H, Clovis Art MD  •  bisacodyl (DULCOLAX) EC tablet 5 mg, 5 mg, Oral, Daily PRN, Case, Karen V., DO  •  bisacodyl (DULCOLAX) suppository 10 mg, 10 mg, Rectal, Daily, Case, Karen V., DO  •  cefTRIAXone (ROCEPHIN) 2 g/100 mL 0.9% NS IVPB (MBP), 2 g, Intravenous, Q12H, Clovis Art MD  •  dextrose 5 % and sodium chloride 0.9 % infusion, 100 mL/hr, Intravenous, Continuous, Nereida Estrada APRN, Last Rate: 100 mL/hr at 03/07/22 1333, 100 mL/hr at 03/07/22 1333  •  docusate sodium (COLACE) liquid 50 mg, 50 mg, Oral, BID, Vikram Benitez MD  •  enoxaparin (LOVENOX) syringe 30 mg, 30 mg, Subcutaneous, Q24H, Rod Cheema IV, PharmD, 30 mg at 03/07/22 0552  •  hydrocortisone sodium succinate (Solu-CORTEF) injection 50 mg, 50 mg, Intravenous, Q8H, Nereida Estrada APRN, 50 mg at 03/07/22 1340  •  metoclopramide (REGLAN) injection 5 mg, 5 mg, Intravenous, Q6H PRN, Zenon Ramirez MD, 5 mg at 03/07/22 0820  •  ondansetron (ZOFRAN) injection 4 mg, 4 mg, Intravenous, Q6H PRN, Vikram Benitez MD  •  pantoprazole (PROTONIX) injection 40 mg, 40 mg, Intravenous, Q12H, Case, Karen V., DO, 40 mg at 03/07/22 0820  •  Pharmacy to dose vancomycin, , Does not apply,  Continuous PRN, Nereida Estrada APRN  •  polyethylene glycol (MIRALAX) packet 17 g, 17 g, Oral, Daily PRN, Case, Karen V., DO, 17 g at 03/07/22 0820  •  potassium chloride 20 mEq in 50 mL IVPB, 20 mEq, Intravenous, Q1H PRN, Nereida Estrada APRN, Last Rate: 50 mL/hr at 03/07/22 0616, 20 mEq at 03/07/22 0616  •  potassium phosphate 45 mmol in sodium chloride 0.9 % 250 mL infusion, 45 mmol, Intravenous, PRN **OR** potassium phosphate 30 mmol in sodium chloride 0.9 % 100 mL infusion, 30 mmol, Intravenous, PRN, Last Rate: 25 mL/hr at 03/07/22 0820, 30 mmol at 03/07/22 0820 **OR** potassium phosphate 15 mmol in sodium chloride 0.9 % 100 mL infusion, 15 mmol, Intravenous, PRN **OR** sodium phosphates 45 mmol in sodium chloride 0.9 % 250 mL IVPB, 45 mmol, Intravenous, PRN **OR** sodium phosphates 30 mmol in sodium chloride 0.9 % 100 mL IVPB, 30 mmol, Intravenous, PRN **OR** sodium phosphates 15 mmol in sodium chloride 0.9 % 100 mL IVPB, 15 mmol, Intravenous, PRN, Nereida Estrada APRN  •  sennosides (SENOKOT) 8.8 MG/5ML syrup 5 mL, 5 mL, Oral, BID, Vikram Benitez MD  •  sodium chloride 0.9 % flush 10 mL, 10 mL, Intravenous, Q12H, Nereida Estrada APRN, 10 mL at 03/07/22 0821  •  sodium chloride 0.9 % flush 10 mL, 10 mL, Intravenous, PRN, Nereida Estrada APRN  •  sodium chloride 0.9 % flush 10 mL, 10 mL, Intravenous, Q12H, Zenon Ramirez MD, 10 mL at 03/06/22 2009  •  sodium chloride 0.9 % flush 10 mL, 10 mL, Intravenous, PRN, Zenon Ramirez MD  •  sodium chloride 0.9 % flush 20 mL, 20 mL, Intravenous, PRN, Zenon Ramirez MD  •  sucralfate (CARAFATE) tablet 1 g, 1 g, Oral, 4x Daily AC & at Bedtime, Case, Karen V., DO, 1 g at 03/07/22 1337    Antibiotics:  Anti-Infectives (From admission, onward)    Ordered     Dose/Rate Route Frequency Start Stop    03/07/22 1422  ampicillin-sulbactam 3 g/100 mL 0.9% NS (MBP)        Ordering Provider: Clovis Art MD    3 g Intravenous Every 6 Hours 03/07/22 1800  22 1759    22 1422  cefTRIAXone (ROCEPHIN) 2 g/100 mL 0.9% NS IVPB (MBP)        Ordering Provider: Clovis Art MD    2 g  over 30 Minutes Intravenous Every 12 Hours 22 1800 22 1759    22 1526  vancomycin in dextrose 5% 150 mL (VANCOCIN) IVPB 750 mg        Ordering Provider: Carmela Lizarraga PharmD    750 mg  over 60 Minutes Intravenous Once 22 1615 22 1701    22  piperacillin-tazobactam (ZOSYN) 3.375 g in iso-osmotic dextrose 50 ml (premix)        Ordering Provider: Grant Crouch PA    3.375 g  over 30 Minutes Intravenous Once 22 2151    22  vancomycin in dextrose 5% 150 mL (VANCOCIN) IVPB 750 mg        Ordering Provider: Grant Crouch PA    20 mg/kg × 42.6 kg Intravenous Once 22 2346            Review of Systems:  Constitutional-- No Fever, chills or sweats.  Appetite good, and no malaise. No fatigue.  HEENT-- No new vision, hearing or throat complaints.  No epistaxis or oral sores.  Denies odynophagia or dysphagia. No headache, photophobia or neck stiffness.  CV-- No chest pain, palpitation or syncope  Resp-- +  SOB/cough  GI- + nausea, vomiting, + diarrhea.  No hematochezia, melena, or hematemesis. Denies jaundice or chronic liver disease.  -- No dysuria, hematuria, or flank pain.  Denies hesitancy, urgency or flank pain.  Lymph- no swollen lymph nodes in neck/axilla or groin.   Heme- No active bruising or bleeding; no Hx of DVT or PE.  MS-- no swelling or pain in the bones or joints of arms/legs.  No new back pain.  Neuro-- No acute focal weakness or numbness in the arms or legs.  No seizures.  Skin--No rashes or lesions      Physical Exam:   Vital Signs  Temp (24hrs), Av.6 °F (37 °C), Min:97.4 °F (36.3 °C), Max:99.2 °F (37.3 °C)    Temp  Min: 97.4 °F (36.3 °C)  Max: 99.2 °F (37.3 °C)  BP  Min: 94/71  Max: 125/105  Pulse  Min: 97  Max: 125  Resp  Min: 14  Max: 24  SpO2  Min: 89 %  Max:  100 %    GENERAL: Awake and alert, in no acute distress.  She is frail and ill-appearing  HEENT: Normocephalic, atraumatic.  PERRL. EOMI. No conjunctival injection. No icterus. Oropharynx clear without evidence of thrush or exudate.   NECK: Supple without nuchal rigidity. No mass.  LYMPH: No cervical, axillary or inguinal lymphadenopathy.  HEART: RRR; 3/6 murmur, rubs, gallops.   LUNGS: Bilateral rhonchi  ABDOMEN: Soft, nontender, nondistended. Positive bowel sounds. No rebound or guarding. NO mass or HSM.  EXT: She has multiple cyanotic digits on her hands and to a lesser extent on her feet.  RA deformities of joints   :  purewick   MSK:  No joint effusions   SKIN: Warm and dry numerous bruises upper extremities.  Left pretibial wound with scabbing.   NEURO: Oriented to PPT.motor strength 5/5  PSYCHIATRIC: Normal insight and judgement. Cooperative with PE    Laboratory Data    Results from last 7 days   Lab Units 03/07/22  0432 03/07/22  0307 03/06/22  1257   WBC 10*3/mm3 3.20* 2.66* 4.83   HEMOGLOBIN g/dL 10.3* 8.8* 11.8*   HEMATOCRIT % 31.5* 28.2* 35.8   PLATELETS 10*3/mm3 185 152 232     Results from last 7 days   Lab Units 03/07/22  0432   SODIUM mmol/L 142   POTASSIUM mmol/L 2.9*   CHLORIDE mmol/L 109*   CO2 mmol/L 23.0   BUN mg/dL 23   CREATININE mg/dL 0.38*   GLUCOSE mg/dL 142*   CALCIUM mg/dL 8.5*     Results from last 7 days   Lab Units 03/05/22  1934   ALK PHOS U/L 245*   BILIRUBIN mg/dL 2.0*   ALT (SGPT) U/L 8   AST (SGOT) U/L 19             Results from last 7 days   Lab Units 03/06/22  1257   LACTATE mmol/L 1.7         Results from last 7 days   Lab Units 03/07/22  1320   VANCOMYCIN RM mcg/mL 11.10     Estimated Creatinine Clearance: 109.1 mL/min (A) (by C-G formula based on SCr of 0.38 mg/dL (L)).      Microbiology:  Blood Culture   Date Value Ref Range Status   03/05/2022 Gram Positive Cocci (C)  Preliminary     BCID, PCR   Date Value Ref Range Status   03/05/2022 (A) Negative by BCID PCR. Culture  to Follow. Final    Enterococcus faecalis. Ashley/B (vancomycin resistance gene) not detected. Identification by BCID2 PCR.     No results found for: CULTURES, HSVCX, URCX  No results found for: EYECULTURE, GCCX, HSVCULTURE, LABHSV  No results found for: LEGIONELLA, MRSACX, MUMPSCX, MYCOPLASCX  No results found for: NOCARDIACX, STOOLCX  Urine Culture   Date Value Ref Range Status   03/05/2022 No growth  Preliminary     No results found for: VIRALCULTU, WOUNDCX        Radiology:  Imaging Results (Last 72 Hours)     Procedure Component Value Units Date/Time    XR Chest 1 View [[id number]] Collected: 03/07/22 0703     Updated: 03/07/22 0708    Narrative:         DATE OF EXAM:   3/7/2022 4:10 AM     PROCEDURE:   XR CHEST 1 VW-     INDICATIONS:   f/u respiratory illness.; J18.9-Pneumonia, unspecified organism;  I48.91-Unspecified atrial fibrillation; A41.9-Sepsis, unspecified  organism; I95.9-Hypotension, unspecified; R09.02-Hypoxemia     COMPARISON:  03/06/2022      TECHNIQUE:   Portable chest radiograph.     FINDINGS:   Placement of an esophagogastric tube with the tip within the patient's  large hiatal hernia/intrathoracic stomach. There is a right upper  extremity PICC line with the tip at the low SVC. Multifocal patchy  airspace disease throughout the lungs unchanged likely relating to  pneumonia. No pneumothorax. No large effusion. Plate and screw fixation  noted at the left proximal ureters.       Impression:      1. Placement of esophagogastric tube within the patient's large hiatal  hernia/intrathoracic stomach.  2. Stable right upper extremity PICC line.  3. Patchy multifocal airspace disease throughout the lungs concerning  for pneumonia, stable.  4. No pneumothorax.     This report was finalized on 3/7/2022 7:05 AM by Alex Roe MD.       XR Abdomen KUB [[id number]] Collected: 03/06/22 1339     Updated: 03/06/22 1410    Narrative:      DATE OF EXAM: 3/6/2022 1:14 PM     PROCEDURE: XR ABDOMEN KUB-      INDICATIONS: vomiting, tube placement; J18.9-Pneumonia, unspecified  organism; I48.91-Unspecified atrial fibrillation; A41.9-Sepsis,  unspecified organism; I95.9-Hypotension, unspecified; R09.02-Hypoxemia     COMPARISON: 3/2/2020     TECHNIQUE: Single radiographic view of the abdomen was obtained.     FINDINGS:  There is persistent dilatation of bowel loops in the left abdomen and  upper quadrant. No pneumatosis is identified. No gross evidence of free  air is seen. There is excreted contrast material within the right renal  collecting system and urinary bladder. Atherosclerotic vascular  calcification is noted diffusely. There is degenerative change in the  spine. Severe degenerative change in the left hip. Right hip lag screw  and IM orin incidentally noted..        Impression:         1. Persistent nonspecific dilatation of bowel loops in the left upper  quadrant and left abdomen.  2. There is excreted contrast material remaining in the right renal  collecting system and bladder. This may indicate renal dysfunction given  the length of time since patient's contrasted study 8:00 PM last night.  Correlate clinically.     This report was finalized on 3/6/2022 1:44 PM by Brennen Garcia MD.       XR Chest 1 View [[id number]] Collected: 03/06/22 1119     Updated: 03/06/22 1123    Narrative:         DATE OF EXAM:   3/6/2022 11:16 AM     PROCEDURE:   XR CHEST 1 VW-     INDICATIONS:   picc placement; J18.9-Pneumonia, unspecified organism;  I48.91-Unspecified atrial fibrillation; A41.9-Sepsis, unspecified  organism; I95.9-Hypotension, unspecified; R09.02-Hypoxemia     COMPARISON:  03/05/2022 at 7:19 PM     TECHNIQUE:   [Portable chest radiograph]     FINDINGS:   Multifocal infiltrates are again seen throughout the lungs bilaterally.  These changes are stable. A large hiatal hernia is again noted. The  cardiac silhouette and mediastinum are stable. A right PICC line is  noted. The distal tip partially extends into the right  atrium. Recommend  retraction of the line by approximately 2 cm.        Impression:      1.  The right PICC line distal tip partially extends into the right  atrium. Recommend retraction of the line by approximately 2 cm.  2.  Stable multifocal airspace infiltrates bilaterally.     This report was finalized on 3/6/2022 11:20 AM by Kemar Armijo MD.       CT Angiogram Chest [[id number]] Collected: 03/05/22 2124     Updated: 03/05/22 2131    Narrative:      CT ANGIOGRAM OF THE CHEST    INDICATION: Hypotension, leukopenia, shortness of breath    TECHNIQUE: CT scan of the chest was performed following the administration of 80 mL Isovue-370 intravenous contrast. Imaging was performed to optimize evaluation of the pulmonary arterial system. CT dose lowering techniques were used, to include:   automated exposure control, adjustment for patient size, and / or use of iterative reconstruction. MIP images were also generated.    COMPARISON: 2/24/2022    FINDINGS:  Vasculature: There are no filling defects in the central, lobar, proximal segmental or distal segmental pulmonary arteries. There is no evidence of right heart strain or pulmonary infarction. Coronary artery calcifications are present. The heart is   enlarged.    Mediastinum / Pleura: There is no pericardial or pleural effusion. There is no mediastinal, hilar or axillary lymphadenopathy. A large hiatal hernia is present, with intrathoracic stomach. The esophagus is filled with fluid along its length.    Airways / Lungs: The central airways are patent.  There is no pneumothorax. There is near complete consolidation of both lower lobes. Patchy airspace disease is present within the aerated upper lobes and right middle lobe.    Bones: No destructive osseous lesion is identified. A chronic L1 deformity is again seen.    Upper Abdomen: Numerous cystic lesions are present in the liver. Calcified lesions are also seen. A cystic lesion is also present in the spleen.       Impression:        1. No evidence of pulmonary embolism.   2. Patchy consolidative airspace disease in both lungs, with near complete consolidation of the lower lobes. Findings likely represent pneumonia.  3. Large hiatal hernia with intrathoracic stomach. The esophagus is also filled with fluid, suggesting gastroesophageal reflux.  4. Cardiomegaly and coronary artery atherosclerosis.  5. Chronic changes below the diaphragm involving the liver and spleen.    Electronically signed by:  Tay Funez M.D.    3/5/2022 7:30 PM Mountain Time    XR Chest 1 View [[id number]] Collected: 03/05/22 1950     Updated: 03/05/22 1954    Narrative:      DATE OF EXAM: 3/5/2022 7:20 PM     PROCEDURE: XR CHEST 1 VW-     INDICATIONS: SOA triage protocol     COMPARISON: 2/22/2022     TECHNIQUE: Single radiographic AP view of the chest was obtained.     FINDINGS:  Low lung volumes. Large hiatal hernia which partially obscures the heart  border. There is probable cardiomegaly. Central pulmonary vessels are  prominent. There are perihilar airspace opacities bilaterally.        Impression:      Low lung volumes. There is cardiomegaly with vascular congestion. There  are bilateral perihilar airspace opacities that likely represent edema,  less likely pneumonia     This report was finalized on 3/5/2022 7:51 PM by Terrell Marx.               Impression:   1.  Enterococcal bacteremia-I am concerned about potential enterococcal endocarditis since she has underlying significant valvular heart disease.  This is not due to pneumonia as Enterococcus does not cause pneumonia.  UTI and intra-abdominal infection would be other potential sources.  She does not appear to have biliary disease as a source.  She clearly has aspiration pneumonia but as noted above this does not explain the enterococcal bacteremia.  I suspect she has both aspiration pneumonia and possible underlying endocarditis.  I will readjust her antibiotic regimen to intravenous Unasyn  and ceftriaxone pending further evaluation.  Strongyloides hyper-infection syndrome would be another potential source for enterococcal bacteremia although she was only on 5 mg of prednisone which is not generally enough immunosuppression to result in strongyloides hyper-infection syndrome.  We will check a strongyloidiasis antibody.  If a duodenal feeding tube is placed, I would like to have a duodenal aspirate sent for ova and parasite exam.  2.  Aspiration pneumonia   3.  Recent bladder prolapse surgery- plates and screws noted on Xray left  proximal uteters  4.  Large Hiatal hernia- with 3/4 of stomach in intrathoracic cavity   5.  Valvular heart disease-she has significant valvular heart disease with a loud murmur.  We will need to evaluate for endocarditis as noted above.  6.  Rheumatoid arthritis-on chronic prednisone therapy   7.  Polycystic kidney and liver disease  8.  Atrial fibrillation  9.  Leukopenia   10.  Immunocompromised host   11.  Pyuria/? UTI  12.  Acute hypoxic respiratory failure-improving    PLAN/RECOMMENDATIONS:   Thank you for asking us to see Karen Trae, I recommend the followin.  Obtain [facility] records from recent surgery   2.  CT A/P with contrast   3.  Repeat blood cultures  4.  Discontinue vancomycin  5.  Change Zosyn to Unasyn  6.  Ceftriaxone 2 g IV every 12 hours for synergy with the ampicillin since she may have underlying endocarditis  7.  Strongyloides serology  8.  Obtain a duodenal aspirate for ova and parasites if a duodenal feeding tube is placed  9.  Echocardiogram-we will obtain a TTE but she may require a KHARI    Dr. Art has obtained the history, performed the physical exam and formulated the above treatment plan.      I discussed her extremely complex situation with Dr. Benitez today.  I discussed her complex situation with [facility] today.    Clovis Art MD  3/7/2022  14:36 EST

## 2022-03-07 NOTE — THERAPY EVALUATION
Acute Care - Speech Language Pathology   Swallow Initial Evaluation [facility]   Clinical Swallow Evaluation       Patient Name: Karen Larkin  : 1955  MRN: [id number]  Today's Date: 3/7/2022               Admit Date: 3/5/2022    Visit Dx:     ICD-10-CM ICD-9-CM   1. Multifocal pneumonia  J18.9 486   2. Atrial fibrillation with RVR (HCC)  I48.91 427.31   3. Sepsis, due to unspecified organism, unspecified whether acute organ dysfunction present (HCC)  A41.9 038.9     995.91   4. Hypotension, unspecified hypotension type  I95.9 458.9   5. Hypoxemia  R09.02 799.02   6. Dysphagia, unspecified type  R13.10 787.20     Patient Active Problem List   Diagnosis   • Severe sepsis (HCC)   • Hiatal hernia with gastroesophageal reflux   • Rheumatoid arthritis   • Immunosuppression due to chronic steroid use   • Atrial fibrillation (HCC)   • Polycystic liver disease   • Chronic pain on Narcotics   • Pelvic prolapse   • Severe malnutrition (CMS/HCC)   • Coffee ground emesis   • Essential hypertension   • PAF (paroxysmal atrial fibrillation) (HCC)   • Chest pain, atypical   • Infarction of parietal lobe (HCC)   • Elevated brain natriuretic peptide (BNP) level   • Elevated BUN   • Dehydration   • Elevated alkaline phosphatase level   • Serum total bilirubin elevated   • Leukopenia   • Aspiration pneumonia (HCC)   • Acute respiratory failure with hypoxia (HCC)     Past Medical History:   Diagnosis Date   • Anemia    • Arthritis     Rheumatoid   • GERD (gastroesophageal reflux disease)    • Hiatal hernia    • Liver disease    • Murmur    • Rheumatoid arthritis (HCC)    • Varicose veins of both lower extremities      Past Surgical History:   Procedure Laterality Date   • ENDOSCOPY N/A 2022    Procedure: ESOPHAGOGASTRODUODENOSCOPY;  Surgeon: Brunner, Mark I, MD;  [facility]: [facility];  Service: Gastroenterology;  Laterality: N/A;   • HERNIA REPAIR     • HIP SURGERY Right    • SHOULDER SURGERY Left        SLP  Recommendation and Plan  SLP Swallowing Diagnosis: mild, oral dysphagia, suspected pharyngeal dysphagia, esophageal dysphagia (03/07/22 1400)  SLP Diet Recommendation: NPO, temporary alternate methods of nutrition/hydration, other (see comments) (OK for 3-4 ice chips per hr at MD discretion) (03/07/22 1400)     SLP Rec. for Method of Medication Administration: meds via alternate route (03/07/22 1400)        Recommended Diagnostics: reassess via clinical swallow evaluation (03/07/22 1400)  Swallow Criteria for Skilled Therapeutic Interventions Met: demonstrates skilled criteria (03/07/22 1400)  Anticipated Discharge Disposition (SLP): unknown, anticipate therapy at next level of care (03/07/22 1400)  Rehab Potential/Prognosis, Swallowing: adequate, monitor progress closely (03/07/22 1400)  Therapy Frequency (Swallow): evaluation only (03/07/22 1400)  Predicted Duration Therapy Intervention (Days): until discharge (03/07/22 1400)                                         SWALLOW EVALUATION (last 72 hours)     SLP Adult Swallow Evaluation     Row Name 03/07/22 1400                   Rehab Evaluation    Document Type evaluation  -CH        Subjective Information no complaints  -CH        Patient Observations alert;cooperative;agree to therapy  -CH        Patient/Family/Caregiver Comments/Observations No family present  -CH        Patient Effort good  -CH        Symptoms Noted During/After Treatment none  -CH                  General Information    Patient Profile Reviewed yes  -CH        Pertinent History Of Current Problem Patient admitted with aspiration pneumonia, lrg paraesophageal hernia and erosive gastric reflux with bleeding. CXR: Near complete consolidation of both lower lobes.  -CH        Current Method of Nutrition NPO;nasogastric feedings  -CH        Precautions/Limitations, Vision WFL;for purposes of eval  -CH        Precautions/Limitations, Hearing WFL;for purposes of eval  -CH        Prior Level of  Function-Communication WFL  -CH        Prior Level of Function-Swallowing no diet consistency restrictions  -CH        Plans/Goals Discussed with patient;agreed upon  -        Barriers to Rehab medically complex  -        Patient's Goals for Discharge return to PO diet  -CH                  Pain    Additional Documentation Pain Scale: FACES Pre/Post-Treatment (Group)  -CH                  Pain Scale: FACES Pre/Post-Treatment    Pain: FACES Scale, Pretreatment 0-->no hurt  -CH        Posttreatment Pain Rating 0-->no hurt  -CH                  Oral Motor Structure and Function    Dentition Assessment natural, present and adequate  -CH        Secretion Management WNL/WFL  -CH        Mucosal Quality dry  -CH        Volitional Swallow delayed;weak  -CH        Volitional Cough weak  -CH                  Oral Musculature and Cranial Nerve Assessment    Oral Motor General Assessment generalized oral motor weakness  -CH                  General Eating/Swallowing Observations    Respiratory Support Currently in Use nasal cannula;other (see comments)  3L  -CH        Eating/Swallowing Skills fed by SLP  -CH        Positioning During Eating upright in bed;upright 90 degree  -CH        Utensils Used spoon  -CH        Consistencies Trialed ice chips;thin liquids;pureed  -CH        Pre SpO2 (%) 96  -CH        Post SpO2 (%) 94  -CH                  Respiratory    Respiratory Status WFL  -CH                  Clinical Swallow Eval    Oral Prep Phase impaired  -CH        Oral Transit impaired  -CH        Oral Residue WFL  -CH        Pharyngeal Phase suspected pharyngeal impairment  -CH        Esophageal Phase suspected esophageal impairment  -CH        Clinical Swallow Evaluation Summary Patient was given trials of thin via ice chips and thin H2O via teaspoon and pureed. Patient felt as though all trials were stuck in ther throat. Multiple swallows with all constencies and wet vocal quailty w thin via teaspoon. Patient began  gagging and vomited immediately following evaluation. recommend continued NPO with alternate means of nutrition. Ok for 3-4 ice chips per hour per MD discretion. SLP to re-evaluate swallow as appropriate.  -                  Oral Prep Concerns    Oral Prep Concerns oral holding;prolonged mastication  -        Oral Holding all consistencies  -        Prolonged Mastication all consistencies  -                  Oral Transit Concerns    Oral Transit Concerns increased oral transit time;delayed initiation of bolus transit  -        Delayed Intiation of Bolus Transit all consistencies  -        Increased Oral Transit Time all consistencies  -                  Pharyngeal Phase Concerns    Pharyngeal Phase Concerns wet vocal quality;multiple swallows  -                  Esophageal Phase Concerns    Esophageal Phase Concerns sensation of material sticking  -        Sensation of Material Sticking all consistencies  -                  SLP Evaluation Clinical Impression    SLP Swallowing Diagnosis mild;oral dysphagia;suspected pharyngeal dysphagia;esophageal dysphagia  -        Functional Impact risk of aspiration/pneumonia;risk of malnutrition;risk of dehydration  -        Rehab Potential/Prognosis, Swallowing adequate, monitor progress closely  -        Swallow Criteria for Skilled Therapeutic Interventions Met demonstrates skilled criteria  -                  Recommendations    Therapy Frequency (Swallow) evaluation only  -        Predicted Duration Therapy Intervention (Days) until discharge  -        SLP Diet Recommendation NPO;temporary alternate methods of nutrition/hydration;other (see comments)  OK for 3-4 ice chips per hr at MD discretion  -        Recommended Diagnostics reassess via clinical swallow evaluation  -        Oral Care Recommendations Oral Care BID/PRN;Suction toothbrush  -        SLP Rec. for Method of Medication Administration meds via alternate route  -         Anticipated Discharge Disposition (SLP) unknown;anticipate therapy at next level of care  -              User Key  (r) = Recorded By, (t) = Taken By, (c) = Cosigned By    Initials Name Effective Dates    Florinda Frost MS CCC-SLP 06/16/21 -                 EDUCATION  The patient has been educated in the following areas:   Dysphagia (Swallowing Impairment) Oral Care/Hydration NPO rationale.              Time Calculation:    Time Calculation- SLP     Row Name 03/07/22 1631             Time Calculation- SLP    SLP Start Time 1400  -CH      SLP Received On 03/07/22  -CH              Untimed Charges    SLP Eval/Re-eval  ST Eval Oral Pharyng Swallow - 86035  -CH      64573-FL Eval Oral Pharyng Swallow Minutes 55  -CH              Total Minutes    Untimed Charges Total Minutes 55  -CH       Total Minutes 55  -CH            User Key  (r) = Recorded By, (t) = Taken By, (c) = Cosigned By    Initials Name Provider Type    Florinda Frost MS CCC-SLP Speech and Language Pathologist                Therapy Charges for Today     Code Description Service Date Service Provider Modifiers Qty    [id number] HC ST EVAL ORAL PHARYNG SWALLOW 4 3/7/2022 Florinda Bob MS CCC-SLP GN 1               Florinda Bob MS CCC-SLP  3/7/2022       Patient was not wearing a face mask and did exhibit coughing during this therapy encounter.  Procedure performed was aerosolizing, involved close contact (within 6 feet for at least 15 minutes or longer), and did not involve contact with infectious secretions or specimens.  Therapist used appropriate personal protective equipment including gloves, standard procedure mask and eye protection.  Appropriate PPE was worn during the entire therapy session.  Hand hygiene was completed before and after therapy session.

## 2022-03-08 ENCOUNTER — APPOINTMENT (OUTPATIENT)
Dept: CARDIOLOGY | Facility: HOSPITAL | Age: 67
End: 2022-03-08

## 2022-03-08 ENCOUNTER — ANESTHESIA EVENT (OUTPATIENT)
Dept: CARDIOLOGY | Facility: HOSPITAL | Age: 67
End: 2022-03-08

## 2022-03-08 ENCOUNTER — APPOINTMENT (OUTPATIENT)
Dept: GENERAL RADIOLOGY | Facility: HOSPITAL | Age: 67
End: 2022-03-08

## 2022-03-08 ENCOUNTER — ANESTHESIA (OUTPATIENT)
Dept: CARDIOLOGY | Facility: HOSPITAL | Age: 67
End: 2022-03-08

## 2022-03-08 LAB
ALBUMIN SERPL-MCNC: 2.5 G/DL (ref 3.5–5.2)
ALBUMIN/GLOB SERPL: 1.1 G/DL
ALP SERPL-CCNC: 229 U/L (ref 39–117)
ALT SERPL W P-5'-P-CCNC: 10 U/L (ref 1–33)
ANION GAP SERPL CALCULATED.3IONS-SCNC: 7 MMOL/L (ref 5–15)
ASCENDING AORTA: 2.8 CM
AST SERPL-CCNC: 19 U/L (ref 1–32)
BACTERIA SPEC AEROBE CULT: ABNORMAL
BACTERIA SPEC AEROBE CULT: ABNORMAL
BASOPHILS # BLD AUTO: 0.01 10*3/MM3 (ref 0–0.2)
BASOPHILS NFR BLD AUTO: 0.4 % (ref 0–1.5)
BILIRUB SERPL-MCNC: 0.6 MG/DL (ref 0–1.2)
BUN SERPL-MCNC: 14 MG/DL (ref 8–23)
BUN/CREAT SERPL: 43.8 (ref 7–25)
CALCIUM SPEC-SCNC: 7.9 MG/DL (ref 8.6–10.5)
CHLORIDE SERPL-SCNC: 116 MMOL/L (ref 98–107)
CO2 SERPL-SCNC: 20 MMOL/L (ref 22–29)
CREAT SERPL-MCNC: 0.32 MG/DL (ref 0.57–1)
DEPRECATED RDW RBC AUTO: 53.1 FL (ref 37–54)
EGFRCR SERPLBLD CKD-EPI 2021: 116.8 ML/MIN/1.73
EOSINOPHIL # BLD AUTO: 0 10*3/MM3 (ref 0–0.4)
EOSINOPHIL NFR BLD AUTO: 0 % (ref 0.3–6.2)
ERYTHROCYTE [DISTWIDTH] IN BLOOD BY AUTOMATED COUNT: 15.2 % (ref 12.3–15.4)
GLOBULIN UR ELPH-MCNC: 2.3 GM/DL
GLUCOSE SERPL-MCNC: 290 MG/DL (ref 65–99)
GRAM STN SPEC: ABNORMAL
HCT VFR BLD AUTO: 30.6 % (ref 34–46.6)
HGB BLD-MCNC: 9.6 G/DL (ref 12–15.9)
IMM GRANULOCYTES # BLD AUTO: 0.05 10*3/MM3 (ref 0–0.05)
IMM GRANULOCYTES NFR BLD AUTO: 1.8 % (ref 0–0.5)
ISOLATED FROM: ABNORMAL
ISOLATED FROM: ABNORMAL
LV EF 2D ECHO EST: 56 %
LYMPHOCYTES # BLD AUTO: 0.14 10*3/MM3 (ref 0.7–3.1)
LYMPHOCYTES NFR BLD AUTO: 5.1 % (ref 19.6–45.3)
MAGNESIUM SERPL-MCNC: 1.8 MG/DL (ref 1.6–2.4)
MAXIMAL PREDICTED HEART RATE: 156 BPM
MCH RBC QN AUTO: 29.8 PG (ref 26.6–33)
MCHC RBC AUTO-ENTMCNC: 31.4 G/DL (ref 31.5–35.7)
MCV RBC AUTO: 95 FL (ref 79–97)
MONOCYTES # BLD AUTO: 0.24 10*3/MM3 (ref 0.1–0.9)
MONOCYTES NFR BLD AUTO: 8.7 % (ref 5–12)
NEUTROPHILS NFR BLD AUTO: 2.32 10*3/MM3 (ref 1.7–7)
NEUTROPHILS NFR BLD AUTO: 84 % (ref 42.7–76)
NRBC BLD AUTO-RTO: 0 /100 WBC (ref 0–0.2)
PHOSPHATE SERPL-MCNC: 1.9 MG/DL (ref 2.5–4.5)
PHOSPHATE SERPL-MCNC: 2.8 MG/DL (ref 2.5–4.5)
PLATELET # BLD AUTO: 150 10*3/MM3 (ref 140–450)
PMV BLD AUTO: 10.2 FL (ref 6–12)
POTASSIUM SERPL-SCNC: 3.3 MMOL/L (ref 3.5–5.2)
POTASSIUM SERPL-SCNC: 4.4 MMOL/L (ref 3.5–5.2)
PROT SERPL-MCNC: 4.8 G/DL (ref 6–8.5)
RBC # BLD AUTO: 3.22 10*6/MM3 (ref 3.77–5.28)
SINUS: 3.1 CM
SODIUM SERPL-SCNC: 143 MMOL/L (ref 136–145)
STJ: 2.3 CM
STRESS TARGET HR: 133 BPM
STRONGYLOIDES IGG SER QL IA: NEGATIVE
WBC NRBC COR # BLD: 2.76 10*3/MM3 (ref 3.4–10.8)

## 2022-03-08 PROCEDURE — 97530 THERAPEUTIC ACTIVITIES: CPT

## 2022-03-08 PROCEDURE — 93320 DOPPLER ECHO COMPLETE: CPT

## 2022-03-08 PROCEDURE — 80053 COMPREHEN METABOLIC PANEL: CPT | Performed by: INTERNAL MEDICINE

## 2022-03-08 PROCEDURE — 0 POTASSIUM CHLORIDE PER 2 MEQ: Performed by: NURSE PRACTITIONER

## 2022-03-08 PROCEDURE — 83735 ASSAY OF MAGNESIUM: CPT | Performed by: INTERNAL MEDICINE

## 2022-03-08 PROCEDURE — 93312 ECHO TRANSESOPHAGEAL: CPT | Performed by: INTERNAL MEDICINE

## 2022-03-08 PROCEDURE — 93321 DOPPLER ECHO F-UP/LMTD STD: CPT | Performed by: INTERNAL MEDICINE

## 2022-03-08 PROCEDURE — 84100 ASSAY OF PHOSPHORUS: CPT | Performed by: INTERNAL MEDICINE

## 2022-03-08 PROCEDURE — 93325 DOPPLER ECHO COLOR FLOW MAPG: CPT | Performed by: INTERNAL MEDICINE

## 2022-03-08 PROCEDURE — 71045 X-RAY EXAM CHEST 1 VIEW: CPT

## 2022-03-08 PROCEDURE — 25010000002 ONDANSETRON PER 1 MG: Performed by: INTERNAL MEDICINE

## 2022-03-08 PROCEDURE — 99232 SBSQ HOSP IP/OBS MODERATE 35: CPT | Performed by: INTERNAL MEDICINE

## 2022-03-08 PROCEDURE — 25010000002 ENOXAPARIN PER 10 MG

## 2022-03-08 PROCEDURE — 25010000002 HYDROCORTISONE SODIUM SUCCINATE 100 MG RECONSTITUTED SOLUTION: Performed by: NURSE PRACTITIONER

## 2022-03-08 PROCEDURE — 63710000001 PREDNISONE PER 1 MG: Performed by: INTERNAL MEDICINE

## 2022-03-08 PROCEDURE — 92610 EVALUATE SWALLOWING FUNCTION: CPT

## 2022-03-08 PROCEDURE — 85025 COMPLETE CBC W/AUTO DIFF WBC: CPT | Performed by: INTERNAL MEDICINE

## 2022-03-08 PROCEDURE — 97162 PT EVAL MOD COMPLEX 30 MIN: CPT

## 2022-03-08 PROCEDURE — 25010000002 CEFTRIAXONE PER 250 MG: Performed by: INTERNAL MEDICINE

## 2022-03-08 PROCEDURE — 87040 BLOOD CULTURE FOR BACTERIA: CPT | Performed by: INTERNAL MEDICINE

## 2022-03-08 PROCEDURE — 25010000002 AMPICILLIN-SULBACTAM PER 1.5 G: Performed by: INTERNAL MEDICINE

## 2022-03-08 PROCEDURE — 84132 ASSAY OF SERUM POTASSIUM: CPT | Performed by: INTERNAL MEDICINE

## 2022-03-08 PROCEDURE — 93325 DOPPLER ECHO COLOR FLOW MAPG: CPT

## 2022-03-08 RX ORDER — CHOLECALCIFEROL (VITAMIN D3) 125 MCG
5 CAPSULE ORAL NIGHTLY PRN
Status: DISCONTINUED | OUTPATIENT
Start: 2022-03-08 | End: 2022-03-11 | Stop reason: HOSPADM

## 2022-03-08 RX ORDER — PREDNISONE 20 MG/1
20 TABLET ORAL
Status: DISCONTINUED | OUTPATIENT
Start: 2022-03-08 | End: 2022-03-09

## 2022-03-08 RX ORDER — HYDROCODONE BITARTRATE AND ACETAMINOPHEN 5; 325 MG/1; MG/1
1 TABLET ORAL EVERY 6 HOURS PRN
Status: DISCONTINUED | OUTPATIENT
Start: 2022-03-08 | End: 2022-03-11 | Stop reason: HOSPADM

## 2022-03-08 RX ORDER — AMOXICILLIN 250 MG
1 CAPSULE ORAL 2 TIMES DAILY
Status: DISCONTINUED | OUTPATIENT
Start: 2022-03-08 | End: 2022-03-11 | Stop reason: HOSPADM

## 2022-03-08 RX ORDER — PANTOPRAZOLE SODIUM 40 MG/1
40 TABLET, DELAYED RELEASE ORAL
Status: DISCONTINUED | OUTPATIENT
Start: 2022-03-09 | End: 2022-03-11 | Stop reason: HOSPADM

## 2022-03-08 RX ADMIN — SODIUM CHLORIDE 2 G: 900 INJECTION INTRAVENOUS at 18:46

## 2022-03-08 RX ADMIN — ONDANSETRON 4 MG: 2 INJECTION INTRAMUSCULAR; INTRAVENOUS at 10:01

## 2022-03-08 RX ADMIN — HYDROCORTISONE SODIUM SUCCINATE 50 MG: 100 INJECTION, POWDER, FOR SOLUTION INTRAMUSCULAR; INTRAVENOUS at 05:12

## 2022-03-08 RX ADMIN — SENNOSIDES 5 ML: 8.8 LIQUID ORAL at 10:01

## 2022-03-08 RX ADMIN — Medication 10 ML: at 08:15

## 2022-03-08 RX ADMIN — Medication 10 ML: at 21:30

## 2022-03-08 RX ADMIN — SUCRALFATE 1 G: 1 TABLET ORAL at 21:29

## 2022-03-08 RX ADMIN — HYDROCODONE BITARTRATE AND ACETAMINOPHEN 1 TABLET: 5; 325 TABLET ORAL at 23:19

## 2022-03-08 RX ADMIN — POTASSIUM CHLORIDE 20 MEQ: 400 INJECTION, SOLUTION INTRAVENOUS at 04:38

## 2022-03-08 RX ADMIN — PREDNISONE 20 MG: 20 TABLET ORAL at 10:01

## 2022-03-08 RX ADMIN — PANTOPRAZOLE SODIUM 40 MG: 40 INJECTION, POWDER, FOR SOLUTION INTRAVENOUS at 08:14

## 2022-03-08 RX ADMIN — DOCUSATE SODIUM 50 MG: 50 LIQUID ORAL at 10:02

## 2022-03-08 RX ADMIN — SUCRALFATE 1 G: 1 TABLET ORAL at 11:10

## 2022-03-08 RX ADMIN — SODIUM CHLORIDE 3 G: 900 INJECTION INTRAVENOUS at 11:24

## 2022-03-08 RX ADMIN — ENOXAPARIN SODIUM 30 MG: 30 INJECTION SUBCUTANEOUS at 05:12

## 2022-03-08 RX ADMIN — SODIUM CHLORIDE 2 G: 900 INJECTION INTRAVENOUS at 05:12

## 2022-03-08 RX ADMIN — Medication 10 ML: at 21:29

## 2022-03-08 RX ADMIN — SODIUM CHLORIDE 3 G: 900 INJECTION INTRAVENOUS at 18:46

## 2022-03-08 RX ADMIN — HYDROCODONE BITARTRATE AND ACETAMINOPHEN 1 TABLET: 5; 325 TABLET ORAL at 10:01

## 2022-03-08 RX ADMIN — Medication 5 MG: at 03:23

## 2022-03-08 RX ADMIN — SUCRALFATE 1 G: 1 TABLET ORAL at 08:14

## 2022-03-08 RX ADMIN — POTASSIUM CHLORIDE 20 MEQ: 400 INJECTION, SOLUTION INTRAVENOUS at 05:55

## 2022-03-08 RX ADMIN — POTASSIUM PHOSPHATE, MONOBASIC AND POTASSIUM PHOSPHATE, DIBASIC 15 MMOL: 224; 236 INJECTION, SOLUTION INTRAVENOUS at 04:38

## 2022-03-08 RX ADMIN — SODIUM CHLORIDE 3 G: 900 INJECTION INTRAVENOUS at 23:19

## 2022-03-08 RX ADMIN — Medication 5 MG: at 21:30

## 2022-03-08 RX ADMIN — SODIUM CHLORIDE 3 G: 900 INJECTION INTRAVENOUS at 05:55

## 2022-03-08 NOTE — NURSING NOTE
WOC follow-up:     Bottom, right heel       Bottom:   Is intact with some noted scar tissue with both hyper and hypopigmentation.   No open areas.     -Continue with good general skin care, barrier cream, and offloading as needed.     Right heel:   Wound bed is dry and crusted.   No drainage.   Debridement is not needed at this time.   Will discontinue Thera honey.     -Paint with betadine daily.   Place Optifoam dressing.   Keep heels offloaded.     WOC will continue to follow.     Thanks

## 2022-03-08 NOTE — CONSULTS
"                    Clinical Nutrition       Patient Name: Karen Larkin  YOB: 1955  MRN: [id number]  Date of Encounter: 03/08/22 13:49 EST  Admission date: 3/5/2022      Reason for Visit   Consult from SLP regarding food preferences    \"Hx esophageal dysphagia r/t GERD/hernia + edentulous. Pt thinks some of the chopped meats will be too much but didn't want (understandably) pureed meats. Would benefit from food preferences so we are not over restricting. Ok to advance or downgrade pt's info re: food preferences.\"    EMR  Reviewed   Yes    (3/8) SLP FEES - soft/chopped/thin      Reported/Observed/Food/Nutrition Related - Comments   Patient currently NPO for procedure but will resume a PO diet afterward. RD visited with patient to obtain food preferences/meal orders and discuss ONS drinks (likes wildberry Boost Breeze, wants to try chocolate Boost Plus).       Current Nutrition Prescription     NPO Diet (for procedure)    Average Intake from Charting: insufficient data    Actions     Follow treatment progress, Care plan reviewed, Advise alternate selection, Interview for preferences, Supplement provided     -Communicated food/drink preferences to diet office for several future meals.  -Ordered Boost Breeze 1x daily and Boost Plus 1x daily.      Monitor Per Protocol      Haley Dunaway RD  Time Spent: 45 min        "

## 2022-03-08 NOTE — ANESTHESIA PREPROCEDURE EVALUATION
Anesthesia Evaluation     Patient summary reviewed and Nursing notes reviewed   no history of anesthetic complications:  NPO Solid Status: > 8 hours  NPO Liquid Status: > 2 hours           Airway   Mallampati: II  TM distance: >3 FB  Neck ROM: full  Dental    (+) edentulous and poor dentition    Pulmonary - normal exam     ROS comment: On RA  Cardiovascular     ECG reviewed    (+) hypertension, valvular problems/murmurs (endocarditis) MR and TI, dysrhythmias Atrial Fib, murmur,     ROS comment: Pt has evidence of CHEY and HOCM on TTE.     Neuro/Psych  GI/Hepatic/Renal/Endo    (+)  hiatal hernia, GERD,  liver disease,     Musculoskeletal     Abdominal    Substance History      OB/GYN          Other   arthritis,                      Anesthesia Plan    ASA 3     general   (PROPOFOL)  intravenous induction     Anesthetic plan, all risks, benefits, and alternatives have been provided, discussed and informed consent has been obtained with: patient.    Plan discussed with CRNA.        CODE STATUS:    Code Status (Patient has no pulse and is not breathing): CPR (Attempt to Resuscitate)  Medical Interventions (Patient has pulse or is breathing): Full Support

## 2022-03-08 NOTE — PLAN OF CARE
Goal Outcome Evaluation:  Plan of Care Reviewed With: patient        Progress: improving  Outcome Evaluation: PT eval is completed patient presents with sepsis and PNA. she demonstrates impaired bed mobility transfers and gait as well as decreased strength and balance both sitting and standing. patient was able to pivot to the chair with max assist. anticipate patient to need SNF placement at D/C

## 2022-03-08 NOTE — THERAPY RE-EVALUATION
Acute Care - Speech Language Pathology   Swallow Re-Evaluation [facility]   Clinical Swallow Evaluation     Patient Name: Karen Larkin  : 1955  MRN: [id number]  Today's Date: 3/8/2022               Admit Date: 3/5/2022    Visit Dx:     ICD-10-CM ICD-9-CM   1. Multifocal pneumonia  J18.9 486   2. Atrial fibrillation with RVR (HCC)  I48.91 427.31   3. Sepsis, due to unspecified organism, unspecified whether acute organ dysfunction present (HCC)  A41.9 038.9     995.91   4. Hypotension, unspecified hypotension type  I95.9 458.9   5. Hypoxemia  R09.02 799.02   6. Dysphagia, unspecified type  R13.10 787.20     Patient Active Problem List   Diagnosis   • Severe sepsis (HCC)   • Hiatal hernia with gastroesophageal reflux   • Rheumatoid arthritis   • Immunosuppression due to chronic steroid use   • Atrial fibrillation (HCC)   • Polycystic liver disease   • Chronic pain on Narcotics   • Pelvic prolapse   • Severe malnutrition (CMS/HCC)   • Coffee ground emesis   • Essential hypertension   • PAF (paroxysmal atrial fibrillation) (HCC)   • Chest pain, atypical   • Infarction of parietal lobe (HCC)   • Elevated brain natriuretic peptide (BNP) level   • Elevated BUN   • Dehydration   • Elevated alkaline phosphatase level   • Serum total bilirubin elevated   • Leukopenia   • Aspiration pneumonia (HCC)   • Acute respiratory failure with hypoxia (HCC)     Past Medical History:   Diagnosis Date   • Anemia    • Arthritis     Rheumatoid   • GERD (gastroesophageal reflux disease)    • Hiatal hernia    • Liver disease    • Murmur    • Rheumatoid arthritis (HCC)    • Varicose veins of both lower extremities      Past Surgical History:   Procedure Laterality Date   • ENDOSCOPY N/A 2022    Procedure: ESOPHAGOGASTRODUODENOSCOPY;  Surgeon: Brunner, Mark I, MD;  Location: [facility] ENDOSCOPY;  Service: Gastroenterology;  Laterality: N/A;   • HERNIA REPAIR     • HIP SURGERY Right    • SHOULDER SURGERY Left        SLP Recommendation  and Plan  SLP Swallowing Diagnosis: esophageal dysphagia (03/08/22 1015)  SLP Diet Recommendation: soft textures, chopped, thin liquids (03/08/22 1015)  Recommended Precautions and Strategies: upright posture during/after eating, general aspiration precautions, reflux precautions (03/08/22 1015)  SLP Rec. for Method of Medication Administration: as tolerated (pt cuts some and/pr places in puree at baseline) (03/08/22 1015)     Monitor for Signs of Aspiration: notify SLP if any concerns (03/08/22 1015)     Swallow Criteria for Skilled Therapeutic Interventions Met: demonstrates skilled criteria (03/08/22 1015)     Rehab Potential/Prognosis, Swallowing: good, to achieve stated therapy goals (03/08/22 1015)  Therapy Frequency (Swallow): 5 days per week (03/08/22 1015)  Predicted Duration Therapy Intervention (Days): until discharge (03/08/22 1015)  Demonstrates Need for Referral to Another Service: clinical nutrition services/dietitian, other (see comments) (to assist with solids r/t GERD - adjust to pt's preference) (03/08/22 1015)                               Plan of Care Reviewed With: patient  Progress: improving      SWALLOW EVALUATION (last 72 hours)     SLP Adult Swallow Evaluation     Row Name 03/08/22 1015                Rehab Evaluation    Document Type re-evaluation  -       Subjective Information no complaints  -       Patient Observations alert;cooperative  -       Patient/Family/Caregiver Comments/Observations --       Patient Effort --       Symptoms Noted During/After Treatment --               General Information    Patient Profile Reviewed --       Pertinent History Of Current Problem --       Current Method of Nutrition NPO;nasogastric feedings  -       Precautions/Limitations, Vision --       Precautions/Limitations, Hearing --       Prior Level of Function-Communication --       Prior Level of Function-Swallowing --       Plans/Goals Discussed with patient;agreed upon  -       Barriers  to Rehab none identified  -       Patient's Goals for Discharge return to PO diet  -               Pain    Additional Documentation Pain Scale: Numbers Pre/Post-Treatment (Group)  -               Pain Scale: Numbers Pre/Post-Treatment    Pretreatment Pain Rating 0/10 - no pain  -       Posttreatment Pain Rating 0/10 - no pain  -               Pain Scale: FACES Pre/Post-Treatment    Pain: FACES Scale, Pretreatment --       Posttreatment Pain Rating --               Oral Motor Structure and Function    Dentition Assessment --       Secretion Management --       Mucosal Quality --       Volitional Swallow --       Volitional Cough --               Oral Musculature and Cranial Nerve Assessment    Oral Motor General Assessment --               General Eating/Swallowing Observations    Respiratory Support Currently in Use --       Eating/Swallowing Skills --       Positioning During Eating --       Utensils Used --       Consistencies Trialed --       Pre SpO2 (%) --       Post SpO2 (%) --               Respiratory    Respiratory Status --               Clinical Swallow Eval    Oral Prep Phase --       Oral Transit --       Oral Residue --       Pharyngeal Phase no overt signs/symptoms of pharyngeal impairment  -       Esophageal Phase --       Clinical Swallow Evaluation Summary Oral difficulties r/t edentulous state. Esophageal difficulties r/t known GERD/hernia. Otherwise significantly improved, showing no s/s aspiration. Suspect PNA could be more related to known vomiting.  -               Oral Prep Concerns    Oral Prep Concerns --       Oral Holding --       Prolonged Mastication --               Oral Transit Concerns    Oral Transit Concerns --       Delayed Intiation of Bolus Transit --       Increased Oral Transit Time --               Pharyngeal Phase Concerns    Pharyngeal Phase Concerns --               Esophageal Phase Concerns    Esophageal Phase Concerns --       Sensation of Material Sticking  --               SLP Evaluation Clinical Impression    SLP Swallowing Diagnosis esophageal dysphagia  -       Functional Impact risk of aspiration/pneumonia  -       Rehab Potential/Prognosis, Swallowing good, to achieve stated therapy goals  -       Swallow Criteria for Skilled Therapeutic Interventions Met demonstrates skilled criteria  -               Recommendations    Therapy Frequency (Swallow) 5 days per week  -       Predicted Duration Therapy Intervention (Days) until discharge  -       SLP Diet Recommendation soft textures;chopped;thin liquids  -       Recommended Diagnostics --       Recommended Precautions and Strategies upright posture during/after eating;general aspiration precautions;reflux precautions  -       Oral Care Recommendations Oral Care BID/PRN  -       SLP Rec. for Method of Medication Administration as tolerated  pt cuts some and/pr places in puree at baseline  -       Monitor for Signs of Aspiration notify SLP if any concerns  -       Anticipated Discharge Disposition (SLP) --       Demonstrates Need for Referral to Another Service clinical nutrition services/dietitian;other (see comments)  to assist with solids r/t GERD - adjust to pt's preference  -             User Key  (r) = Recorded By, (t) = Taken By, (c) = Cosigned By    Initials Name Effective Dates    Sandra Vernon MS CCC-SLP 06/16/21 -     Florinda Frost MS CCC-SLP 06/16/21 -                 EDUCATION  The patient has been educated in the following areas:   Dysphagia (Swallowing Impairment) Modified Diet Instruction.              Time Calculation:    Time Calculation- SLP     Row Name 03/08/22 1121             Time Calculation- [facility]    SLP Start Time 1015  -      SLP Received On 03/08/22  -              Untimed Charges    26707-HA Eval Oral Pharyng Swallow Minutes 40  -SM              Total Minutes    Untimed Charges Total Minutes 40  -SM       Total Minutes 40  -SM            User  Key  (r) = Recorded By, (t) = Taken By, (c) = Cosigned By    Initials Name Provider Type    Sandra Vernon MS CCC-SLP Speech and Language Pathologist                Therapy Charges for Today     Code Description Service Date Service Provider Modifiers Qty    [id number]  ST EVAL ORAL PHARYNG SWALLOW 3 3/8/2022 Sandra Hubbard MS CCC-SLP GN 1            Patient was not wearing a face mask and did not exhibit coughing during this therapy encounter.  Procedure performed was aerosolizing, involved close contact (within 6 feet for at least 15 minutes or longer), and did not involve contact with infectious secretions or specimens.  Therapist used appropriate personal protective equipment including gloves, standard procedure mask and eye protection.  Appropriate PPE was worn during the entire therapy session.  Hand hygiene was completed before and after therapy session.       Sandra Hubbard MS CCC-SLP  3/8/2022

## 2022-03-08 NOTE — PROGRESS NOTES
INFECTIOUS DISEASE Progress Note    Karen Larkin  1955  [id number]    Admission Date: 3/5/2022      Requesting Provider: No ref. provider found  Evaluating Physician: Clovis Art MD    Reason for Consultation: Bacteremia     History of present illness:    3/7/22: Patient is a 66 y.o. female, with PMH RA on Prednisone, large HH, autoimmune heapatitis, GERD, VHD, afib  diastolic heart failure, PCKD, seen today for Enterococcal bacteremia.  [place] [facility] [facility] for rehab.  Readmitted here 2/24- 3/4 with upper GI bleed, undergoing EGD which revealed complicated reflux disease, reflux esophagitis, type IV paraesophageal hernia with 3/4 of her stomach in the intrathoracic cavity. Developed nausea, vomiting, hypotension, and was transferred back to [facility] and returned 3/5 with shortness of breath, hypotension.  CTA of chest with patchy consolidative airspace disease of both lungs, with complete consolidation lower lobes.  Tmax of 99.2. Admitting labs with PCT 1.8, lactate 2.1, WBC 1.8, Sc r 0.97, Alk [id number], UA with 6-12 WBC, UC no growth.  Blood cultures now positive for E. Faecalis, not VRE, and we were consulted for evaluation and treatment. Currently on Vancomycin and Zosyn.    Review of her records at [facility] revealed an echocardiogram demonstrating aortic and mitral valvular calcifications and significant mitral valve disease with MR.    3/8/22: She has remained afebrile.  Her O2 saturation is 91% on room air. 2 sets of blood cultures from 3/5 are growing Enterococcus faecalis sensitive to ampicillin. Her creatinine this morning is 0.32. Her white blood cell count is 2.76. Strongyloidiasis antibody is pending from 3/7. I ordered a KHARI earlier today.  This was performed and revealed no overt evidence of vegetation but this was a technically difficult study due to her hiatal hernia. Overall, she feels much better  today.      Past Medical History:   Diagnosis Date   • Anemia    • Arthritis     Rheumatoid   • GERD (gastroesophageal reflux disease)    • Hiatal hernia    • Liver disease    • Murmur    • Rheumatoid arthritis (HCC)    • Varicose veins of both lower extremities        Past Surgical History:   Procedure Laterality Date   • ENDOSCOPY N/A 2/24/2022    Procedure: ESOPHAGOGASTRODUODENOSCOPY;  Surgeon: Brunner, Mark I, MD;  Location: [facility] ENDOSCOPY;  Service: Gastroenterology;  Laterality: N/A;   • HERNIA REPAIR     • HIP SURGERY Right    • SHOULDER SURGERY Left        Family History   Problem Relation Age of Onset   • Heart disease Mother    • Cancer Mother         Colon   • Stroke Mother    • Cancer Father         Esophageal   • Stroke Father    • Cancer Sister         Colon       Social History     Socioeconomic History   • Marital status:    Tobacco Use   • Smoking status: Never Smoker   • Smokeless tobacco: Never Used   Substance and Sexual Activity   • Alcohol use: Never   • Drug use: Never   • Sexual activity: Defer       No Known Allergies      Medication:    Current Facility-Administered Medications:   •  ampicillin-sulbactam 3 g/100 mL 0.9% NS (MBP), 3 g, Intravenous, Q6H, Clovis Art MD, 3 g at 03/08/22 0555  •  bisacodyl (DULCOLAX) EC tablet 5 mg, 5 mg, Oral, Daily PRN, Case, Karen V., DO  •  bisacodyl (DULCOLAX) suppository 10 mg, 10 mg, Rectal, Daily, Case, Karen V., DO  •  cefTRIAXone (ROCEPHIN) 2 g/100 mL 0.9% NS IVPB (MBP), 2 g, Intravenous, Q12H, Clovis Art MD, 2 g at 03/08/22 0512  •  dextrose 5 % and sodium chloride 0.9 % infusion, 100 mL/hr, Intravenous, Continuous, Nereida Estrada, MARCIA, Last Rate: 100 mL/hr at 03/07/22 1333, 100 mL/hr at 03/07/22 1333  •  docusate sodium (COLACE) liquid 50 mg, 50 mg, Oral, BID, Vikram Benitez MD  •  enoxaparin (LOVENOX) syringe 30 mg, 30 mg, Subcutaneous, Q24H, Rod Cheema IV, PharmD, 30 mg at 03/08/22 0512  •   hydrocortisone sodium succinate (Solu-CORTEF) injection 50 mg, 50 mg, Intravenous, Q8H, Nereida Estrada APRN, 50 mg at 03/08/22 0512  •  melatonin tablet 5 mg, 5 mg, Oral, Nightly PRN, Gordon Lieberman, APRN, 5 mg at 03/08/22 0323  •  metoclopramide (REGLAN) injection 5 mg, 5 mg, Intravenous, Q6H PRN, Zenon Ramirez MD, 5 mg at 03/07/22 0820  •  ondansetron (ZOFRAN) injection 4 mg, 4 mg, Intravenous, Q6H PRN, Vikram Benitez MD, 4 mg at 03/07/22 1513  •  pantoprazole (PROTONIX) injection 40 mg, 40 mg, Intravenous, Q12H, Aron Karen V., DO, 40 mg at 03/07/22 2044  •  Pharmacy to dose vancomycin, , Does not apply, Continuous PRN, Nereida Estrada APRN  •  polyethylene glycol (MIRALAX) packet 17 g, 17 g, Oral, Daily PRN, Aron Karen V., DO, 17 g at 03/07/22 0820  •  potassium chloride 20 mEq in 50 mL IVPB, 20 mEq, Intravenous, Q1H PRN, Nereida Estrada APRN, Last Rate: 50 mL/hr at 03/08/22 0555, 20 mEq at 03/08/22 0555  •  potassium phosphate 45 mmol in sodium chloride 0.9 % 250 mL infusion, 45 mmol, Intravenous, PRN **OR** potassium phosphate 30 mmol in sodium chloride 0.9 % 100 mL infusion, 30 mmol, Intravenous, PRN, Last Rate: 25 mL/hr at 03/07/22 0820, 30 mmol at 03/07/22 0820 **OR** potassium phosphate 15 mmol in sodium chloride 0.9 % 100 mL infusion, 15 mmol, Intravenous, PRN, Last Rate: 50 mL/hr at 03/08/22 0438, 15 mmol at 03/08/22 0438 **OR** sodium phosphates 45 mmol in sodium chloride 0.9 % 250 mL IVPB, 45 mmol, Intravenous, PRN **OR** sodium phosphates 30 mmol in sodium chloride 0.9 % 100 mL IVPB, 30 mmol, Intravenous, PRN **OR** sodium phosphates 15 mmol in sodium chloride 0.9 % 100 mL IVPB, 15 mmol, Intravenous, PRN, Nereida Estrada, MARCIA  •  sennosides (SENOKOT) 8.8 MG/5ML syrup 5 mL, 5 mL, Oral, BID, Vikram Benitez MD  •  sodium chloride 0.9 % flush 10 mL, 10 mL, Intravenous, Q12H, Nereida Estrada, MARCIA, 10 mL at 03/07/22 2046  •  sodium chloride 0.9 % flush 10 mL, 10 mL,  Intravenous, PRN, Nereida Estrada, APRN  •  sodium chloride 0.9 % flush 10 mL, 10 mL, Intravenous, Q12H, Zenon Ramirez MD, 10 mL at 22  •  sodium chloride 0.9 % flush 10 mL, 10 mL, Intravenous, PRN, Zenon Ramirez MD  •  sodium chloride 0.9 % flush 20 mL, 20 mL, Intravenous, PRN, Zenon Ramirez MD  •  sucralfate (CARAFATE) tablet 1 g, 1 g, Oral, 4x Daily AC & at Bedtime, Case, Karen V., DO, 1 g at 22    Antibiotics:  Anti-Infectives (From admission, onward)    Ordered     Dose/Rate Route Frequency Start Stop    22 1422  ampicillin-sulbactam 3 g/100 mL 0.9% NS (MBP)        Ordering Provider: Clovis Art MD    3 g Intravenous Every 6 Hours 22 1800 22 1759    22 1422  cefTRIAXone (ROCEPHIN) 2 g/100 mL 0.9% NS IVPB (MBP)        Ordering Provider: Clovis Art MD    2 g  over 30 Minutes Intravenous Every 12 Hours 22 1800 22 1759    22 1526  vancomycin in dextrose 5% 150 mL (VANCOCIN) IVPB 750 mg        Ordering Provider: Carmela Lizarraga, PharmD    750 mg  over 60 Minutes Intravenous Once 22 1615 22 1701    22  piperacillin-tazobactam (ZOSYN) 3.375 g in iso-osmotic dextrose 50 ml (premix)        Ordering Provider: Grant Crouch PA    3.375 g  over 30 Minutes Intravenous Once 22 2151    22  vancomycin in dextrose 5% 150 mL (VANCOCIN) IVPB 750 mg        Ordering Provider: Grant Crouch PA    20 mg/kg × 42.6 kg Intravenous Once 22 2346            Review of Systems:  See HPI      Physical Exam:   Vital Signs  Temp (24hrs), Av.8 °F (36.6 °C), Min:97.4 °F (36.3 °C), Max:98 °F (36.7 °C)    Temp  Min: 97.4 °F (36.3 °C)  Max: 98 °F (36.7 °C)  BP  Min: 95/66  Max: 142/97  Pulse  Min: 86  Max: 125  Resp  Min: 14  Max: 20  SpO2  Min: 90 %  Max: 100 %    GENERAL: Awake and alert, in no acute distress.  She is frail and ill-appearing  HEENT: Normocephalic,  atraumatic.  PERRL. EOMI. No conjunctival injection. No icterus. Oropharynx clear without evidence of thrush or exudate.   NECK: Supple without nuchal rigidity. No mass.  LYMPH: No cervical, axillary or inguinal lymphadenopathy.  HEART: RRR; 3/6 murmur, rubs, gallops.   LUNGS: Bilateral rhonchi  ABDOMEN: Soft, nontender, nondistended. Positive bowel sounds. No rebound or guarding. NO mass or HSM.  EXT: She has multiple cyanotic digits on her hands and to a lesser extent on her feet.  RA deformities of joints   :  purewick   MSK:  No joint effusions   SKIN: Warm and dry numerous bruises upper extremities.  Left pretibial wound with scabbing.   NEURO: Oriented to PPT.motor strength 5/5  PSYCHIATRIC: Normal insight and judgement. Cooperative with PE    Laboratory Data    Results from last 7 days   Lab Units 03/08/22  0323 03/07/22  0432 03/07/22  0307   WBC 10*3/mm3 2.76* 3.20* 2.66*   HEMOGLOBIN g/dL 9.6* 10.3* 8.8*   HEMATOCRIT % 30.6* 31.5* 28.2*   PLATELETS 10*3/mm3 150 185 152     Results from last 7 days   Lab Units 03/08/22  0323   SODIUM mmol/L 143   POTASSIUM mmol/L 3.3*   CHLORIDE mmol/L 116*   CO2 mmol/L 20.0*   BUN mg/dL 14   CREATININE mg/dL 0.32*   GLUCOSE mg/dL 290*   CALCIUM mg/dL 7.9*     Results from last 7 days   Lab Units 03/08/22  0323   ALK PHOS U/L 229*   BILIRUBIN mg/dL 0.6   ALT (SGPT) U/L 10   AST (SGOT) U/L 19             Results from last 7 days   Lab Units 03/06/22  1257   LACTATE mmol/L 1.7         Results from last 7 days   Lab Units 03/07/22  1320   VANCOMYCIN RM mcg/mL 11.10     Estimated Creatinine Clearance: 129.5 mL/min (A) (by C-G formula based on SCr of 0.32 mg/dL (L)).      Microbiology:  Blood Culture   Date Value Ref Range Status   03/05/2022 Gram Positive Cocci (C)  Preliminary     BCID, PCR   Date Value Ref Range Status   03/05/2022 (A) Negative by BCID PCR. Culture to Follow. Final    Enterococcus faecalis. Ashley/B (vancomycin resistance gene) not detected. Identification by  BCID2 PCR.     No results found for: CULTURES, HSVCX, URCX  No results found for: EYECULTURE, GCCX, HSVCULTURE, LABHSV  No results found for: LEGIONELLA, MRSACX, MUMPSCX, MYCOPLASCX  No results found for: NOCARDIACX, STOOLCX  Urine Culture   Date Value Ref Range Status   03/05/2022 No growth  Preliminary     No results found for: VIRALCULTU, WOUNDCX        Radiology:  Imaging Results (Last 72 Hours)     Procedure Component Value Units Date/Time    XR Chest 1 View [[id number]] Resulted: 03/08/22 0538     Updated: 03/08/22 0539    CT Abdomen Pelvis With & Without Contrast [[id number]] Collected: 03/08/22 0012     Updated: 03/08/22 0035    Narrative:      DATE OF EXAM: 3/7/2022 6:59 PM     PROCEDURE: CT ABDOMEN PELVIS W WO CONTRAST-     INDICATIONS: Sepsis; J18.9-Pneumonia, unspecified organism;  I48.91-Unspecified atrial fibrillation; A41.9-Sepsis, unspecified  organism; I95.9-Hypotension, unspecified; R09.02-Hypoxemia;  R13.10-Dysphagia, unspecified     COMPARISON: CT abdomen and pelvis 3/3/2022     TECHNIQUE: Routine transaxial slices were obtained through the abdomen  without the administration of intravenous contrast. Additional scanning  through the abdomen and pelvis followed by the intravenous  administration of 100 mL of Isovue 300. Positive oral contrast was  administered. Reconstructed coronal and sagittal images were also  obtained. Automated exposure control and iterative construction methods  were used.     The radiation dose reduction device was turned on for each scan per the  ALARA (As Low as Reasonably Achievable) protocol.     FINDINGS:     Worsened bibasilar infiltrates concerning for pneumonia/aspiration.  Partially imaged large hiatal hernia. Redemonstration of extensive  cystic change of the liver and the lesser extent the kidneys. Some of  the liver cyst demonstrate peripheral calcifications. Unchanged splenic  cyst. Unremarkable appearance of the pancreas. The adrenal glands appear  unremarkable.  No hydronephrosis or nephrolithiasis. The urinary bladder  is decompressed and not well evaluated. Redemonstration of numerous  prominent mildly dilated loops of small bowel. Many of the loops in the  lower abdomen and pelvis demonstrate marked wall thickening/submucosal  edema. A discrete transition point is not confidently identified.  Positive oral contrast is seen within the colon. A small amount of  ascites. No pneumoperitoneum. There is mesenteric hyperemia. Ectatic  abdominal aorta without aneurysm. Otherwise unremarkable appearance of  the vasculature. The bones are severely demineralized. Severe  degenerative change of the left hip. No acute osseous findings. Right  hip cephalomedullary construct.       Impression:         Worsened bibasilar infiltrates concerning for pneumonia/aspiration.     Numerous dilated loops of small bowel, many of which demonstrate  prominent wall thickening/submucosal edema and mesenteric hyperemia.  Infectious or inflammatory enteritis cannot be excluded. No definite  small bowel obstruction with transition point. Positive oral contrast  reaches the colon.     Small ascites may be related to the above.     Sequelae of polycystic kidney and liver disease.     Redemonstration of large hiatal hernia/intrathoracic stomach.     This report was finalized on 3/8/2022 12:32 AM by Tejas Shaver MD.       XR Chest 1 View [[id number]] Collected: 03/07/22 0703     Updated: 03/07/22 0708    Narrative:         DATE OF EXAM:   3/7/2022 4:10 AM     PROCEDURE:   XR CHEST 1 VW-     INDICATIONS:   f/u respiratory illness.; J18.9-Pneumonia, unspecified organism;  I48.91-Unspecified atrial fibrillation; A41.9-Sepsis, unspecified  organism; I95.9-Hypotension, unspecified; R09.02-Hypoxemia     COMPARISON:  03/06/2022      TECHNIQUE:   Portable chest radiograph.     FINDINGS:   Placement of an esophagogastric tube with the tip within the patient's  large hiatal hernia/intrathoracic stomach. There is a  right upper  extremity PICC line with the tip at the low SVC. Multifocal patchy  airspace disease throughout the lungs unchanged likely relating to  pneumonia. No pneumothorax. No large effusion. Plate and screw fixation  noted at the left proximal ureters.       Impression:      1. Placement of esophagogastric tube within the patient's large hiatal  hernia/intrathoracic stomach.  2. Stable right upper extremity PICC line.  3. Patchy multifocal airspace disease throughout the lungs concerning  for pneumonia, stable.  4. No pneumothorax.     This report was finalized on 3/7/2022 7:05 AM by Alex Roe MD.       XR Abdomen KUB [[id number]] Collected: 03/06/22 1339     Updated: 03/06/22 1410    Narrative:      DATE OF EXAM: 3/6/2022 1:14 PM     PROCEDURE: XR ABDOMEN KUB-     INDICATIONS: vomiting, tube placement; J18.9-Pneumonia, unspecified  organism; I48.91-Unspecified atrial fibrillation; A41.9-Sepsis,  unspecified organism; I95.9-Hypotension, unspecified; R09.02-Hypoxemia     COMPARISON: 3/2/2020     TECHNIQUE: Single radiographic view of the abdomen was obtained.     FINDINGS:  There is persistent dilatation of bowel loops in the left abdomen and  upper quadrant. No pneumatosis is identified. No gross evidence of free  air is seen. There is excreted contrast material within the right renal  collecting system and urinary bladder. Atherosclerotic vascular  calcification is noted diffusely. There is degenerative change in the  spine. Severe degenerative change in the left hip. Right hip lag screw  and IM orin incidentally noted..        Impression:         1. Persistent nonspecific dilatation of bowel loops in the left upper  quadrant and left abdomen.  2. There is excreted contrast material remaining in the right renal  collecting system and bladder. This may indicate renal dysfunction given  the length of time since patient's contrasted study 8:00 PM last night.  Correlate clinically.     This report was finalized  on 3/6/2022 1:44 PM by Brennen Garcia MD.       XR Chest 1 View [[id number]] Collected: 03/06/22 1119     Updated: 03/06/22 1123    Narrative:         DATE OF EXAM:   3/6/2022 11:16 AM     PROCEDURE:   XR CHEST 1 VW-     INDICATIONS:   picc placement; J18.9-Pneumonia, unspecified organism;  I48.91-Unspecified atrial fibrillation; A41.9-Sepsis, unspecified  organism; I95.9-Hypotension, unspecified; R09.02-Hypoxemia     COMPARISON:  03/05/2022 at 7:19 PM     TECHNIQUE:   [Portable chest radiograph]     FINDINGS:   Multifocal infiltrates are again seen throughout the lungs bilaterally.  These changes are stable. A large hiatal hernia is again noted. The  cardiac silhouette and mediastinum are stable. A right PICC line is  noted. The distal tip partially extends into the right atrium. Recommend  retraction of the line by approximately 2 cm.        Impression:      1.  The right PICC line distal tip partially extends into the right  atrium. Recommend retraction of the line by approximately 2 cm.  2.  Stable multifocal airspace infiltrates bilaterally.     This report was finalized on 3/6/2022 11:20 AM by Kemar Armijo MD.       CT Angiogram Chest [[id number]] Collected: 03/05/22 2124     Updated: 03/05/22 2131    Narrative:      CT ANGIOGRAM OF THE CHEST    INDICATION: Hypotension, leukopenia, shortness of breath    TECHNIQUE: CT scan of the chest was performed following the administration of 80 mL Isovue-370 intravenous contrast. Imaging was performed to optimize evaluation of the pulmonary arterial system. CT dose lowering techniques were used, to include:   automated exposure control, adjustment for patient size, and / or use of iterative reconstruction. MIP images were also generated.    COMPARISON: 2/24/2022    FINDINGS:  Vasculature: There are no filling defects in the central, lobar, proximal segmental or distal segmental pulmonary arteries. There is no evidence of right heart strain or pulmonary infarction. Coronary  artery calcifications are present. The heart is   enlarged.    Mediastinum / Pleura: There is no pericardial or pleural effusion. There is no mediastinal, hilar or axillary lymphadenopathy. A large hiatal hernia is present, with intrathoracic stomach. The esophagus is filled with fluid along its length.    Airways / Lungs: The central airways are patent.  There is no pneumothorax. There is near complete consolidation of both lower lobes. Patchy airspace disease is present within the aerated upper lobes and right middle lobe.    Bones: No destructive osseous lesion is identified. A chronic L1 deformity is again seen.    Upper Abdomen: Numerous cystic lesions are present in the liver. Calcified lesions are also seen. A cystic lesion is also present in the spleen.      Impression:        1. No evidence of pulmonary embolism.   2. Patchy consolidative airspace disease in both lungs, with near complete consolidation of the lower lobes. Findings likely represent pneumonia.  3. Large hiatal hernia with intrathoracic stomach. The esophagus is also filled with fluid, suggesting gastroesophageal reflux.  4. Cardiomegaly and coronary artery atherosclerosis.  5. Chronic changes below the diaphragm involving the liver and spleen.    Electronically signed by:  Tay Funez M.D.    3/5/2022 7:30 PM Mountain Time    XR Chest 1 View [[id number]] Collected: 03/05/22 1950     Updated: 03/05/22 1954    Narrative:      DATE OF EXAM: 3/5/2022 7:20 PM     PROCEDURE: XR CHEST 1 VW-     INDICATIONS: SOA triage protocol     COMPARISON: 2/22/2022     TECHNIQUE: Single radiographic AP view of the chest was obtained.     FINDINGS:  Low lung volumes. Large hiatal hernia which partially obscures the heart  border. There is probable cardiomegaly. Central pulmonary vessels are  prominent. There are perihilar airspace opacities bilaterally.        Impression:      Low lung volumes. There is cardiomegaly with vascular congestion. There  are  bilateral perihilar airspace opacities that likely represent edema,  less likely pneumonia     This report was finalized on 3/5/2022 7:51 PM by Terrell Marx.         I read her radiographic studies      Echocardiogram 3/7/22:  Interpretation Summary    · Left ventricular wall thickness is consistent with mild to moderate basal asymmetric hypertrophy.  · Estimated left ventricular EF = 75% Left ventricular ejection fraction appears to be greater than 70%. Left ventricular systolic function is hyperdynamic (EF > 70%).  · Moderate mitral valve regurgitation is present with an eccentric jet noted.  · Mild aortic valve stenosis is present.  · The right atrial cavity is mild to moderately dilated.  · Estimated right ventricular systolic pressure from tricuspid regurgitation is mildly elevated (35-45 mmHg). Calculated right ventricular systolic pressure from tricuspid regurgitation is 40 mmHg.  · Left atrial volume is severely increased.  · Systolic anterior motion of the anterior mitral leaflet is present and a nonmeasured gradient is present at rest.  · The findings are consistent with obstructive, hypertrophic cardiomyopathy.  · Mild pulmonary hypertension is present.  · There is a trivial circumferential pericardial effusion.           Impression:   1.  Enterococcal bacteremia-I am concerned about potential enterococcal endocarditis since she has underlying significant valvular heart disease.  This is not due to pneumonia as Enterococcus does not cause pneumonia.  UTI and intra-abdominal infection would be other potential sources.  She does not appear to have biliary disease as a source.  She clearly has aspiration pneumonia but as noted above this does not explain the enterococcal bacteremia.  I suspect she has both aspiration pneumonia and possible underlying endocarditis.  I will readjust her antibiotic regimen to intravenous Unasyn and ceftriaxone pending further evaluation.  Strongyloides hyperinfection syndrome  would be another potential source for enterococcal bacteremia although she was only on 5 mg of prednisone which is not generally enough immunosuppression to result in strongyloidiasis hyper infection syndrome.  We will check a strongyloides antibody.  If a duodenal feeding tube is placed, I would like to have a duodenal aspirate sent for ova and parasite exam.  Her TTE from 3/7 reveals valvular heart disease but no definite endocarditis.  I would like to proceed with a KHARI.  2.  Aspiration pneumonia   3.  Recent bladder prolapse surgery- plates and screws noted on Xray left  proximal uteters  4.  Large Hiatal hernia- with 3/4 of stomach in thoracic cavity   5.  Valvular heart disease-she has significant valvular heart disease with a loud murmur.  We will need to evaluate for endocarditis as noted above.  6.  Rheumatoid arthritis-on chronic prednisone therapy   7.  Polycystic kidney and liver disease  8.  Atrial fibrillation  9.  Leukopenia   10.  Immunocompromised host   11.  Pyuria/? UTI  12.  Acute hypoxic respiratory failure-improving    PLAN/RECOMMENDATIONS:  1.  Repeat blood culture  2.  Continue ceftriaxone 2 g IV every 12 hours  3.  Continue intravenous Unasyn  4.  Strongyloides serology   5.  KHARI  6.  Obtain a duodenal aspirate for ova and parasites if a duodenal feeding tube is placed    She is critically ill with a high mortality risk.  I spent over 40 minutes on her complex care today.  I discussed her complex situation in detail with the patient herself today.    Clovis Art MD  3/8/2022  07:15 EST

## 2022-03-08 NOTE — PROGRESS NOTES
"INTENSIVIST NOTE     Hospital Day: 3    Ms. Karen Larkin, 66 y.o. female is followed for:   Sepsis and pneumonia       SUBJECTIVE     Interval history:    Awake alert and oriented. Fluid balance positive. Passed dysphagia evaluation this morning.    The patient's relevant past medical, surgical and social history were reviewed and updated in Epic as appropriate.       OBJECTIVE     Vital Sign Min/Max for last 24 hours  Temp  Min: 97.4 °F (36.3 °C)  Max: 98.8 °F (37.1 °C)   BP  Min: 95/66  Max: 142/97   Pulse  Min: 86  Max: 113   Resp  Min: 14  Max: 20   SpO2  Min: 90 %  Max: 98 %   No data recorded   No data recorded      Intake/Output Summary (Last 24 hours) at 3/8/2022 1127  Last data filed at 3/8/2022 1124  Gross per 24 hour   Intake 3636 ml   Output 110 ml   Net 3526 ml      Flowsheet Rows    Flowsheet Row First Filed Value   Admission Height 142.2 cm (56\") Documented at 03/05/2022 1858   Admission Weight 42.6 kg (94 lb) Documented at 03/05/2022 1858             03/05/22  1858 03/06/22  0044   Weight: 42.6 kg (94 lb) 46.2 kg (101 lb 13.6 oz)            Objective:  General Appearance:  In no acute distress.    Vital signs: (most recent): Blood pressure 118/93, pulse 105, temperature 98.8 °F (37.1 °C), temperature source Axillary, resp. rate 20, height 142.2 cm (56\"), weight 46.2 kg (101 lb 13.6 oz), SpO2 93 %.    HEENT: Normal HEENT exam.  (Nasal cannula O2)    Lungs:  Normal effort and normal respiratory rate.  She is not in respiratory distress.  There are rales and decreased breath sounds.  No wheezes.    Heart: Normal rate.  Regular rhythm.  S1 normal and S2 normal.  No murmur, gallop or friction rub.   Chest: Symmetric chest wall expansion.   Abdomen: Abdomen is soft and non-distended.  Bowel sounds are normal.   There is no abdominal tenderness.   There is no mass. There is no splenomegaly. There is no hepatomegaly.   Extremities: There is no deformity or dependent edema.    Neurological: Patient is alert " and oriented to person, place and time.    Pupils:  Pupils are equal, round, and reactive to light.    Skin:  Warm and dry.              I reviewed the patient's new clinical results.  I reviewed the patient's new imaging results/reports including actual images and agree with reports.    CT Abdomen Pelvis With & Without Contrast    Result Date: 3/8/2022   Worsened bibasilar infiltrates concerning for pneumonia/aspiration.  Numerous dilated loops of small bowel, many of which demonstrate prominent wall thickening/submucosal edema and mesenteric hyperemia. Infectious or inflammatory enteritis cannot be excluded. No definite small bowel obstruction with transition point. Positive oral contrast reaches the colon.  Small ascites may be related to the above.  Sequelae of polycystic kidney and liver disease.  Redemonstration of large hiatal hernia/intrathoracic stomach.  This report was finalized on 3/8/2022 12:32 AM by Tejas Shaver MD.      XR Chest 1 View    Result Date: 3/8/2022   1. No significant change in multifocal airspace disease throughout the lungs concerning for pneumonia. 2. Esophagogastric tube stable within the patient's large hiatal hernia/intrathoracic stomach. 3. Stable right upper extremity PICC line. No pneumothorax.  This report was finalized on 3/8/2022 7:17 AM by Alex Roe MD.      XR Chest 1 View    Result Date: 3/7/2022  1. Placement of esophagogastric tube within the patient's large hiatal hernia/intrathoracic stomach. 2. Stable right upper extremity PICC line. 3. Patchy multifocal airspace disease throughout the lungs concerning for pneumonia, stable. 4. No pneumothorax.  This report was finalized on 3/7/2022 7:05 AM by Alex Roe MD.      XR Abdomen KUB    Result Date: 3/6/2022   1. Persistent nonspecific dilatation of bowel loops in the left upper quadrant and left abdomen. 2. There is excreted contrast material remaining in the right renal collecting system and bladder. This may  indicate renal dysfunction given the length of time since patient's contrasted study 8:00 PM last night. Correlate clinically.  This report was finalized on 3/6/2022 1:44 PM by Brennen Garcia MD.         INFUSIONS  Pharmacy Consult,         Results from last 7 days   Lab Units 03/08/22  0323 03/07/22  0432 03/07/22  0307   WBC 10*3/mm3 2.76* 3.20* 2.66*   HEMOGLOBIN g/dL 9.6* 10.3* 8.8*   HEMATOCRIT % 30.6* 31.5* 28.2*   PLATELETS 10*3/mm3 150 185 152     Results from last 7 days   Lab Units 03/08/22  0323 03/07/22  1515 03/07/22  0432 03/07/22  0307   SODIUM mmol/L 143 144 142 145   POTASSIUM mmol/L 3.3* 3.3* 2.9* 2.4*   CHLORIDE mmol/L 116* 113* 109* 116*   CO2 mmol/L 20.0* 20.0* 23.0 19.0*   BUN mg/dL 14 20 23 19   GLUCOSE mg/dL 290* 125* 142* 880*   CREATININE mg/dL 0.32* 0.32* 0.38* 0.36*   MAGNESIUM mg/dL 1.8  --  2.1 1.7   CALCIUM mg/dL 7.9* 8.2* 8.5* 6.7*         Results from last 7 days   Lab Units 03/05/22  2242   PH, ARTERIAL pH units 7.450   PCO2, ARTERIAL mm Hg 31.1*   PO2 ART mm Hg 93.9   FIO2 % 80         Mechanical Ventilator:   Settings: Observed:                                                I reviewed the patient's medications.    Assessment/Plan   ASSESSMENT/PLAN     Active Hospital Problems    Diagnosis    • **Severe sepsis (HCC)    • Aspiration pneumonia (HCC)    • Acute respiratory failure with hypoxia (HCC)    • Elevated brain natriuretic peptide (BNP) level    • Elevated BUN    • Dehydration    • Elevated alkaline phosphatase level    • Serum total bilirubin elevated    • Leukopenia    • PAF (paroxysmal atrial fibrillation) (HCC)      On Xarelto     • Rheumatoid arthritis      On chronic Prednisone     • Hiatal hernia with gastroesophageal reflux        66-year-old female with a past medical history that includes RA on chronic prednisone, PAF on Xarelto, hypertension, dyslipidemia, GERD, iron deficiency, status post recent bladder prolapse repair at [facility], and anemia.  She was at  [facility] and was sent to our ER for shortness of breath and generalized weakness.  She has had several recent admissions including admission here recently for hematemesis in which she was found to have a large paraesophageal hernia as well as erosive gastric reflux disease.  She underwent CT of the chest which revealed no PE but significant consolidation of both lower lobes with debris in the esophagus.  She was given vancomycin and Zosyn and sepsis bolus protocol.    Blood cultures have subsequently grown Enterococcus faecalis with sensitivities pending.    Enterococcus faecalis sepsis  · ID input appreciated. Concern for source other than pulmonary given species. Changed to Unasyn and Rocephin.  · TTE without evidence of vegetations so plan on proceeding with KHARI  Aspiration pneumonia  · Continue current antibiotics  · Await any further cultures  Large hiatal hernia and reflux  · Likely cause of aspiration pneumonia  Rheumatoid arthritis on chronic steroids  · Weaning stress steroids back to normal baseline dose.  · Immunosuppressed    Okay for telemetry transfer.     I discussed the patient's findings and my recommendations with patient, nursing staff and consulting provider     Plan of care and goals reviewed with multidisciplinary team at daily rounds.    High level of risk due to:  illness with threat to life or bodily function.    Vikram Benitez MD  Pulmonary and Critical Care Medicine  03/08/22 11:27 EST

## 2022-03-08 NOTE — PLAN OF CARE
Goal Outcome Evaluation:  Plan of Care Reviewed With: patient        Progress: improving     SLP re-evaluation completed. No s/s aspiration. Known esophageal dysphagia r/t GERD/hernia. RD to assist with food preferences so as not to over restrict her diet. Will f/u to ensure no further needs. Please see note for further details and recommendations.

## 2022-03-08 NOTE — PLAN OF CARE
Goal Outcome Evaluation:               Patient VSS, neuro intact, BM x6 this shift. Patient still on room air, Afib on monitor, palpable pulses, SBP 90-140s, UOP unmeasured but frequent with stool episodes. Ice chips tolerated. No nausea or vomiting.

## 2022-03-08 NOTE — THERAPY EVALUATION
Patient Name: Karen Larkin  : 1955    MRN: [id number]                              Today's Date: 3/8/2022       Admit Date: 3/5/2022    Visit Dx:     ICD-10-CM ICD-9-CM   1. Multifocal pneumonia  J18.9 486   2. Atrial fibrillation with RVR (HCC)  I48.91 427.31   3. Sepsis, due to unspecified organism, unspecified whether acute organ dysfunction present (HCC)  A41.9 038.9     995.91   4. Hypotension, unspecified hypotension type  I95.9 458.9   5. Hypoxemia  R09.02 799.02   6. Dysphagia, unspecified type  R13.10 787.20     Patient Active Problem List   Diagnosis   • Severe sepsis (HCC)   • Hiatal hernia with gastroesophageal reflux   • Rheumatoid arthritis   • Immunosuppression due to chronic steroid use   • Atrial fibrillation (HCC)   • Polycystic liver disease   • Chronic pain on Narcotics   • Pelvic prolapse   • Severe malnutrition (CMS/HCC)   • Coffee ground emesis   • Essential hypertension   • PAF (paroxysmal atrial fibrillation) (HCC)   • Chest pain, atypical   • Infarction of parietal lobe (HCC)   • Elevated brain natriuretic peptide (BNP) level   • Elevated BUN   • Dehydration   • Elevated alkaline phosphatase level   • Serum total bilirubin elevated   • Leukopenia   • Aspiration pneumonia (HCC)   • Acute respiratory failure with hypoxia (HCC)     Past Medical History:   Diagnosis Date   • Anemia    • Arthritis     Rheumatoid   • GERD (gastroesophageal reflux disease)    • Hiatal hernia    • Liver disease    • Murmur    • Rheumatoid arthritis (HCC)    • Varicose veins of both lower extremities      Past Surgical History:   Procedure Laterality Date   • ENDOSCOPY N/A 2022    Procedure: ESOPHAGOGASTRODUODENOSCOPY;  Surgeon: Brunner, Mark I, MD;  [facility];  Service: Gastroenterology;  Laterality: N/A;   • HERNIA REPAIR     • HIP SURGERY Right    • SHOULDER SURGERY Left       General Information     Row Name 22 1320          Physical Therapy Time and Intention    Document  Type evaluation  -KENNETH     Mode of Treatment physical therapy  -KENNETH     Row Name 03/08/22 1320          General Information    Patient Profile Reviewed yes  -KENNETH     Prior Level of Function independent:;bed mobility;ADL's;gait;transfer  -KENNETH     Existing Precautions/Restrictions fall;cardiac  -KENNETH     Barriers to Rehab medically complex;previous functional deficit  patient has been in and out of the Hospitals in [place] and rehab for the last 2 months with dependent mobility  -KENNETH     Row Name 03/08/22 1320          Living Environment    People in Home spouse  -KENNETH     Row Name 03/08/22 1320          Cognition    Orientation Status (Cognition) oriented x 4  -KENNETH     Row Name 03/08/22 1320          Safety Issues, Functional Mobility    Safety Issues Affecting Function (Mobility) safety precautions follow-through/compliance;safety precaution awareness;judgment  -KENNETH     Impairments Affecting Function (Mobility) balance;endurance/activity tolerance;shortness of breath;strength;postural/trunk control  -KENNETH           User Key  (r) = Recorded By, (t) = Taken By, (c) = Cosigned By    Initials Name Provider Type    KENNETH Corry Munoz, PT Physical Therapist               Mobility     Row Name 03/08/22 1322          Bed Mobility    Bed Mobility rolling left;rolling right;scooting/bridging;supine-sit  -KENNETH     Rolling Left [place] (Bed Mobility) minimum assist (75% patient effort)  -KENNETH     Rolling Right [place] (Bed Mobility) minimum assist (75% patient effort)  -KENNETH     Scooting/Bridging [place] (Bed Mobility) moderate assist (50% patient effort)  -KENNETH     Supine-Sit [place] (Bed Mobility) moderate assist (50% patient effort);2 person assist  -KENNETH     Assistive Device (Bed Mobility) draw sheet;head of bed elevated  -KENNETH     Comment, (Bed Mobility) patient requires cues for sequencing she needs mod assist with trunk  -KENNETH     Row Name 03/08/22 1322          Transfers    Comment, (Transfers) cues for upright posture and to keep base of  support wide patient tends to get feet crossed she has difficulty maintaining erect posture she tends to lean to the right  -KENNETH     Row Name 03/08/22 1322          Bed-Chair Transfer    Bed-Chair [place] (Transfers) 2 person assist;maximum assist (25% patient effort)  -KENNETH     Assistive Device (Bed-Chair Transfers) walker, front-wheeled  -KENNETH     Row Name 03/08/22 1322          Sit-Stand Transfer    Sit-Stand [place] (Transfers) moderate assist (50% patient effort);2 person assist  -KENNETH     Assistive Device (Sit-Stand Transfers) walker, front-wheeled  -KENNETH     Row Name 03/08/22 1322          Gait/Stairs (Locomotion)    [place] Level (Gait) maximum assist (25% patient effort);2 person assist;1 person to manage equipment  -KENNETH     Assistive Device (Gait) walker, front-wheeled  -KENNETH     Distance in Feet (Gait) 3  -KENNETH     Deviations/Abnormal Patterns (Gait) weight shifting decreased;stride length decreased;scissoring  -KENNETH     Comment, (Gait/Stairs) patient leaning to the right in standing 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 patient unable to get base of support wide enough for good balance and gets her feet crossed when trying to take steps. patient was able to pivot to the chair  -KENNETH           User Key  (r) = Recorded By, (t) = Taken By, (c) = Cosigned By    Initials Name Provider Type    Corry Peralta, PT Physical Therapist               Obj/Interventions     Row Name 03/08/22 1327          Range of Motion Comprehensive    General Range of Motion upper extremity range of motion deficits identified;lower extremity range of motion deficits identified  -KENNETH     Comment, General Range of Motion decreased right UE movement in all planes, decreased left LE knee flexion  -KENNETH     Row Name 03/08/22 1327          Strength Comprehensive (MMT)    General Manual Muscle Testing (MMT) Assessment lower extremity strength deficits identified  -KENNETH     Comment, General Manual Muscle Testing (MMT)  Assessment bilat LE strength grossly 3/5  -KENNETH     Row Name 03/08/22 1327          Motor Skills    Therapeutic Exercise hip;knee  -KENNETH     Row Name 03/08/22 1327          Hip (Therapeutic Exercise)    Hip (Therapeutic Exercise) AAROM (active assistive range of motion)  -KENNETH     Hip AAROM (Therapeutic Exercise) bilateral;flexion;extension;10 repetitions;sitting  -KENNETH     Row Name 03/08/22 1327          Knee (Therapeutic Exercise)    Knee (Therapeutic Exercise) AAROM (active assistive range of motion)  -KENNETH     Knee AAROM (Therapeutic Exercise) bilateral;flexion;extension;10 repetitions;sitting  -KENNETH     Row Name 03/08/22 1327          Balance    Balance Assessment sitting static balance;sitting dynamic balance;standing static balance;standing dynamic balance  -KENNETH     Static Sitting Balance minimal assist  -KENNETH     Dynamic Sitting Balance moderate assist  -KENNETH     Position, Sitting Balance supported;sitting edge of bed  -KENNETH     Static Standing Balance moderate assist;2-person assist  -KENNETH     Dynamic Standing Balance maximum assist;2-person assist  -KENNETH     Position/Device Used, Standing Balance supported;walker, rolling  -KENNETH     Balance Interventions sitting;standing;static;weight shifting activity  -KENNETH           User Key  (r) = Recorded By, (t) = Taken By, (c) = Cosigned By    Initials Name Provider Type    KENNETH Corry Munoz A, PT Physical Therapist               Goals/Plan     Row Name 03/08/22 2890          Bed Mobility Goal 1 (PT)    Activity/Assistive Device (Bed Mobility Goal 1, PT) bed mobility activities, all  -KENNETH     [place] Level/Cues Needed (Bed Mobility Goal 1, PT) independent  -KENNETH     Time Frame (Bed Mobility Goal 1, PT) long term goal (LTG);10 days  -KENNETH     Progress/Outcomes (Bed Mobility Goal 1, PT) goal ongoing  -     Row Name 03/08/22 1334          Transfer Goal 1 (PT)    Activity/Assistive Device (Transfer Goal 1, PT) sit-to-stand/stand-to-sit  -KENNETH     [place] Level/Cues Needed (Transfer Goal 1,  PT) minimum assist (75% or more patient effort)  -KENNETH     Time Frame (Transfer Goal 1, PT) long term goal (LTG);10 days  -KENNETH     Progress/Outcome (Transfer Goal 1, PT) goal ongoing  -KENNETH     Row Name 03/08/22 1334          Gait Training Goal 1 (PT)    Activity/Assistive Device (Gait Training Goal 1, PT) gait (walking locomotion)  -KENNETH     [place] Level (Gait Training Goal 1, PT) moderate assist (50-74% patient effort)  -KENNETH     Distance (Gait Training Goal 1, PT) 25  -KENNETH     Time Frame (Gait Training Goal 1, PT) long term goal (LTG);10 days  -KENNETH     Progress/Outcome (Gait Training Goal 1, PT) goal ongoing  -KENNETH     Row Name 03/08/22 1334          Patient Education Goal (PT)    Activity (Patient Education Goal, PT) HEP  -KENNETH     [place]/Cues/Accuracy (Memory Goal 2, PT) verbalizes understanding  -KENNETH     Time Frame (Patient Education Goal, PT) long term goal (LTG);10 days  -KENNETH     Progress/Outcome (Patient Education Goal, PT) goal ongoing  -KENNETH           User Key  (r) = Recorded By, (t) = Taken By, (c) = Cosigned By    Initials Name Provider Type    Corry Peralta, PT Physical Therapist               Clinical Impression     Row Name 03/08/22 1671          Pain    Pretreatment Pain Rating 0/10 - no pain  -KENNETH     Posttreatment Pain Rating 0/10 - no pain  -KENNETH     Row Name 03/08/22 1331          Plan of Care Review    Plan of Care Reviewed With patient  -KENNETH     Progress improving  -KENNETH     Outcome Evaluation PT eval is completed patient presents with sepsis and PNA. she demonstrates impaired bed mobility transfers and gait as well as decreased strength and balance both sitting and standing. patient was able to pivot to the chair with max assist. anticipate patient to need SNF placement at D/C  -KENNETH     Row Name 03/08/22 1337          Therapy Assessment/Plan (PT)    Patient/Family Therapy Goals Statement (PT) be able to walk  -KENNETH     Rehab Potential (PT) fair, will monitor progress closely  -KENNETH     Criteria for  Skilled Interventions Met (PT) yes;skilled treatment is necessary  -KENNETH     Row Name 03/08/22 1331          Vital Signs    Pre Systolic BP Rehab 118  -KENNETH     Pre Treatment Diastolic BP 80  -KENNETH     Post Systolic BP Rehab 129  -KENNETH     Post Treatment Diastolic BP 98  -KENNETH     Pretreatment Heart Rate (beats/min) 96  -KENNETH     Posttreatment Heart Rate (beats/min) 110  -KENNETH     Pre SpO2 (%) 98  -KENNETH     O2 Delivery Pre Treatment room air  -KENNETH     Post SpO2 (%) 93  -KENNETH     O2 Delivery Post Treatment room air  -KENNETH     Pre Patient Position Supine  -KENNETH     Intra Patient Position Standing  -KENNETH     Post Patient Position Sitting  -KENNETH     Row Name 03/08/22 1331          Positioning and Restraints    Pre-Treatment Position in bed  -KENNETH     Post Treatment Position chair  -KENNETH     In Chair notified nsg;reclined;sitting;call light within reach;encouraged to call for assist;exit alarm on;with nsg  -KENNETH           User Key  (r) = Recorded By, (t) = Taken By, (c) = Cosigned By    Initials Name Provider Type    Corry Peralta PT Physical Therapist               Outcome Measures     Row Name 03/08/22 1335          How much help from another person do you currently need...    Turning from your back to your side while in flat bed without using bedrails? 3  -KENNETH     Moving from lying on back to sitting on the side of a flat bed without bedrails? 2  -KENNETH     Moving to and from a bed to a chair (including a wheelchair)? 2  -KENNETH     Standing up from a chair using your arms (e.g., wheelchair, bedside chair)? 2  -KENNETH     Climbing 3-5 steps with a railing? 1  -KENNETH     To walk in hospital room? 2  -KENNETH     AM-PAC 6 Clicks Score (PT) 12  -KENNETH           User Key  (r) = Recorded By, (t) = Taken By, (c) = Cosigned By    Initials Name Provider Type    Corry Peralta PT Physical Therapist                             Physical Therapy Education                 Title: PT OT SLP Therapies (In Progress)     Topic: Physical Therapy (In Progress)     Point:  Mobility training (In Progress)     Learning Progress Summary           Patient Acceptance, E, NR by KENNETH at 3/8/2022 0910                   Point: Home exercise program (In Progress)     Learning Progress Summary           Patient Acceptance, E, NR by KENNETH at 3/8/2022 0910                   Point: Body mechanics (In Progress)     Learning Progress Summary           Patient Acceptance, E, NR by KENNETH at 3/8/2022 0910                   Point: Precautions (In Progress)     Learning Progress Summary           Patient Acceptance, E, NR by KENNETH at 3/8/2022 0910                               User Key     Initials Effective Dates Name Provider Type Discipline     06/16/21 -  Corry Munoz PT Physical Therapist PT              PT Recommendation and Plan  Planned Therapy Interventions (PT): balance training, bed mobility training, gait training, home exercise program, strengthening, transfer training  Plan of Care Reviewed With: patient  Progress: improving  Outcome Evaluation: PT eval is completed patient presents with sepsis and PNA. she demonstrates impaired bed mobility transfers and gait as well as decreased strength and balance both sitting and standing. patient was able to pivot to the chair with max assist. anticipate patient to need SNF placement at D/C     Time Calculation:    PT Charges     Row Name 03/08/22 1337             Time Calculation    Start Time 0910  -KENNETH      PT Received On 03/08/22  -KENNETH      PT Goal Re-Cert Due Date 03/18/22  -              Time Calculation- PT    Total Timed Code Minutes- PT 24 minute(s)  -KENNETH              Timed Charges    08365 - PT Therapeutic Activity Minutes 24  -KENNETH              Untimed Charges    PT Eval/Re-eval Minutes 25  -KENNETH              Total Minutes    Timed Charges Total Minutes 24  -KNENETH      Untimed Charges Total Minutes 25  -KENNETH       Total Minutes 49  -KENNETH            User Key  (r) = Recorded By, (t) = Taken By, (c) = Cosigned By    Initials Name Provider Type    KENNETH  Corry Munoz, PT Physical Therapist              Therapy Charges for Today     Code Description Service Date Service Provider Modifiers Qty    38857721045 HC PT THERAPEUTIC ACT EA 15 MIN 3/8/2022 Corry Munoz, PT GP 2    34768260028 HC PT EVAL MOD COMPLEXITY 2 3/8/2022 Corry Munoz, PT GP 1          PT G-Codes  AM-PAC 6 Clicks Score (PT): 12    Corry Munoz, PT  3/8/2022

## 2022-03-09 LAB
ALBUMIN SERPL-MCNC: 2.5 G/DL (ref 3.5–5.2)
ANION GAP SERPL CALCULATED.3IONS-SCNC: 7 MMOL/L (ref 5–15)
BASOPHILS # BLD AUTO: 0 10*3/MM3 (ref 0–0.2)
BASOPHILS NFR BLD AUTO: 0 % (ref 0–1.5)
BUN SERPL-MCNC: 11 MG/DL (ref 8–23)
BUN/CREAT SERPL: 40.7 (ref 7–25)
CALCIUM SPEC-SCNC: 8.4 MG/DL (ref 8.6–10.5)
CHLORIDE SERPL-SCNC: 113 MMOL/L (ref 98–107)
CO2 SERPL-SCNC: 23 MMOL/L (ref 22–29)
CREAT SERPL-MCNC: 0.27 MG/DL (ref 0.57–1)
DEPRECATED RDW RBC AUTO: 51.8 FL (ref 37–54)
EGFRCR SERPLBLD CKD-EPI 2021: 121.7 ML/MIN/1.73
EOSINOPHIL # BLD AUTO: 0.02 10*3/MM3 (ref 0–0.4)
EOSINOPHIL NFR BLD AUTO: 0.7 % (ref 0.3–6.2)
ERYTHROCYTE [DISTWIDTH] IN BLOOD BY AUTOMATED COUNT: 15.1 % (ref 12.3–15.4)
GLUCOSE SERPL-MCNC: 83 MG/DL (ref 65–99)
HCT VFR BLD AUTO: 29.3 % (ref 34–46.6)
HGB BLD-MCNC: 9.5 G/DL (ref 12–15.9)
IMM GRANULOCYTES # BLD AUTO: 0.05 10*3/MM3 (ref 0–0.05)
IMM GRANULOCYTES NFR BLD AUTO: 1.6 % (ref 0–0.5)
LYMPHOCYTES # BLD AUTO: 0.31 10*3/MM3 (ref 0.7–3.1)
LYMPHOCYTES NFR BLD AUTO: 10.2 % (ref 19.6–45.3)
MCH RBC QN AUTO: 30.3 PG (ref 26.6–33)
MCHC RBC AUTO-ENTMCNC: 32.4 G/DL (ref 31.5–35.7)
MCV RBC AUTO: 93.3 FL (ref 79–97)
MONOCYTES # BLD AUTO: 0.27 10*3/MM3 (ref 0.1–0.9)
MONOCYTES NFR BLD AUTO: 8.9 % (ref 5–12)
NEUTROPHILS NFR BLD AUTO: 2.39 10*3/MM3 (ref 1.7–7)
NEUTROPHILS NFR BLD AUTO: 78.6 % (ref 42.7–76)
NRBC BLD AUTO-RTO: 0 /100 WBC (ref 0–0.2)
PHOSPHATE SERPL-MCNC: 2.6 MG/DL (ref 2.5–4.5)
PLATELET # BLD AUTO: 146 10*3/MM3 (ref 140–450)
PMV BLD AUTO: 10 FL (ref 6–12)
POTASSIUM SERPL-SCNC: 3.5 MMOL/L (ref 3.5–5.2)
POTASSIUM SERPL-SCNC: 4.2 MMOL/L (ref 3.5–5.2)
RBC # BLD AUTO: 3.14 10*6/MM3 (ref 3.77–5.28)
SODIUM SERPL-SCNC: 143 MMOL/L (ref 136–145)
WBC NRBC COR # BLD: 3.04 10*3/MM3 (ref 3.4–10.8)

## 2022-03-09 PROCEDURE — 25010000002 CEFTRIAXONE PER 250 MG: Performed by: INTERNAL MEDICINE

## 2022-03-09 PROCEDURE — 63710000001 PREDNISONE PER 1 MG: Performed by: INTERNAL MEDICINE

## 2022-03-09 PROCEDURE — 84132 ASSAY OF SERUM POTASSIUM: CPT | Performed by: INTERNAL MEDICINE

## 2022-03-09 PROCEDURE — 97530 THERAPEUTIC ACTIVITIES: CPT

## 2022-03-09 PROCEDURE — 25010000002 ENOXAPARIN PER 10 MG

## 2022-03-09 PROCEDURE — 25010000002 AMPICILLIN-SULBACTAM PER 1.5 G: Performed by: INTERNAL MEDICINE

## 2022-03-09 PROCEDURE — 80069 RENAL FUNCTION PANEL: CPT | Performed by: INTERNAL MEDICINE

## 2022-03-09 PROCEDURE — 0 POTASSIUM CHLORIDE PER 2 MEQ: Performed by: NURSE PRACTITIONER

## 2022-03-09 PROCEDURE — 85025 COMPLETE CBC W/AUTO DIFF WBC: CPT | Performed by: INTERNAL MEDICINE

## 2022-03-09 PROCEDURE — 97166 OT EVAL MOD COMPLEX 45 MIN: CPT

## 2022-03-09 PROCEDURE — 87040 BLOOD CULTURE FOR BACTERIA: CPT | Performed by: NURSE PRACTITIONER

## 2022-03-09 PROCEDURE — 99232 SBSQ HOSP IP/OBS MODERATE 35: CPT | Performed by: INTERNAL MEDICINE

## 2022-03-09 RX ORDER — PREDNISONE 10 MG/1
10 TABLET ORAL
Status: DISCONTINUED | OUTPATIENT
Start: 2022-03-10 | End: 2022-03-11 | Stop reason: HOSPADM

## 2022-03-09 RX ADMIN — SUCRALFATE 1 G: 1 TABLET ORAL at 08:14

## 2022-03-09 RX ADMIN — POTASSIUM CHLORIDE 20 MEQ: 400 INJECTION, SOLUTION INTRAVENOUS at 11:12

## 2022-03-09 RX ADMIN — SODIUM CHLORIDE 3 G: 900 INJECTION INTRAVENOUS at 12:15

## 2022-03-09 RX ADMIN — Medication 5 MG: at 22:00

## 2022-03-09 RX ADMIN — SODIUM CHLORIDE 2 G: 900 INJECTION INTRAVENOUS at 06:23

## 2022-03-09 RX ADMIN — PANTOPRAZOLE SODIUM 40 MG: 40 TABLET, DELAYED RELEASE ORAL at 05:44

## 2022-03-09 RX ADMIN — SUCRALFATE 1 G: 1 TABLET ORAL at 12:15

## 2022-03-09 RX ADMIN — SUCRALFATE 1 G: 1 TABLET ORAL at 21:44

## 2022-03-09 RX ADMIN — PREDNISONE 20 MG: 20 TABLET ORAL at 08:13

## 2022-03-09 RX ADMIN — Medication 10 ML: at 21:44

## 2022-03-09 RX ADMIN — SUCRALFATE 1 G: 1 TABLET ORAL at 18:42

## 2022-03-09 RX ADMIN — ENOXAPARIN SODIUM 30 MG: 30 INJECTION SUBCUTANEOUS at 05:43

## 2022-03-09 RX ADMIN — POTASSIUM CHLORIDE 20 MEQ: 400 INJECTION, SOLUTION INTRAVENOUS at 05:44

## 2022-03-09 RX ADMIN — SODIUM CHLORIDE 3 G: 900 INJECTION INTRAVENOUS at 18:45

## 2022-03-09 RX ADMIN — SODIUM CHLORIDE 2 G: 900 INJECTION INTRAVENOUS at 21:42

## 2022-03-09 RX ADMIN — SODIUM CHLORIDE 3 G: 900 INJECTION INTRAVENOUS at 05:43

## 2022-03-09 RX ADMIN — HYDROCODONE BITARTRATE AND ACETAMINOPHEN 1 TABLET: 5; 325 TABLET ORAL at 08:13

## 2022-03-09 NOTE — PLAN OF CARE
Goal Outcome Evaluation:  Plan of Care Reviewed With: patient        Progress: no change  Outcome Evaluation: Pt performed STS x3 with modA x2 and B UE support. Pt required max cueing for upright posture and correct foot placement. Pt unable to achieve full upright posture. SPT from bed to chair with maxA x2 and B UE support. Pt continues to be limited by confusion and poor safety awareness. Continue to progress as appropriate.

## 2022-03-09 NOTE — THERAPY TREATMENT NOTE
Patient Name: Karen Larkin  : 1955    MRN: [id number]                              Today's Date: 3/9/2022       Admit Date: 3/5/2022    Visit Dx:     ICD-10-CM ICD-9-CM   1. Multifocal pneumonia  J18.9 486   2. Atrial fibrillation with RVR (HCC)  I48.91 427.31   3. Sepsis, due to unspecified organism, unspecified whether acute organ dysfunction present (HCC)  A41.9 038.9     995.91   4. Hypotension, unspecified hypotension type  I95.9 458.9   5. Hypoxemia  R09.02 799.02   6. Dysphagia, unspecified type  R13.10 787.20     Patient Active Problem List   Diagnosis   • Severe sepsis (HCC)   • Hiatal hernia with gastroesophageal reflux   • Rheumatoid arthritis   • Immunosuppression due to chronic steroid use   • Atrial fibrillation (HCC)   • Polycystic liver disease   • Chronic pain on Narcotics   • Pelvic prolapse   • Severe malnutrition (CMS/HCC)   • Coffee ground emesis   • Essential hypertension   • PAF (paroxysmal atrial fibrillation) (HCC)   • Chest pain, atypical   • Infarction of parietal lobe (HCC)   • Elevated brain natriuretic peptide (BNP) level   • Elevated BUN   • Dehydration   • Elevated alkaline phosphatase level   • Serum total bilirubin elevated   • Leukopenia   • Aspiration pneumonia (HCC)   • Acute respiratory failure with hypoxia (HCC)     Past Medical History:   Diagnosis Date   • Anemia    • Arthritis     Rheumatoid   • GERD (gastroesophageal reflux disease)    • Hiatal hernia    • Liver disease    • Murmur    • Rheumatoid arthritis (HCC)    • Varicose veins of both lower extremities      Past Surgical History:   Procedure Laterality Date   • ENDOSCOPY N/A 2022    Procedure: ESOPHAGOGASTRODUODENOSCOPY;  Surgeon: Brunner, Mark I, MD;  Location: [facility] ENDOSCOPY;  Service: Gastroenterology;  Laterality: N/A;   • HERNIA REPAIR     • HIP SURGERY Right    • SHOULDER SURGERY Left       General Information     Row Name 22 1207          Physical Therapy Time and Intention    Document  Type therapy note (daily note)  -KG     Mode of Treatment physical therapy  -KG     Row Name 03/09/22 1207          General Information    Existing Precautions/Restrictions cardiac;fall;other (see comments)  increased confusion  -KG     Barriers to Rehab medically complex;previous functional deficit;cognitive status  -KG     Row Name 03/09/22 1207          Cognition    Orientation Status (Cognition) oriented to;person  -KG     Row Name 03/09/22 1207          Safety Issues, Functional Mobility    Safety Issues Affecting Function (Mobility) ability to follow commands;awareness of need for assistance;insight into deficits/self-awareness;safety precaution awareness;safety precautions follow-through/compliance  -KG     Impairments Affecting Function (Mobility) balance;cognition;coordination;endurance/activity tolerance;motor control;motor planning;postural/trunk control;strength  -KG     Cognitive Impairments, Mobility Safety/Performance attention;awareness, need for assistance;insight into deficits/self-awareness;safety precaution awareness;safety precaution follow-through  -KG           User Key  (r) = Recorded By, (t) = Taken By, (c) = Cosigned By    Initials Name Provider Type    KG Alea Zamora, PT Physical Therapist               Mobility     Row Name 03/09/22 1208          Bed Mobility    Comment, (Bed Mobility) Pt seated EOB upon arrival. UIC at end of treatment.  -KG     Row Name 03/09/22 1208          Transfers    Comment, (Transfers) STS x3 throughout treatment. VC's for sequencing and safe hand placement. Pt required max cues for foot placement; hesitant to place R LE on floor. Cues for upright posture. Pt unable to achieve full upright posture. SPT from bed to chair.  -KG     Row Name 03/09/22 1208          Bed-Chair Transfer    Bed-Chair [place] (Transfers) maximum assist (25% patient effort);2 person assist;verbal cues  -KG     Assistive Device (Bed-Chair Transfers) other (see comments)  B UE  support  -KG     Row Name 03/09/22 1208          Sit-Stand Transfer    Sit-Stand [place] (Transfers) moderate assist (50% patient effort);2 person assist;verbal cues  -KG     Assistive Device (Sit-Stand Transfers) other (see comments)  B UE support  -KG     Row Name 03/09/22 1208          Gait/Stairs (Locomotion)    Comment, (Gait/Stairs) Ambulation deferred. Not appropriate to assess.  -KG           User Key  (r) = Recorded By, (t) = Taken By, (c) = Cosigned By    Initials Name Provider Type    KG Alea Zamora, PT Physical Therapist               Obj/Interventions     Row Name 03/09/22 1212          Balance    Balance Assessment sitting static balance;standing static balance;standing dynamic balance  -KG     Static Sitting Balance minimal assist  -KG     Position, Sitting Balance supported;sitting edge of bed  -KG     Static Standing Balance moderate assist;2-person assist  -KG     Dynamic Standing Balance maximum assist;2-person assist  -KG     Position/Device Used, Standing Balance supported  -KG           User Key  (r) = Recorded By, (t) = Taken By, (c) = Cosigned By    Initials Name Provider Type    Alea Grijalva, PT Physical Therapist               Goals/Plan    No documentation.                Clinical Impression     Row Name 03/09/22 1214          Pain    Pretreatment Pain Rating 0/10 - no pain  -KG     Posttreatment Pain Rating 0/10 - no pain  -KG     Row Name 03/09/22 1214          Plan of Care Review    Plan of Care Reviewed With patient  -KG     Progress no change  -KG     Outcome Evaluation Pt performed STS x3 with modA x2 and B UE support. Pt required max cueing for upright posture and correct foot placement. Pt unable to achieve full upright posture. SPT from bed to chair with maxA x2 and B UE support. Pt continues to be limited by confusion and poor safety awareness. Continue to progress as appropriate.  -KG     Row Name 03/09/22 1214          Vital Signs    Pre Systolic BP  Rehab 141  -KG     Pre Treatment Diastolic BP 99  -KG     Pretreatment Heart Rate (beats/min) 125  -KG     Posttreatment Heart Rate (beats/min) 120  -KG     Pre SpO2 (%) 92  -KG     O2 Delivery Pre Treatment room air  -KG     Post SpO2 (%) 93  -KG     O2 Delivery Post Treatment room air  -KG     Pre Patient Position Sitting  -KG     Intra Patient Position Standing  -KG     Post Patient Position Sitting  -KG     Row Name 03/09/22 1214          Positioning and Restraints    Pre-Treatment Position in bed  -KG     Post Treatment Position chair  -KG     In Chair reclined;call light within reach;encouraged to call for assist;with nsg;legs elevated  -KG           User Key  (r) = Recorded By, (t) = Taken By, (c) = Cosigned By    Initials Name Provider Type    Alea Grijalva, PT Physical Therapist               Outcome Measures     Row Name 03/09/22 1223 03/09/22 0800       How much help from another person do you currently need...    Turning from your back to your side while in flat bed without using bedrails? 2  -KG 3  -AA    Moving from lying on back to sitting on the side of a flat bed without bedrails? 2  -KG 2  -AA    Moving to and from a bed to a chair (including a wheelchair)? 2  -KG 2  -AA    Standing up from a chair using your arms (e.g., wheelchair, bedside chair)? 2  -KG 2  -AA    Climbing 3-5 steps with a railing? 1  -KG 1  -AA    To walk in hospital room? 1  -KG 1  -AA    AM-PAC 6 Clicks Score (PT) 10  -KG 11  -AA    Row Name 03/09/22 1223          Functional Assessment    Outcome Measure Options AM-PAC 6 Clicks Basic Mobility (PT)  -KG           User Key  (r) = Recorded By, (t) = Taken By, (c) = Cosigned By    Initials Name Provider Type    Alea Grijalva, PT Physical Therapist    Tequila Ayala RN Registered Nurse                             Physical Therapy Education                 Title: PT OT SLP Therapies (In Progress)     Topic: Physical Therapy (In Progress)     Point:  Mobility training (In Progress)     Learning Progress Summary           Patient Acceptance, E, NR by KG at 3/9/2022 0920    Acceptance, E, NR by KENNETH at 3/8/2022 0910                   Point: Home exercise program (In Progress)     Learning Progress Summary           Patient Acceptance, E, NR by KG at 3/9/2022 0920    Acceptance, E, NR by KENNETH at 3/8/2022 0910                   Point: Body mechanics (In Progress)     Learning Progress Summary           Patient Acceptance, E, NR by KG at 3/9/2022 0920    Acceptance, E, NR by KENNETH at 3/8/2022 0910                   Point: Precautions (In Progress)     Learning Progress Summary           Patient Acceptance, E, NR by KG at 3/9/2022 0920    Acceptance, E, NR by KENNETH at 3/8/2022 0910                               User Key     Initials Effective Dates Name Provider Type Discipline    KENNETH 06/16/21 -  Corry Munoz, PT Physical Therapist PT    KG 05/22/20 -  Alea Zamora PT Physical Therapist PT              PT Recommendation and Plan     Plan of Care Reviewed With: patient  Progress: no change  Outcome Evaluation: Pt performed STS x3 with modA x2 and B UE support. Pt required max cueing for upright posture and correct foot placement. Pt unable to achieve full upright posture. SPT from bed to chair with maxA x2 and B UE support. Pt continues to be limited by confusion and poor safety awareness. Continue to progress as appropriate.     Time Calculation:    PT Charges     Row Name 03/09/22 0920             Time Calculation    Start Time 0920  -KG      PT Received On 03/09/22  -KG      PT Goal Re-Cert Due Date 03/18/22  -KG              Time Calculation- PT    Total Timed Code Minutes- PT 23 minute(s)  -KG              Timed Charges    41086 - PT Therapeutic Activity Minutes 23  -KG              Total Minutes    Timed Charges Total Minutes 23  -KG       Total Minutes 23  -KG            User Key  (r) = Recorded By, (t) = Taken By, (c) = Cosigned By    Initials Name  Provider Type    KG Alea Zamora, PT Physical Therapist              Therapy Charges for Today     Code Description Service Date Service Provider Modifiers Qty    85831772288 HC PT THERAPEUTIC ACT EA 15 MIN 3/9/2022 Alea Zamora, PT GP 2          PT G-Codes  Outcome Measure Options: AM-PAC 6 Clicks Basic Mobility (PT)  AM-PAC 6 Clicks Score (PT): 10    Christy Zamora, EVANGELINA  3/9/2022

## 2022-03-09 NOTE — PLAN OF CARE
Goal Outcome Evaluation:  Plan of Care Reviewed With: patient        Progress: no change (OT evaluation)  Outcome Evaluation: OT evaluation completed. Pt presents with generalized weakness, impaired cognition, decreased balance and activity tolerance limiting independence with ADLs and functional mobility. Pt performed bed mobility with Mod A and STS with Max A x 2. Dep for parris care. Pt would benefit from IP OT and SNF at [place].

## 2022-03-09 NOTE — CASE MANAGEMENT/SOCIAL WORK
Continued Stay Note  [facility]     Patient Name: Karen Larkin  MRN: [id number]  Today's Date: 3/9/2022    Admit Date: 3/5/2022     Discharge Plan     Row Name 03/09/22 1204       Plan    Plan update    Patient/Family in Agreement with Plan yes    Plan Comments Referral also given to Marry at [facility].  She will review.    Final Discharge Disposition Code 30 - still a patient    Row Name 03/09/22 1154       Plan    Plan Home    Patient/Family in Agreement with Plan yes    Plan Comments I spoke with Mrs. Larkin in room regarding rehab placement at a skilled Nursing Facility.  She spoke with her family and now would like a referrral to [facility] [place] and one to [facility].  Referral faxed to [facility] and called to Miley at [facility].  CM following.    Final Discharge Disposition Code 03 - skilled nursing facility (SNF)               Discharge Codes    No documentation.               Expected Discharge Date and Time     Expected Discharge Date Expected Discharge Time    Mar 14, 2022             Payton Ackerman RN

## 2022-03-09 NOTE — PROGRESS NOTES
"INTENSIVIST NOTE     Hospital Day: 4    Ms. Karen Larkin, 66 y.o. female is followed for:   Sepsis and pneumonia       SUBJECTIVE     Interval history:    Awake alert and oriented.  Eating breakfast.  Afebrile.  KHARI yesterday limited by a large hiatal hernia but without clear evidence of vegetations.    The patient's relevant past medical, surgical and social history were reviewed and updated in Epic as appropriate.       OBJECTIVE     Vital Sign Min/Max for last 24 hours  Temp  Min: 97.4 °F (36.3 °C)  Max: 98.6 °F (37 °C)   BP  Min: 104/77  Max: 146/108   Pulse  Min: 70  Max: 116   Resp  Min: 14  Max: 18   SpO2  Min: 92 %  Max: 100 %   No data recorded   No data recorded      Intake/Output Summary (Last 24 hours) at 3/9/2022 1416  Last data filed at 3/9/2022 1215  Gross per 24 hour   Intake 1829.4 ml   Output 150 ml   Net 1679.4 ml      Flowsheet Rows    Flowsheet Row First Filed Value   Admission Height 142.2 cm (56\") Documented at 03/05/2022 1858   Admission Weight 42.6 kg (94 lb) Documented at 03/05/2022 1858             03/05/22  1858 03/06/22  0044   Weight: 42.6 kg (94 lb) 46.2 kg (101 lb 13.6 oz)            Objective:  General Appearance:  In no acute distress.    Vital signs: (most recent): Blood pressure 129/92, pulse 116, temperature 97.8 °F (36.6 °C), temperature source Oral, resp. rate 18, height 142.2 cm (56\"), weight 46.2 kg (101 lb 13.6 oz), SpO2 95 %.    HEENT: Normal HEENT exam.  (Nasal cannula O2)    Lungs:  Normal effort and normal respiratory rate.  She is not in respiratory distress.  There are rales and decreased breath sounds.  No wheezes.    Heart: Normal rate.  Regular rhythm.  S1 normal and S2 normal.  No murmur, gallop or friction rub.   Chest: Symmetric chest wall expansion.   Abdomen: Abdomen is soft and non-distended.  Bowel sounds are normal.   There is no abdominal tenderness.   There is no mass. There is no splenomegaly. There is no hepatomegaly.   Extremities: There is no " deformity or dependent edema.    Neurological: Patient is alert and oriented to person, place and time.    Pupils:  Pupils are equal, round, and reactive to light.    Skin:  Warm and dry.              I reviewed the patient's new clinical results.  I reviewed the patient's new imaging results/reports including actual images and agree with reports.    CT Abdomen Pelvis With & Without Contrast    Result Date: 3/8/2022   Worsened bibasilar infiltrates concerning for pneumonia/aspiration.  Numerous dilated loops of small bowel, many of which demonstrate prominent wall thickening/submucosal edema and mesenteric hyperemia. Infectious or inflammatory enteritis cannot be excluded. No definite small bowel obstruction with transition point. Positive oral contrast reaches the colon.  Small ascites may be related to the above.  Sequelae of polycystic kidney and liver disease.  Redemonstration of large hiatal hernia/intrathoracic stomach.  This report was finalized on 3/8/2022 12:32 AM by Tejas Shaver MD.      XR Chest 1 View    Result Date: 3/8/2022   1. No significant change in multifocal airspace disease throughout the lungs concerning for pneumonia. 2. Esophagogastric tube stable within the patient's large hiatal hernia/intrathoracic stomach. 3. Stable right upper extremity PICC line. No pneumothorax.  This report was finalized on 3/8/2022 7:17 AM by Alex Roe MD.         INFUSIONS       Results from last 7 days   Lab Units 03/09/22  0335 03/08/22  0323 03/07/22  0432   WBC 10*3/mm3 3.04* 2.76* 3.20*   HEMOGLOBIN g/dL 9.5* 9.6* 10.3*   HEMATOCRIT % 29.3* 30.6* 31.5*   PLATELETS 10*3/mm3 146 150 185     Results from last 7 days   Lab Units 03/09/22  0335 03/08/22  1109 03/08/22  0323 03/07/22  1515 03/07/22  0432 03/07/22  0307   SODIUM mmol/L 143  --  143 144 142 145   POTASSIUM mmol/L 3.5 4.4 3.3* 3.3* 2.9* 2.4*   CHLORIDE mmol/L 113*  --  116* 113* 109* 116*   CO2 mmol/L 23.0  --  20.0* 20.0* 23.0 19.0*   BUN mg/dL  11  --  14 20 23 19   GLUCOSE mg/dL 83  --  290* 125* 142* 880*   CREATININE mg/dL 0.27*  --  0.32* 0.32* 0.38* 0.36*   MAGNESIUM mg/dL  --   --  1.8  --  2.1 1.7   CALCIUM mg/dL 8.4*  --  7.9* 8.2* 8.5* 6.7*         Results from last 7 days   Lab Units 03/05/22  2242   PH, ARTERIAL pH units 7.450   PCO2, ARTERIAL mm Hg 31.1*   PO2 ART mm Hg 93.9   FIO2 % 80         Mechanical Ventilator:   Settings: Observed:                                                I reviewed the patient's medications.    Assessment/Plan   ASSESSMENT/PLAN     Active Hospital Problems    Diagnosis    • **Severe sepsis (HCC)    • Aspiration pneumonia (HCC)    • Acute respiratory failure with hypoxia (HCC)    • Elevated brain natriuretic peptide (BNP) level    • Elevated BUN    • Dehydration    • Elevated alkaline phosphatase level    • Serum total bilirubin elevated    • Leukopenia    • PAF (paroxysmal atrial fibrillation) (HCC)      On Xarelto     • Rheumatoid arthritis      On chronic Prednisone     • Hiatal hernia with gastroesophageal reflux        66-year-old female with a past medical history that includes RA on chronic prednisone, PAF on Xarelto, hypertension, dyslipidemia, GERD, iron deficiency, status post recent bladder prolapse repair at [facility], and anemia.  She was at [facility] and was sent to our ER for shortness of breath and generalized weakness.  She has had several recent admissions including admission here recently for hematemesis in which she was found to have a large paraesophageal hernia as well as erosive gastric reflux disease.  She underwent CT of the chest which revealed no PE but significant consolidation of both lower lobes with debris in the esophagus.  She was given vancomycin and Zosyn and sepsis bolus protocol.    Blood cultures have subsequently grown Enterococcus faecalis with sensitivities pending.    Enterococcus faecalis sepsis  · ID input appreciated. Concern for source other than  pulmonary given species. Changed to Unasyn and Rocephin.  · KHARI performed without evidence of clear vegetation but exam limited due to large hiatal hernia.  Discussed with Dr. Art and he plans on treating for potential endocarditis regardless given abnormal valvular structure and limited exam.  Aspiration pneumonia  · Continue current antibiotics  · Pulmonary toilet  Large hiatal hernia and reflux  · Likely cause of aspiration pneumonia  Rheumatoid arthritis on chronic steroids  · Weaning stress steroids back to normal baseline dose.  · Immunosuppressed    Awaiting telemetry transfer  Discussed with patient as well as  via telephone     I discussed the patient's findings and my recommendations with patient, family, nursing staff and consulting provider     Plan of care and goals reviewed with multidisciplinary team at daily rounds.    .    Vikram Benitez MD  Pulmonary and Critical Care Medicine  03/09/22 14:16 EST

## 2022-03-09 NOTE — THERAPY EVALUATION
Patient Name: Karen Larkin  : 1955    MRN: [id number]                              Today's Date: 3/9/2022       Admit Date: 3/5/2022    Visit Dx:     ICD-10-CM ICD-9-CM   1. Multifocal pneumonia  J18.9 486   2. Atrial fibrillation with RVR (HCC)  I48.91 427.31   3. Sepsis, due to unspecified organism, unspecified whether acute organ dysfunction present (HCC)  A41.9 038.9     995.91   4. Hypotension, unspecified hypotension type  I95.9 458.9   5. Hypoxemia  R09.02 799.02   6. Dysphagia, unspecified type  R13.10 787.20     Patient Active Problem List   Diagnosis   • Severe sepsis (HCC)   • Hiatal hernia with gastroesophageal reflux   • Rheumatoid arthritis   • Immunosuppression due to chronic steroid use   • Atrial fibrillation (HCC)   • Polycystic liver disease   • Chronic pain on Narcotics   • Pelvic prolapse   • Severe malnutrition (CMS/HCC)   • Coffee ground emesis   • Essential hypertension   • PAF (paroxysmal atrial fibrillation) (HCC)   • Chest pain, atypical   • Infarction of parietal lobe (HCC)   • Elevated brain natriuretic peptide (BNP) level   • Elevated BUN   • Dehydration   • Elevated alkaline phosphatase level   • Serum total bilirubin elevated   • Leukopenia   • Aspiration pneumonia (HCC)   • Acute respiratory failure with hypoxia (HCC)     Past Medical History:   Diagnosis Date   • Anemia    • Arthritis     Rheumatoid   • GERD (gastroesophageal reflux disease)    • Hiatal hernia    • Liver disease    • Murmur    • Rheumatoid arthritis (HCC)    • Varicose veins of both lower extremities      Past Surgical History:   Procedure Laterality Date   • ENDOSCOPY N/A 2022    Procedure: ESOPHAGOGASTRODUODENOSCOPY;  Surgeon: Brunner, Mark I, MD;  Location: [facility] ENDOSCOPY;  Service: Gastroenterology;  Laterality: N/A;   • HERNIA REPAIR     • HIP SURGERY Right    • SHOULDER SURGERY Left       General Information     Row Name 22 1157          OT Time and Intention    Document Type evaluation   -AN     Mode of Treatment occupational therapy  -AN     Row Name 03/09/22 1157          General Information    Patient Profile Reviewed yes  -AN     Prior Level of Function independent:;all household mobility;community mobility;gait;ADL's  -AN     Existing Precautions/Restrictions fall;cardiac  -AN     Barriers to Rehab medically complex;previous functional deficit  in and out of hospital and rehab for ~2 months  -AN     Row Name 03/09/22 1157          Living Environment    People in Home spouse  -AN     Row Name 03/09/22 1157          Home Main Entrance    Number of Stairs, Main Entrance none;other (see comments)  ramp to enter  -AN     Row Name 03/09/22 1157          Stairs Within Home, Primary    Stairs, Within Home, Primary 1 level  -AN     Row Name 03/09/22 1157          Cognition    Orientation Status (Cognition) oriented x 4  -AN     Row Name 03/09/22 1157          Safety Issues, Functional Mobility    Impairments Affecting Function (Mobility) balance;endurance/activity tolerance;shortness of breath;strength;postural/trunk control  -AN           User Key  (r) = Recorded By, (t) = Taken By, (c) = Cosigned By    Initials Name Provider Type    AN Tresa Shipley OT Occupational Therapist                 Mobility/ADL's     Row Name 03/09/22 1159          Bed Mobility    Bed Mobility supine-sit;scooting/bridging  -AN     Scooting/Bridging G[place] (Bed Mobility) moderate assist (50% patient effort);verbal cues;nonverbal cues (demo/gesture)  -AN     Supine-Sit [place] (Bed Mobility) verbal cues;nonverbal cues (demo/gesture);moderate assist (50% patient effort)  -AN     Bed Mobility, Safety Issues decreased use of arms for pushing/pulling;decreased use of legs for bridging/pushing;impaired trunk control for bed mobility  -AN     Assistive Device (Bed Mobility) draw sheet;head of bed elevated  -AN     Comment, (Bed Mobility) cues for sequencing of steps and reaching for bed rail  -AN     Row Name 03/09/22  1159          Transfers    Transfers sit-stand transfer;stand-sit transfer  -AN     Comment, (Transfers) cues for transfer technique and hand placement  -AN     Sit-Stand Turner (Transfers) 2 person assist;verbal cues;nonverbal cues (demo/gesture);maximum assist (25% patient effort)  -AN     Stand-Sit [place] (Transfers) verbal cues;nonverbal cues (demo/gesture);moderate assist (50% patient effort);2 person assist  -AN     Row Name 03/09/22 1159          Functional Mobility    Functional Mobility- Ind. Level not tested  -AN     Row Name 03/09/22 1159          Activities of Daily Living    BADL Assessment/Intervention lower body dressing;toileting  -AN     Row Name 03/09/22 1159          Lower Body Dressing Assessment/Training    Turner Level (Lower Body Dressing) don;socks;maximum assist (25% patient effort)  -AN     Position (Lower Body Dressing) edge of bed sitting  -AN     Row Name 03/09/22 1159          Toileting Assessment/Training    Turner Level (Toileting) adjust/manage clothing;change pad/brief;perform perineal hygiene;dependent (less than 25% patient effort)  -AN     Position (Toileting) supported standing  -AN     Comment, (Toileting) incontinent bowel episode in standing requiring parris care and gurjit pad change  -AN           User Key  (r) = Recorded By, (t) = Taken By, (c) = Cosigned By    Initials Name Provider Type    Tresa Santoyo OT Occupational Therapist               Obj/Interventions     Row Name 03/09/22 1202          Sensory Assessment (Somatosensory)    Sensory Assessment (Somatosensory) UE sensation intact  -AN     Row Name 03/09/22 1202          Vision Assessment/Intervention    Visual Impairment/Limitations WFL  -AN     [facility] Name 03/09/22 1202          Range of Motion Comprehensive    General Range of Motion bilateral upper extremity ROM WFL  -AN     [facility] Name 03/09/22 1202          Strength Comprehensive (MMT)    General Manual Muscle Testing (MMT) Assessment  upper extremity strength deficits identified  -AN     Comment, General Manual Muscle Testing (MMT) Assessment BUE grossly 3+/5  -AN     Row Name 03/09/22 1202          Motor Skills    Motor Skills functional endurance  -AN     Functional Endurance decreased activity tolerance  -AN     Row Name 03/09/22 1202          Balance    Balance Assessment sitting static balance;sitting dynamic balance;sit to stand dynamic balance;standing static balance;standing dynamic balance  -AN     Static Sitting Balance verbal cues;minimal assist  -AN     Dynamic Sitting Balance minimal assist;verbal cues  -AN     Position, Sitting Balance supported;sitting edge of bed  -AN     Sit to Stand Dynamic Balance verbal cues;non-verbal cues (demo/gesture);2-person assist;maximum assist  -AN     Static Standing Balance 2-person assist;verbal cues;maximum assist  -AN     Balance Interventions minimal challenge;sitting;standing;sit to stand;supported;static;moderate challenge;occupation based/functional task  -AN           User Key  (r) = Recorded By, (t) = Taken By, (c) = Cosigned By    Initials Name Provider Type    AN Tresa Shipley OT Occupational Therapist               Goals/Plan     Row Name 03/09/22 1306          Bed Mobility Goal 1 (OT)    Activity/Assistive Device (Bed Mobility Goal 1, OT) sit to supine/supine to sit  -AN     [place] Level/Cues Needed (Bed Mobility Goal 1, OT) minimum assist (75% or more patient effort)  -AN     Time Frame (Bed Mobility Goal 1, OT) long term goal (LTG);10 days  -AN     Row Name 03/09/22 1306          Transfer Goal 1 (OT)    Activity/Assistive Device (Transfer Goal 1, OT) sit-to-stand/stand-to-sit;bed-to-chair/chair-to-bed;commode, bedside without drop arms  -AN     [place] Level/Cues Needed (Transfer Goal 1, OT) minimum assist (75% or more patient effort)  -AN     Time Frame (Transfer Goal 1, OT) long term goal (LTG);10 days  -AN     Row Name 03/09/22 1306          Dressing Goal 1 (OT)     Activity/Device (Dressing Goal 1, OT) lower body dressing  -AN     [place]/Cues Needed (Dressing Goal 1, OT) supervision required  -AN     Time Frame (Dressing Goal 1, OT) long term goal (LTG);10 days  -AN     Strategies/Barriers (Dressing Goal 1, OT) AE PRN  -AN     Row Name 03/09/22 1306          Therapy Assessment/Plan (OT)    Planned Therapy Interventions (OT) activity tolerance training;adaptive equipment training;BADL retraining;functional balance retraining;patient/caregiver education/training;occupation/activity based interventions;strengthening exercise;transfer/mobility retraining  -AN           User Key  (r) = Recorded By, (t) = Taken By, (c) = Cosigned By    Initials Name Provider Type    Tresa Santoyo OT Occupational Therapist               Clinical Impression     Row Name 03/09/22 1206          Pain Assessment    Additional Documentation Pain Scale: FACES Pre/Post-Treatment (Group)  -AN     Row Name 03/09/22 1206          Pain Scale: FACES Pre/Post-Treatment    Pain: FACES Scale, Pretreatment 0-->no hurt  -AN     Posttreatment Pain Rating 0-->no hurt  -AN     Pre/Posttreatment Pain Comment asymptomatic throughout  -AN     [facility] Name 03/09/22 1206          Plan of Care Review    Plan of Care Reviewed With patient  -AN     Progress no change  OT evaluation  -AN     Outcome Evaluation OT evaluation completed. Pt presents with generalized weakness, impaired cognition, decreased balance and activity tolerance limiting independence with ADLs and functional mobility. Pt performed bed mobility with Mod A and STS with Max A x 2. Dep for parris care. Pt would benefit from IP OT and SNF at [place].  -AN     Row Name 03/09/22 1206          Therapy Assessment/Plan (OT)    Patient/Family Therapy Goal Statement (OT) Return to PLOF  -AN     Rehab Potential (OT) good, to achieve stated therapy goals  -AN     Criteria for Skilled Therapeutic Interventions Met (OT) yes;skilled treatment is necessary  -AN     Therapy  Frequency (OT) daily  -AN     Row Name 03/09/22 1206          Therapy Plan Review/Discharge Plan (OT)    Anticipated Discharge Disposition (OT) skilled nursing facility  -AN     Row Name 03/09/22 1206          Vital Signs    Pre Systolic BP Rehab --  VSS  -AN     O2 Delivery Pre Treatment room air  -AN     O2 Delivery Intra Treatment room air  -AN     O2 Delivery Post Treatment room air  -AN     Pre Patient Position Supine  -AN     Intra Patient Position Standing  -AN     Post Patient Position Sitting  -AN     Row Name 03/09/22 1206          Positioning and Restraints    Pre-Treatment Position in bed  -AN     Post Treatment Position bed  -AN     In Bed notified nsg;sitting EOB;call light within reach;encouraged to call for assist;exit alarm on;with PT  -AN           User Key  (r) = Recorded By, (t) = Taken By, (c) = Cosigned By    Initials Name Provider Type    Tresa Santoyo, ALVINA Occupational Therapist               Outcome Measures     Row Name 03/09/22 1307          How much help from another is currently needed...    Putting on and taking off regular lower body clothing? 2  -AN     Bathing (including washing, rinsing, and drying) 2  -AN     Toileting (which includes using toilet bed pan or urinal) 1  -AN     Putting on and taking off regular upper body clothing 3  -AN     Taking care of personal grooming (such as brushing teeth) 3  -AN     Eating meals 3  -AN     AM-PAC 6 Clicks Score (OT) 14  -AN     Row Name 03/09/22 1223 03/09/22 0800       How much help from another person do you currently need...    Turning from your back to your side while in flat bed without using bedrails? 2  -KG 3  -AA    Moving from lying on back to sitting on the side of a flat bed without bedrails? 2  -KG 2  -AA    Moving to and from a bed to a chair (including a wheelchair)? 2  -KG 2  -AA    Standing up from a chair using your arms (e.g., wheelchair, bedside chair)? 2  -KG 2  -AA    Climbing 3-5 steps with a railing? 1  -KG 1   -AA    To walk in hospital room? 1  -KG 1  -AA    AM-PAC 6 Clicks Score (PT) 10  -KG 11  -AA    Row Name 03/09/22 1307 03/09/22 1223       Functional Assessment    Outcome Measure Options AM-PAC 6 Clicks Daily Activity (OT)  -AN AM-PAC 6 Clicks Basic Mobility (PT)  -KG          User Key  (r) = Recorded By, (t) = Taken By, (c) = Cosigned By    Initials Name Provider Type    KG Alea Zamora, PT Physical Therapist    Tequila Ayala, RN Registered Nurse    Tresa Santoyo OT Occupational Therapist                Occupational Therapy Education                 Title: PT OT SLP Therapies (In Progress)     Topic: Occupational Therapy (In Progress)     Point: ADL training (Done)     Description:   Instruct learner(s) on proper safety adaptation and remediation techniques during self care or transfers.   Instruct in proper use of assistive devices.              Learning Progress Summary           Patient Acceptance, E, VU,NR by AN at 3/9/2022 1308                   Point: Home exercise program (Not Started)     Description:   Instruct learner(s) on appropriate technique for monitoring, assisting and/or progressing therapeutic exercises/activities.              Learner Progress:  Not documented in this visit.          Point: Precautions (Done)     Description:   Instruct learner(s) on prescribed precautions during self-care and functional transfers.              Learning Progress Summary           Patient Acceptance, E, VU,NR by AN at 3/9/2022 1308                   Point: Body mechanics (Done)     Description:   Instruct learner(s) on proper positioning and spine alignment during self-care, functional mobility activities and/or exercises.              Learning Progress Summary           Patient Acceptance, E, VU,NR by AN at 3/9/2022 1308                               User Key     Initials Effective Dates Name Provider Type Discipline    AN 09/21/21 -  Tresa Shipley OT Occupational Therapist OT               OT Recommendation and Plan  Planned Therapy Interventions (OT): activity tolerance training, adaptive equipment training, BADL retraining, functional balance retraining, patient/caregiver education/training, occupation/activity based interventions, strengthening exercise, transfer/mobility retraining  Therapy Frequency (OT): daily  Plan of Care Review  Plan of Care Reviewed With: patient  Progress: no change (OT evaluation)  Outcome Evaluation: OT evaluation completed. Pt presents with generalized weakness, impaired cognition, decreased balance and activity tolerance limiting independence with ADLs and functional mobility. Pt performed bed mobility with Mod A and STS with Max A x 2. Dep for parris care. Pt would benefit from IP OT and SNF at [place].     Time Calculation:    Time Calculation- OT     Row Name 03/09/22 1308             Time Calculation- OT    OT Start Time 0910  -AN      OT Received On 03/09/22  -AN      OT Goal Re-Cert Due Date 03/19/22  -AN              Untimed Charges    OT Eval/Re-eval Minutes 46  -AN              Total Minutes    Untimed Charges Total Minutes 46  -AN       Total Minutes 46  -AN            User Key  (r) = Recorded By, (t) = Taken By, (c) = Cosigned By    Initials Name Provider Type    Tresa Santoyo OT Occupational Therapist              Therapy Charges for Today     Code Description Service Date Service Provider Modifiers Qty    [id number] HC OT EVAL MOD COMPLEXITY 4 3/9/2022 Tresa Shipley OT GO 1               Tresa Shipley OT  3/9/2022

## 2022-03-09 NOTE — NURSING NOTE
Patient doing well.VSS. RA. Patient cleared for a diet today. No Nausea or vomiting. Prefers meds in yogurt or applesauce. KHARI today. Sat up in recliner today. Unable to walk due to weakness.   See flowsheets for specific details.

## 2022-03-09 NOTE — CASE MANAGEMENT/SOCIAL WORK
Continued Stay Note  [facility]     Patient Name: Karen Larkin  MRN: [id number]  Today's Date: 3/9/2022    Admit Date: 3/5/2022     Discharge Plan     Row Name 03/09/22 1154       Plan    Plan Home    Patient/Family in Agreement with Plan yes    Plan Comments I spoke with Mrs. Larkin in room regarding rehab placement at a skilled Nursing Facility.  She spoke with her family and now would like a referrral to [facility] [place] and one to [facility].  Referral faxed to [facility] and called to [facility] at [facility].  CM following.    Final Discharge Disposition Code 03 - skilled nursing facility (SNF)               Discharge Codes    No documentation.               Expected Discharge Date and Time     Expected Discharge Date Expected Discharge Time    Mar 14, 2022             Payton Ackerman RN

## 2022-03-09 NOTE — DISCHARGE PLACEMENT REQUEST
"Case Management  - [facility] [phone]    Karen Larkin (66 y.o. Female)             Date of Birth   1955    Social Security Number       Address   [address] [place] [address]    Home Phone   [phone]    MRN   [id number]       Muslim   Pentecostal    Marital Status                               Admission Date   3/5/22    Admission Type   Emergency    Admitting Provider   Case, Karen DO FABBY    Attending Provider   Vikram Benitez MD    Department, Room/Bed   [facility]       Discharge Date       Discharge Disposition       Discharge Destination                               Attending Provider: Vikram Benitez MD    Allergies: No Known Allergies    Isolation: None   Infection: None   Code Status: CPR   Advance Care Planning Activity    Ht: 142.2 cm (56\")   Wt: 46.2 kg (101 lb 13.6 oz)    Admission Cmt: None   Principal Problem: Severe sepsis (HCC) [A41.9,R65.20]                 Active Insurance as of 3/5/2022     Primary Coverage     Payor Plan Insurance Group Employer/Plan Group    [employer] CROSS BLUE SHIELD O [id number]     Payor Plan Address Payor Plan Phone Number Payor Plan Fax Number Effective Dates    [id number] [phone]  1/1/2015 - None Entered    [facility] [id number]       Subscriber Name Subscriber Birth Date Member ID       KAREN LARKIN 1955 [id number]                 Emergency Contacts      (Rel.) Home Phone Work Phone Mobile Phone    RICHIE LARKIN (Spouse) [phone] -- [phone]    MAO LARKIN (Daughter) [phone] -- [phone]               Physician Progress Notes (last 24 hours)      Yolette Escalante APRN at 03/09/22 0835          INFECTIOUS DISEASE Progress Note    Karen Larkin  1955  [id number]    Admission Date: 3/5/2022      Requesting Provider: No ref. provider found  Evaluating Physician: MARCIA Connors    Reason for Consultation: " Bacteremia     History of present illness:    3/7/22: Patient is a 66 y.o. female, with PMH RA on Prednisone, large HH, autoimmune heapatitis, GERD, VHD, afib  diastolic heart failure, PCKD, seen today for Enterococcal bacteremia.  She underwent recent bladder prolapse surgery at [facility], transferred to [facility] for rehab.  Readmitted here 2/24- 3/4 with upper GI bleed, undergoing EGD which revealed complicated reflux disease, reflux esophagitis, type IV paraesophageal hernia with 3/4 of her stomach in the intrathoracic cavity. Developed nausea, vomiting, hypotension, and was transferred back to [facility] and returned 3/5 with shortness of breath, hypotension.  CTA of chest with patchy consolidative airspace disease of both lungs, with complete consolidation lower lobes.  Tmax of 99.2. Admitting labs with PCT 1.8, lactate 2.1, WBC 1.8, Sc r 0.97, Alk thiy604, UA with 6-12 WBC, UC no growth.  Blood cultures now positive for E. Faecalis, not VRE, and we were consulted for evaluation and treatment. Currently on Vancomycin and Zosyn.    Review of her records at [facility] revealed an echocardiogram demonstrating aortic and mitral valvular calcifications and significant mitral valve disease with MR.    3/8/22: She has remained afebrile.  Her O2 saturation is 91% on room air. 2 sets of blood cultures from 3/5 are growing Enterococcus faecalis sensitive to ampicillin. Her creatinine this morning is 0.32. Her white blood cell count is 2.76. Strongyloidiasis antibody is pending from 3/7. I ordered a KHARI earlier today.  This was performed and revealed no overt evidence of vegetation but this was a technically difficult study due to her hiatal hernia. Overall, she feels much better today.    3/9/22:  She remains afebrile. She continues to have some chest discomfort. Small amount of bloody sputum with cough.  No nausea.  Talking to her  on the phone.       Past Medical History:   Diagnosis Date   • Anemia     • Arthritis     Rheumatoid   • GERD (gastroesophageal reflux disease)    • Hiatal hernia    • Liver disease    • Murmur    • Rheumatoid arthritis (HCC)    • Varicose veins of both lower extremities        Past Surgical History:   Procedure Laterality Date   • ENDOSCOPY N/A 2/24/2022    Procedure: ESOPHAGOGASTRODUODENOSCOPY;  Surgeon: Brunner, Mark I, MD;  Location: [facility] ENDOSCOPY;  Service: Gastroenterology;  Laterality: N/A;   • HERNIA REPAIR     • HIP SURGERY Right    • SHOULDER SURGERY Left        Family History   Problem Relation Age of Onset   • Heart disease Mother    • Cancer Mother         Colon   • Stroke Mother    • Cancer Father         Esophageal   • Stroke Father    • Cancer Sister         Colon       Social History     Socioeconomic History   • Marital status:    Tobacco Use   • Smoking status: Never Smoker   • Smokeless tobacco: Never Used   Substance and Sexual Activity   • Alcohol use: Never   • Drug use: Never   • Sexual activity: Defer       No Known Allergies      Medication:    Current Facility-Administered Medications:   •  ampicillin-sulbactam 3 g/100 mL 0.9% NS (MBP), 3 g, Intravenous, Q6H, Clovis Art MD, 3 g at 03/09/22 0543  •  bisacodyl (DULCOLAX) EC tablet 5 mg, 5 mg, Oral, Daily PRN, Karen Sharp, DO  •  cefTRIAXone (ROCEPHIN) 2 g/100 mL 0.9% NS IVPB (MBP), 2 g, Intravenous, Q12H, Clovis Art MD, 2 g at 03/09/22 0623  •  enoxaparin (LOVENOX) syringe 30 mg, 30 mg, Subcutaneous, Q24H, Rod Cheema IV, PharmD, 30 mg at 03/09/22 0543  •  HYDROcodone-acetaminophen (NORCO) 5-325 MG per tablet 1 tablet, 1 tablet, Oral, Q6H PRN, Vikram Benitez MD, 1 tablet at 03/09/22 0813  •  melatonin tablet 5 mg, 5 mg, Oral, Nightly PRN, Gordon Lieberman APRN, 5 mg at 03/08/22 2130  •  metoclopramide (REGLAN) injection 5 mg, 5 mg, Intravenous, Q6H PRN, Zenon Ramirez MD, 5 mg at 03/07/22 0820  •  ondansetron (ZOFRAN) injection 4 mg, 4 mg, Intravenous,  Q6H PRN, Vikram Benitez MD, 4 mg at 03/08/22 1001  •  pantoprazole (PROTONIX) EC tablet 40 mg, 40 mg, Oral, Q AM, Vikram Benitez MD, 40 mg at 03/09/22 0544  •  polyethylene glycol (MIRALAX) packet 17 g, 17 g, Oral, Daily PRN, Case, Karen V., DO, 17 g at 03/07/22 0820  •  potassium chloride 20 mEq in 50 mL IVPB, 20 mEq, Intravenous, Q1H PRN, Nereida Estrada APRN, Last Rate: 50 mL/hr at 03/09/22 0544, 20 mEq at 03/09/22 0544  •  potassium phosphate 45 mmol in sodium chloride 0.9 % 250 mL infusion, 45 mmol, Intravenous, PRN **OR** potassium phosphate 30 mmol in sodium chloride 0.9 % 100 mL infusion, 30 mmol, Intravenous, PRN, Last Rate: 25 mL/hr at 03/07/22 0820, 30 mmol at 03/07/22 0820 **OR** potassium phosphate 15 mmol in sodium chloride 0.9 % 100 mL infusion, 15 mmol, Intravenous, PRN, Last Rate: 50 mL/hr at 03/08/22 0438, 15 mmol at 03/08/22 0438 **OR** sodium phosphates 45 mmol in sodium chloride 0.9 % 250 mL IVPB, 45 mmol, Intravenous, PRN **OR** sodium phosphates 30 mmol in sodium chloride 0.9 % 100 mL IVPB, 30 mmol, Intravenous, PRN **OR** sodium phosphates 15 mmol in sodium chloride 0.9 % 100 mL IVPB, 15 mmol, Intravenous, PRN, Nereida Estrada, APRN  •  predniSONE (DELTASONE) tablet 20 mg, 20 mg, Oral, Daily With Breakfast, Vikram Benitez MD, 20 mg at 03/09/22 0813  •  sennosides-docusate (PERICOLACE) 8.6-50 MG per tablet 1 tablet, 1 tablet, Oral, BID, Vikram Benitez MD  •  sodium chloride 0.9 % flush 10 mL, 10 mL, Intravenous, Q12H, Nereida Estrada APRN, 10 mL at 03/08/22 2130  •  sodium chloride 0.9 % flush 10 mL, 10 mL, Intravenous, PRN, Nereida Estrada, APRN  •  sodium chloride 0.9 % flush 10 mL, 10 mL, Intravenous, Q12H, Zenon Ramirez MD, 10 mL at 03/08/22 2129  •  sodium chloride 0.9 % flush 10 mL, 10 mL, Intravenous, PRN, Zenon Ramirez MD  •  sodium chloride 0.9 % flush 20 mL, 20 mL, Intravenous, PRN, Zenon Ramirez MD  •  sucralfate (CARAFATE) tablet 1  g, 1 g, Oral, 4x Daily AC & at Bedtime, Case, Karen V., DO, 1 g at 22 0814    Antibiotics:  Anti-Infectives (From admission, onward)    Ordered     Dose/Rate Route Frequency Start Stop    22 1422  ampicillin-sulbactam 3 g/100 mL 0.9% NS (MBP)        Ordering Provider: Clovis Art MD    3 g Intravenous Every 6 Hours 22 1800 22 1759    22 1422  cefTRIAXone (ROCEPHIN) 2 g/100 mL 0.9% NS IVPB (MBP)        Ordering Provider: Clovis Art MD    2 g  over 30 Minutes Intravenous Every 12 Hours 22 1800 22 1759    22 1526  vancomycin in dextrose 5% 150 mL (VANCOCIN) IVPB 750 mg        Ordering Provider: Carmela Lizarraga PharmD    750 mg  over 60 Minutes Intravenous Once 22 1615 22 1701    22  piperacillin-tazobactam (ZOSYN) 3.375 g in iso-osmotic dextrose 50 ml (premix)        Ordering Provider: Grant Crouch PA    3.375 g  over 30 Minutes Intravenous Once 22 2151    22  vancomycin in dextrose 5% 150 mL (VANCOCIN) IVPB 750 mg        Ordering Provider: Grant Crouch PA    20 mg/kg × 42.6 kg Intravenous Once 22 2346            Review of Systems:  See HPI      Physical Exam:   Vital Signs  Temp (24hrs), Av.3 °F (36.8 °C), Min:97.7 °F (36.5 °C), Max:98.6 °F (37 °C)    Temp  Min: 97.7 °F (36.5 °C)  Max: 98.6 °F (37 °C)  BP  Min: 104/77  Max: 146/108  Pulse  Min: 70  Max: 115  Resp  Min: 14  Max: 20  SpO2  Min: 91 %  Max: 100 %    GENERAL: Awake and alert, in no acute distress.  She is frail and ill-appearing  HEENT: Normocephalic, atraumatic.  PERRL. EOMI. No conjunctival injection. No icterus. Oropharynx clear without evidence of thrush or exudate.   NECK: Supple without nuchal rigidity. No mass.  LYMPH: No cervical, axillary or inguinal lymphadenopathy.  HEART: RRR; 3/6 murmur, rubs, gallops.   LUNGS: diminished   ABDOMEN: Soft, nontender, nondistended. Positive bowel sounds. No  rebound or guarding.   EXT: She has multiple cyanotic digits on her hands and to a lesser extent on her feet.  RA deformities of joints   :  purewick   MSK:  No joint effusions   SKIN: Warm and dry numerous bruises upper extremities.  Left pretibial wound with scabbing.   NEURO: Oriented to PPT.motor strength 5/5  PSYCHIATRIC: Normal insight and judgement. Cooperative with PE  Right PICC without redness     Laboratory Data    Results from last 7 days   Lab Units 03/09/22  0335 03/08/22  0323 03/07/22  0432   WBC 10*3/mm3 3.04* 2.76* 3.20*   HEMOGLOBIN g/dL 9.5* 9.6* 10.3*   HEMATOCRIT % 29.3* 30.6* 31.5*   PLATELETS 10*3/mm3 146 150 185     Results from last 7 days   Lab Units 03/09/22  0335   SODIUM mmol/L 143   POTASSIUM mmol/L 3.5   CHLORIDE mmol/L 113*   CO2 mmol/L 23.0   BUN mg/dL 11   CREATININE mg/dL 0.27*   GLUCOSE mg/dL 83   CALCIUM mg/dL 8.4*     Results from last 7 days   Lab Units 03/08/22  0323   ALK PHOS U/L 229*   BILIRUBIN mg/dL 0.6   ALT (SGPT) U/L 10   AST (SGOT) U/L 19             Results from last 7 days   Lab Units 03/06/22  1257   LACTATE mmol/L 1.7         Results from last 7 days   Lab Units 03/07/22  1320   VANCOMYCIN RM mcg/mL 11.10     Estimated Creatinine Clearance: 153.5 mL/min (A) (by C-G formula based on SCr of 0.27 mg/dL (L)).      Microbiology:  Blood Culture   Date Value Ref Range Status   03/05/2022 Gram Positive Cocci (C)  Preliminary     BCID, PCR   Date Value Ref Range Status   03/05/2022 (A) Negative by BCID PCR. Culture to Follow. Final    Enterococcus faecalis. Ashley/B (vancomycin resistance gene) not detected. Identification by BCID2 PCR.     No results found for: CULTURES, HSVCX, URCX  No results found for: EYECULTURE, GCCX, HSVCULTURE, LABHSV  No results found for: LEGIONELLA, MRSACX, MUMPSCX, MYCOPLASCX  No results found for: NOCARDIACX, STOOLCX  Urine Culture   Date Value Ref Range Status   03/05/2022 No growth  Preliminary     No results found for: VIRALCULTU,  WOUNDCX        Radiology:  Imaging Results (Last 72 Hours)     Procedure Component Value Units Date/Time    XR Chest 1 View [[id number]] Collected: 03/08/22 0715     Updated: 03/08/22 0720    Narrative:         DATE OF EXAM:   3/8/2022 4:18 AM     PROCEDURE:   XR CHEST 1 VW-     INDICATIONS:   Follow-up pneumonia; J18.9-Pneumonia, unspecified organism;  I48.91-Unspecified atrial fibrillation; A41.9-Sepsis, unspecified  organism; I95.9-Hypotension, unspecified; R09.02-Hypoxemia;  R13.10-Dysphagia, unspecified     COMPARISON:  03/07/2022     TECHNIQUE:   Portable chest radiograph.     FINDINGS:    Esophagogastric tube stable in appearance within the patient's large  hiatal hernia/intrathoracic stomach. There is a right upper extremity  PICC line with the tip at the cavoatrial junction. Heart size is normal.  Multifocal bilateral airspace disease most concerning for multifocal  pneumonia, stable. Small left pleural effusion. No pneumothorax.       Impression:         1. No significant change in multifocal airspace disease throughout the  lungs concerning for pneumonia.  2. Esophagogastric tube stable within the patient's large hiatal  hernia/intrathoracic stomach.  3. Stable right upper extremity PICC line. No pneumothorax.     This report was finalized on 3/8/2022 7:17 AM by Alex Roe MD.       CT Abdomen Pelvis With & Without Contrast [[id number]] Collected: 03/08/22 0012     Updated: 03/08/22 0035    Narrative:      DATE OF EXAM: 3/7/2022 6:59 PM     PROCEDURE: CT ABDOMEN PELVIS W WO CONTRAST-     INDICATIONS: Sepsis; J18.9-Pneumonia, unspecified organism;  I48.91-Unspecified atrial fibrillation; A41.9-Sepsis, unspecified  organism; I95.9-Hypotension, unspecified; R09.02-Hypoxemia;  R13.10-Dysphagia, unspecified     COMPARISON: CT abdomen and pelvis 3/3/2022     TECHNIQUE: Routine transaxial slices were obtained through the abdomen  without the administration of intravenous contrast. Additional scanning  through  the abdomen and pelvis followed by the intravenous  administration of 100 mL of Isovue 300. Positive oral contrast was  administered. Reconstructed coronal and sagittal images were also  obtained. Automated exposure control and iterative construction methods  were used.     The radiation dose reduction device was turned on for each scan per the  ALARA (As Low as Reasonably Achievable) protocol.     FINDINGS:     Worsened bibasilar infiltrates concerning for pneumonia/aspiration.  Partially imaged large hiatal hernia. Redemonstration of extensive  cystic change of the liver and the lesser extent the kidneys. Some of  the liver cyst demonstrate peripheral calcifications. Unchanged splenic  cyst. Unremarkable appearance of the pancreas. The adrenal glands appear  unremarkable. No hydronephrosis or nephrolithiasis. The urinary bladder  is decompressed and not well evaluated. Redemonstration of numerous  prominent mildly dilated loops of small bowel. Many of the loops in the  lower abdomen and pelvis demonstrate marked wall thickening/submucosal  edema. A discrete transition point is not confidently identified.  Positive oral contrast is seen within the colon. A small amount of  ascites. No pneumoperitoneum. There is mesenteric hyperemia. Ectatic  abdominal aorta without aneurysm. Otherwise unremarkable appearance of  the vasculature. The bones are severely demineralized. Severe  degenerative change of the left hip. No acute osseous findings. Right  hip cephalomedullary construct.       Impression:         Worsened bibasilar infiltrates concerning for pneumonia/aspiration.     Numerous dilated loops of small bowel, many of which demonstrate  prominent wall thickening/submucosal edema and mesenteric hyperemia.  Infectious or inflammatory enteritis cannot be excluded. No definite  small bowel obstruction with transition point. Positive oral contrast  reaches the colon.     Small ascites may be related to the above.      Sequelae of polycystic kidney and liver disease.     Redemonstration of large hiatal hernia/intrathoracic stomach.     This report was finalized on 3/8/2022 12:32 AM by Tejas Shaver MD.       XR Chest 1 View [[id number]] Collected: 03/07/22 0703     Updated: 03/07/22 0708    Narrative:         DATE OF EXAM:   3/7/2022 4:10 AM     PROCEDURE:   XR CHEST 1 VW-     INDICATIONS:   f/u respiratory illness.; J18.9-Pneumonia, unspecified organism;  I48.91-Unspecified atrial fibrillation; A41.9-Sepsis, unspecified  organism; I95.9-Hypotension, unspecified; R09.02-Hypoxemia     COMPARISON:  03/06/2022      TECHNIQUE:   Portable chest radiograph.     FINDINGS:   Placement of an esophagogastric tube with the tip within the patient's  large hiatal hernia/intrathoracic stomach. There is a right upper  extremity PICC line with the tip at the low SVC. Multifocal patchy  airspace disease throughout the lungs unchanged likely relating to  pneumonia. No pneumothorax. No large effusion. Plate and screw fixation  noted at the left proximal ureters.       Impression:      1. Placement of esophagogastric tube within the patient's large hiatal  hernia/intrathoracic stomach.  2. Stable right upper extremity PICC line.  3. Patchy multifocal airspace disease throughout the lungs concerning  for pneumonia, stable.  4. No pneumothorax.     This report was finalized on 3/7/2022 7:05 AM by Alex Roe MD.       XR Abdomen KUB [[id number]] Collected: 03/06/22 1339     Updated: 03/06/22 1410    Narrative:      DATE OF EXAM: 3/6/2022 1:14 PM     PROCEDURE: XR ABDOMEN KUB-     INDICATIONS: vomiting, tube placement; J18.9-Pneumonia, unspecified  organism; I48.91-Unspecified atrial fibrillation; A41.9-Sepsis,  unspecified organism; I95.9-Hypotension, unspecified; R09.02-Hypoxemia     COMPARISON: 3/2/2020     TECHNIQUE: Single radiographic view of the abdomen was obtained.     FINDINGS:  There is persistent dilatation of bowel loops in the left  abdomen and  upper quadrant. No pneumatosis is identified. No gross evidence of free  air is seen. There is excreted contrast material within the right renal  collecting system and urinary bladder. Atherosclerotic vascular  calcification is noted diffusely. There is degenerative change in the  spine. Severe degenerative change in the left hip. Right hip lag screw  and IM orin incidentally noted..        Impression:         1. Persistent nonspecific dilatation of bowel loops in the left upper  quadrant and left abdomen.  2. There is excreted contrast material remaining in the right renal  collecting system and bladder. This may indicate renal dysfunction given  the length of time since patient's contrasted study 8:00 PM last night.  Correlate clinically.     This report was finalized on 3/6/2022 1:44 PM by Brennen Garcia MD.       XR Chest 1 View [[id number]] Collected: 03/06/22 1119     Updated: 03/06/22 1123    Narrative:         DATE OF EXAM:   3/6/2022 11:16 AM     PROCEDURE:   XR CHEST 1 VW-     INDICATIONS:   picc placement; J18.9-Pneumonia, unspecified organism;  I48.91-Unspecified atrial fibrillation; A41.9-Sepsis, unspecified  organism; I95.9-Hypotension, unspecified; R09.02-Hypoxemia     COMPARISON:  03/05/2022 at 7:19 PM     TECHNIQUE:   [Portable chest radiograph]     FINDINGS:   Multifocal infiltrates are again seen throughout the lungs bilaterally.  These changes are stable. A large hiatal hernia is again noted. The  cardiac silhouette and mediastinum are stable. A right PICC line is  noted. The distal tip partially extends into the right atrium. Recommend  retraction of the line by approximately 2 cm.        Impression:      1.  The right PICC line distal tip partially extends into the right  atrium. Recommend retraction of the line by approximately 2 cm.  2.  Stable multifocal airspace infiltrates bilaterally.     This report was finalized on 3/6/2022 11:20 AM by Kemar Armijo MD.         I read her  radiographic studies      Echocardiogram 3/7/22:  Interpretation Summary    · Left ventricular wall thickness is consistent with mild to moderate basal asymmetric hypertrophy.  · Estimated left ventricular EF = 75% Left ventricular ejection fraction appears to be greater than 70%. Left ventricular systolic function is hyperdynamic (EF > 70%).  · Moderate mitral valve regurgitation is present with an eccentric jet noted.  · Mild aortic valve stenosis is present.  · The right atrial cavity is mild to moderately dilated.  · Estimated right ventricular systolic pressure from tricuspid regurgitation is mildly elevated (35-45 mmHg). Calculated right ventricular systolic pressure from tricuspid regurgitation is 40 mmHg.  · Left atrial volume is severely increased.  · Systolic anterior motion of the anterior mitral leaflet is present and a nonmeasured gradient is present at rest.  · The findings are consistent with obstructive, hypertrophic cardiomyopathy.  · Mild pulmonary hypertension is present.  · There is a trivial circumferential pericardial effusion.     3/8/22:  KHARI    Interpretation Summary    · Estimated left ventricular EF = 56% Left ventricular ejection fraction appears to be 56 - 60%. Left ventricular systolic function is normal.  · There is (grade 1) layered plaque in the descending aorta present.  · Left ventricular wall thickness is consistent with moderate to severe basal asymmetric hypertrophy.  · Estimated right ventricular systolic pressure from tricuspid regurgitation is normal (<35 mmHg).  · The aortic valve exhibits mild sclerosis.  · No evidence of pulmonary hypertension is present.  · There is no evidence of pericardial effusion.  · Technically difficult and limited study due to presumed large hiatal hernia with suboptimal windows for image acquisition without definitive findings suggestive of valvular endocarditis.           Impression:   1.  Enterococcal bacteremia-I am concerned about potential  enterococcal endocarditis since she has underlying significant valvular heart disease.  This is not due to pneumonia as Enterococcus does not cause pneumonia.  UTI and intra-abdominal infection would be other potential sources.  She does not appear to have biliary disease as a source.  She clearly has aspiration pneumonia but as noted above this does not explain the enterococcal bacteremia.  I suspect she has both aspiration pneumonia and possible underlying endocarditis.  I will readjust her antibiotic regimen to intravenous Unasyn and ceftriaxone pending further evaluation.  Strongyloides hyperinfection syndrome would be another potential source for enterococcal bacteremia although she was only on 5 mg of prednisone which is not generally enough immunosuppression to result in strongyloidiasis hyper infection syndrome.  We will check a strongyloides antibody.  If a duodenal feeding tube is placed, I would like to have a duodenal aspirate sent for ova and parasite exam.  Her TTE from 3/7 and KHARI from 3/8  reveal valvular heart disease but no definite endocarditis.   2.  Aspiration pneumonia   3.  Recent bladder prolapse surgery- plates and screws noted on Xray left  proximal uteters  4.  Large Hiatal hernia- with 3/4 of stomach in thoracic cavity   5.  Valvular heart disease-she has significant valvular heart disease with a loud murmur.  We will need to evaluate for endocarditis as noted above.  6.  Rheumatoid arthritis-on chronic prednisone therapy   7.  Polycystic kidney and liver disease  8.  Atrial fibrillation  9.  Leukopenia   10.  Immunocompromised host   11.  Pyuria/? UTI  12.  Acute hypoxic respiratory failure-improving    PLAN/RECOMMENDATIONS:  1.  Repeat blood culture  2.  Continue ceftriaxone 2 g IV every 12 hours  3.  Continue intravenous Unasyn  4.  Strongyloides serology   5.  KHARI  6.  Obtain a duodenal aspirate for ova and parasites if a duodenal feeding tube is placed    Dr. Art has obtained  the history, performed the physical exam and formulated the above treatment plan.     MARCIA Connors  3/9/2022  08:35 EST                  Electronically signed by Yolette Escalante APRN at 22 0847       Consult Notes (last 24 hours)  Notes from 22 1153 through 22 1153   No notes of this type exist for this encounter.            Physical Therapy Notes (last 48 hours)      Corry Munoz PT at 22 0910  Version 1 of 1       Goal Outcome Evaluation:  Plan of Care Reviewed With: patient        Progress: improving  Outcome Evaluation: PT eval is completed patient presents with sepsis and PNA. she demonstrates impaired bed mobility transfers and gait as well as decreased strength and balance both sitting and standing. patient was able to pivot to the chair with max assist. anticipate patient to need SNF placement at D/C    Electronically signed by Corry Munoz PT at 22 1336     Corry Munoz PT at 2210  Version 1 of 1         Patient Name: Karen Larkin  : 1955    MRN: [id number]                              Today's Date: 3/8/2022       Admit Date: 3/5/2022    Visit Dx:     ICD-10-CM ICD-9-CM   1. Multifocal pneumonia  J18.9 486   2. Atrial fibrillation with RVR (HCC)  I48.91 427.31   3. Sepsis, due to unspecified organism, unspecified whether acute organ dysfunction present ([facility])  A41.9 038.9     995.91   4. Hypotension, unspecified hypotension type  I95.9 458.9   5. Hypoxemia  R09.02 799.02   6. Dysphagia, unspecified type  R13.10 787.20     Patient Active Problem List   Diagnosis   • Severe sepsis (HCC)   • Hiatal hernia with gastroesophageal reflux   • Rheumatoid arthritis   • Immunosuppression due to chronic steroid use   • Atrial fibrillation (HCC)   • Polycystic liver disease   • Chronic pain on Narcotics   • Pelvic prolapse   • Severe malnutrition (CMS/HCC)   • Coffee ground emesis   • Essential hypertension   • PAF (paroxysmal atrial  fibrillation) (HCC)   • Chest pain, atypical   • Infarction of parietal lobe (HCC)   • Elevated brain natriuretic peptide (BNP) level   • Elevated BUN   • Dehydration   • Elevated alkaline phosphatase level   • Serum total bilirubin elevated   • Leukopenia   • Aspiration pneumonia (HCC)   • Acute respiratory failure with hypoxia (HCC)     Past Medical History:   Diagnosis Date   • Anemia    • Arthritis     Rheumatoid   • GERD (gastroesophageal reflux disease)    • Hiatal hernia    • Liver disease    • Murmur    • Rheumatoid arthritis (HCC)    • Varicose veins of both lower extremities      Past Surgical History:   Procedure Laterality Date   • ENDOSCOPY N/A 2/24/2022    Procedure: ESOPHAGOGASTRODUODENOSCOPY;  Surgeon: Brunner, Mark I, MD;  Location: [facility] ENDOSCOPY;  Service: Gastroenterology;  Laterality: N/A;   • HERNIA REPAIR     • HIP SURGERY Right    • SHOULDER SURGERY Left       General Information     Row Name 03/08/22 1320          Physical Therapy Time and Intention    Document Type evaluation  -KENNETH     Mode of Treatment physical therapy  -KENNETH     Row Name 03/08/22 1320          General Information    Patient Profile Reviewed yes  -KENNETH     Prior Level of Function independent:;bed mobility;ADL's;gait;transfer  -KENNETH     Existing Precautions/Restrictions fall;cardiac  -KENNETH     Barriers to Rehab medically complex;previous functional deficit  patient has been in and out of the hosp and rehab for the last 2 months with dependent mobility  -KENNETH     Row Name 03/08/22 1320          Living Environment    People in Home spouse  -KENNETH     Row Name 03/08/22 1320          Cognition    Orientation Status (Cognition) oriented x 4  -KENNETH     Row Name 03/08/22 1320          Safety Issues, Functional Mobility    Safety Issues Affecting Function (Mobility) safety precautions follow-through/compliance;safety precaution awareness;judgment  -KENNETH     Impairments Affecting Function (Mobility) balance;endurance/activity tolerance;shortness of  breath;strength;postural/trunk control  -KENNETH           User Key  (r) = Recorded By, (t) = Taken By, (c) = Cosigned By    Initials Name Provider Type    Corry Peralta PT Physical Therapist               Mobility     Row Name 03/08/22 1322          Bed Mobility    Bed Mobility rolling left;rolling right;scooting/bridging;supine-sit  -KENNETH     Rolling Left [place] (Bed Mobility) minimum assist (75% patient effort)  -KENNETH     Rolling Right [place] (Bed Mobility) minimum assist (75% patient effort)  -KENNETH     Scooting/Bridging [place] (Bed Mobility) moderate assist (50% patient effort)  -KENNETH     Supine-Sit [place] (Bed Mobility) moderate assist (50% patient effort);2 person assist  -KENNETH     Assistive Device (Bed Mobility) draw sheet;head of bed elevated  -KENNETH     Comment, (Bed Mobility) patient requires cues for sequencing she needs mod assist with trunk  -KENNETH     Row Name 03/08/22 1322          Transfers    Comment, (Transfers) cues for upright posture and to keep base of support wide patient tends to get feet crossed she has difficulty maintaining erect posture she tends to lean to the right  -KENNETH     Row Name 03/08/22 1322          Bed-Chair Transfer    Bed-Chair [place] (Transfers) 2 person assist;maximum assist (25% patient effort)  -KENNETH     Assistive Device (Bed-Chair Transfers) walker, front-wheeled  -KENNETH     Row Name 03/08/22 1322          Sit-Stand Transfer    Sit-Stand [place] (Transfers) moderate assist (50% patient effort);2 person assist  -KENNETH     Assistive Device (Sit-Stand Transfers) walker, front-wheeled  -KENNETH     Row Name 03/08/22 1322          Gait/Stairs (Locomotion)    [place] Level (Gait) maximum assist (25% patient effort);2 person assist;1 person to manage equipment  -KENNETH     Assistive Device (Gait) walker, front-wheeled  -KENNETH     Distance in Feet (Gait) 3  -KENNETH     Deviations/Abnormal Patterns (Gait) weight shifting decreased;stride length decreased;scissoring  -KENNETH      Comment, (Gait/Stairs) patient leaning to the right in standing 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 patient unable to get base of support wide enough for good balance and gets her feet crossed when trying to take steps. patient was able to pivot to the chair  -           User Key  (r) = Recorded By, (t) = Taken By, (c) = Cosigned By    Initials Name Provider Type    KENNETH Corry Munoz, PT Physical Therapist               Obj/Interventions     Row Name 03/08/22 1327          Range of Motion Comprehensive    General Range of Motion upper extremity range of motion deficits identified;lower extremity range of motion deficits identified  -     Comment, General Range of Motion decreased right UE movement in all planes, decreased left LE knee flexion  -     Row Name 03/08/22 1327          Strength Comprehensive (MMT)    General Manual Muscle Testing (MMT) Assessment lower extremity strength deficits identified  -     Comment, General Manual Muscle Testing (MMT) Assessment bilat LE strength grossly 3/5  -     Row Name 03/08/22 1327          Motor Skills    Therapeutic Exercise hip;knee  -     Row Name 03/08/22 1327          Hip (Therapeutic Exercise)    Hip (Therapeutic Exercise) AAROM (active assistive range of motion)  -     Hip AAROM (Therapeutic Exercise) bilateral;flexion;extension;10 repetitions;sitting  -[facility] Name 03/08/22 1327          Knee (Therapeutic Exercise)    Knee (Therapeutic Exercise) AAROM (active assistive range of motion)  -     Knee AAROM (Therapeutic Exercise) bilateral;flexion;extension;10 repetitions;sitting  -     Row Name 03/08/22 1327          Balance    Balance Assessment sitting static balance;sitting dynamic balance;standing static balance;standing dynamic balance  -     Static Sitting Balance minimal assist  -KENNETH     Dynamic Sitting Balance moderate assist  -KENNETH     Position, Sitting Balance supported;sitting edge of bed  -     Static  Standing Balance moderate assist;2-person assist  -KENNETH     Dynamic Standing Balance maximum assist;2-person assist  -KENNETH     Position/Device Used, Standing Balance supported;walker, rolling  -KENNETH     Balance Interventions sitting;standing;static;weight shifting activity  -KENNETH           User Key  (r) = Recorded By, (t) = Taken By, (c) = Cosigned By    Initials Name Provider Type    KENNETH Corry Munoz A, PT Physical Therapist               Goals/Plan     Row Name 03/08/22 1334          Bed Mobility Goal 1 (PT)    Activity/Assistive Device (Bed Mobility Goal 1, PT) bed mobility activities, all  -KENNETH     [place] Level/Cues Needed (Bed Mobility Goal 1, PT) independent  -KENNETH     Time Frame (Bed Mobility Goal 1, PT) long term goal (LTG);10 days  -KENNETH     Progress/Outcomes (Bed Mobility Goal 1, PT) goal ongoing  -KENNETH     Row Name 03/08/22 1334          Transfer Goal 1 (PT)    Activity/Assistive Device (Transfer Goal 1, PT) sit-to-stand/stand-to-sit  -KENNETH     [place] Level/Cues Needed (Transfer Goal 1, PT) minimum assist (75% or more patient effort)  -KENNETH     Time Frame (Transfer Goal 1, PT) long term goal (LTG);10 days  -KENNETH     Progress/Outcome (Transfer Goal 1, PT) goal ongoing  -KENNETH     Row Name 03/08/22 1334          Gait Training Goal 1 (PT)    Activity/Assistive Device (Gait Training Goal 1, PT) gait (walking locomotion)  -KENNETH     [place] Level (Gait Training Goal 1, PT) moderate assist (50-74% patient effort)  -KENNETH     Distance (Gait Training Goal 1, PT) 25  -KENNETH     Time Frame (Gait Training Goal 1, PT) long term goal (LTG);10 days  -KENNETH     Progress/Outcome (Gait Training Goal 1, PT) goal ongoing  -KENNETH     Row Name 03/08/22 1334          Patient Education Goal (PT)    Activity (Patient Education Goal, PT) HEP  -KENNETH     [place]/Cues/Accuracy (Memory Goal 2, PT) verbalizes understanding  -KENNETH     Time Frame (Patient Education Goal, PT) long term goal (LTG);10 days  -KENNETH     Progress/Outcome (Patient Education Goal,  PT) goal ongoing  -KENNETH           User Key  (r) = Recorded By, (t) = Taken By, (c) = Cosigned By    Initials Name Provider Type    Corry Peralta, PT Physical Therapist               Clinical Impression     Row Name 03/08/22 1331          Pain    Pretreatment Pain Rating 0/10 - no pain  -KENNETH     Posttreatment Pain Rating 0/10 - no pain  -KENNETH     Row Name 03/08/22 1331          Plan of Care Review    Plan of Care Reviewed With patient  -KENNETH     Progress improving  -KENNETH     Outcome Evaluation PT eval is completed patient presents with sepsis and PNA. she demonstrates impaired bed mobility transfers and gait as well as decreased strength and balance both sitting and standing. patient was able to pivot to the chair with max assist. anticipate patient to need SNF placement at D/C  -     Row Name 03/08/22 9981          Therapy Assessment/Plan (PT)    Patient/Family Therapy Goals Statement (PT) be able to walk  -KENNETH     Rehab Potential (PT) fair, will monitor progress closely  -KENNETH     Criteria for Skilled Interventions Met (PT) yes;skilled treatment is necessary  -KENNETH     Row Name 03/08/22 1331          Vital Signs    Pre Systolic BP Rehab 118  -KENNETH     Pre Treatment Diastolic BP 80  -KENNETH     Post Systolic BP Rehab 129  -KENNETH     Post Treatment Diastolic BP 98  -KENNETH     Pretreatment Heart Rate (beats/min) 96  -KENNETH     Posttreatment Heart Rate (beats/min) 110  -KENNETH     Pre SpO2 (%) 98  -KENNETH     O2 Delivery Pre Treatment room air  -KENNETH     Post SpO2 (%) 93  -KENNETH     O2 Delivery Post Treatment room air  -KENNETH     Pre Patient Position Supine  -KENNETH     Intra Patient Position Standing  -KENNETH     Post Patient Position Sitting  -KENNETH     Row Name 03/08/22 1331          Positioning and Restraints    Pre-Treatment Position in bed  -KENNETH     Post Treatment Position chair  -KENNETH     In Chair notified nsg;reclined;sitting;call light within reach;encouraged to call for assist;exit alarm on;with nsg  -KENNETH           User Key  (r) = Recorded By, (t) = Taken  By, (c) = Cosigned By    Initials Name Provider Type    Corry Peralta PT Physical Therapist               Outcome Measures     Row Name 03/08/22 6772          How much help from another person do you currently need...    Turning from your back to your side while in flat bed without using bedrails? 3  -KENNETH     Moving from lying on back to sitting on the side of a flat bed without bedrails? 2  -KNENETH     Moving to and from a bed to a chair (including a wheelchair)? 2  -KENNETH     Standing up from a chair using your arms (e.g., wheelchair, bedside chair)? 2  -KENNETH     Climbing 3-5 steps with a railing? 1  -KENNETH     To walk in hospital room? 2  -KENNETH     AM-PAC 6 Clicks Score (PT) 12  -KENNETH           User Key  (r) = Recorded By, (t) = Taken By, (c) = Cosigned By    Initials Name Provider Type    Corry Peralta PT Physical Therapist                             Physical Therapy Education                 Title: PT OT SLP Therapies (In Progress)     Topic: Physical Therapy (In Progress)     Point: Mobility training (In Progress)     Learning Progress Summary           Patient Acceptance, E, NR by KENNETH at 3/8/2022 0910                   Point: Home exercise program (In Progress)     Learning Progress Summary           Patient Acceptance, E, NR by KENNETH at 3/8/2022 0910                   Point: Body mechanics (In Progress)     Learning Progress Summary           Patient Acceptance, E, NR by KENNETH at 3/8/2022 0910                   Point: Precautions (In Progress)     Learning Progress Summary           Patient Acceptance, E, NR by KENNETH at 3/8/2022 0910                               User Key     Initials Effective Dates Name Provider Type Discipline    KENNETH 06/16/21 -  Corry Munoz PT Physical Therapist PT              PT Recommendation and Plan  Planned Therapy Interventions (PT): balance training, bed mobility training, gait training, home exercise program, strengthening, transfer training  Plan of Care Reviewed With:  patient  Progress: improving  Outcome Evaluation: PT eval is completed patient presents with sepsis and PNA. she demonstrates impaired bed mobility transfers and gait as well as decreased strength and balance both sitting and standing. patient was able to pivot to the chair with max assist. anticipate patient to need SNF placement at D/C     Time Calculation:    PT Charges     Row Name 22 1337             Time Calculation    Start Time 0910  -KENNETH      PT Received On 22  -KENNETH      PT Goal Re-Cert Due Date 22  -KENNETH              Time Calculation- PT    Total Timed Code Minutes- PT 24 minute(s)  -KENNETH              Timed Charges    74673 - PT Therapeutic Activity Minutes 24  -KENNETH              Untimed Charges    PT Eval/Re-eval Minutes 25  -KENNETH              Total Minutes    Timed Charges Total Minutes 24  -KENNETH      Untimed Charges Total Minutes 25  -KENNETH       Total Minutes 49  -KENNETH            User Key  (r) = Recorded By, (t) = Taken By, (c) = Cosigned By    Initials Name Provider Type    Corry Peralta, PT Physical Therapist              Therapy Charges for Today     Code Description Service Date Service Provider Modifiers Qty    34913114552 HC PT THERAPEUTIC ACT EA 15 MIN 3/8/2022 Corry Munoz, PT GP 2    05563139361 HC PT EVAL MOD COMPLEXITY 2 3/8/2022 Corry Munoz, PT GP 1          PT G-Codes  AM-PAC 6 Clicks Score (PT): 12    Corry Munoz PT  3/8/2022      Electronically signed by Corry Munoz PT at 22 1337       Occupational Therapy Notes (last 48 hours)  Notes from 22 1153 through 22 1153   No notes exist for this encounter.          Speech Language Pathology Notes (last 48 hours)      Sandra Hubbard MS CCC-SLP at 22 1122          Acute Care - Speech Language Pathology   Swallow Re-Evaluation [facility]   Clinical Swallow Evaluation     Patient Name: Karen Larkin  : 1955  MRN: [id number]  Today's Date: 3/8/2022               Admit  Date: 3/5/2022    Visit Dx:     ICD-10-CM ICD-9-CM   1. Multifocal pneumonia  J18.9 486   2. Atrial fibrillation with RVR (HCC)  I48.91 427.31   3. Sepsis, due to unspecified organism, unspecified whether acute organ dysfunction present (HCC)  A41.9 038.9     995.91   4. Hypotension, unspecified hypotension type  I95.9 458.9   5. Hypoxemia  R09.02 799.02   6. Dysphagia, unspecified type  R13.10 787.20     Patient Active Problem List   Diagnosis   • Severe sepsis (HCC)   • Hiatal hernia with gastroesophageal reflux   • Rheumatoid arthritis   • Immunosuppression due to chronic steroid use   • Atrial fibrillation (HCC)   • Polycystic liver disease   • Chronic pain on Narcotics   • Pelvic prolapse   • Severe malnutrition (CMS/HCC)   • Coffee ground emesis   • Essential hypertension   • PAF (paroxysmal atrial fibrillation) (HCC)   • Chest pain, atypical   • Infarction of parietal lobe (HCC)   • Elevated brain natriuretic peptide (BNP) level   • Elevated BUN   • Dehydration   • Elevated alkaline phosphatase level   • Serum total bilirubin elevated   • Leukopenia   • Aspiration pneumonia (HCC)   • Acute respiratory failure with hypoxia (HCC)     Past Medical History:   Diagnosis Date   • Anemia    • Arthritis     Rheumatoid   • GERD (gastroesophageal reflux disease)    • Hiatal hernia    • Liver disease    • Murmur    • Rheumatoid arthritis (HCC)    • Varicose veins of both lower extremities      Past Surgical History:   Procedure Laterality Date   • ENDOSCOPY N/A 2/24/2022    Procedure: ESOPHAGOGASTRODUODENOSCOPY;  Surgeon: Brunner, Mark I, MD;  [facility]: [facility];  Service: Gastroenterology;  Laterality: N/A;   • HERNIA REPAIR     • HIP SURGERY Right    • SHOULDER SURGERY Left        SLP Recommendation and Plan  SLP Swallowing Diagnosis: esophageal dysphagia (03/08/22 1015)  SLP Diet Recommendation: soft textures, chopped, thin liquids (03/08/22 1015)  Recommended Precautions and Strategies: upright posture  during/after eating, general aspiration precautions, reflux precautions (03/08/22 1015)  SLP Rec. for Method of Medication Administration: as tolerated (pt cuts some and/pr places in puree at baseline) (03/08/22 1015)     Monitor for Signs of Aspiration: notify SLP if any concerns (03/08/22 1015)     Swallow Criteria for Skilled Therapeutic Interventions Met: demonstrates skilled criteria (03/08/22 1015)     Rehab Potential/Prognosis, Swallowing: good, to achieve stated therapy goals (03/08/22 1015)  Therapy Frequency (Swallow): 5 days per week (03/08/22 1015)  Predicted Duration Therapy Intervention (Days): until discharge (03/08/22 1015)  Demonstrates Need for Referral to Another Service: clinical nutrition services/dietitian, other (see comments) (to assist with solids r/t GERD - adjust to pt's preference) (03/08/22 1015)                               Plan of Care Reviewed With: patient  Progress: improving      SWALLOW EVALUATION (last 72 hours)     SLP Adult Swallow Evaluation     Row Name 03/08/22 1015                Rehab Evaluation    Document Type re-evaluation  -       Subjective Information no complaints  -       Patient Observations alert;cooperative  -       Patient/Family/Caregiver Comments/Observations --       Patient Effort --       Symptoms Noted During/After Treatment --               General Information    Patient Profile Reviewed --       Pertinent History Of Current Problem --       Current Method of Nutrition NPO;nasogastric feedings  -       Precautions/Limitations, Vision --       Precautions/Limitations, Hearing --       Prior Level of Function-Communication --       Prior Level of Function-Swallowing --       Plans/Goals Discussed with patient;agreed upon  -       Barriers to Rehab none identified  -       Patient's Goals for Discharge return to PO diet  -               Pain    Additional Documentation Pain Scale: Numbers Pre/Post-Treatment (Group)  -               Pain  Scale: Numbers Pre/Post-Treatment    Pretreatment Pain Rating 0/10 - no pain  -SM       Posttreatment Pain Rating 0/10 - no pain  -SM               Pain Scale: FACES Pre/Post-Treatment    Pain: FACES Scale, Pretreatment --       Posttreatment Pain Rating --               Oral Motor Structure and Function    Dentition Assessment --       Secretion Management --       Mucosal Quality --       Volitional Swallow --       Volitional Cough --               Oral Musculature and Cranial Nerve Assessment    Oral Motor General Assessment --               General Eating/Swallowing Observations    Respiratory Support Currently in Use --       Eating/Swallowing Skills --       Positioning During Eating --       Utensils Used --       Consistencies Trialed --       Pre SpO2 (%) --       Post SpO2 (%) --               Respiratory    Respiratory Status --               Clinical Swallow Eval    Oral Prep Phase --       Oral Transit --       Oral Residue --       Pharyngeal Phase no overt signs/symptoms of pharyngeal impairment  -SM       Esophageal Phase --       Clinical Swallow Evaluation Summary Oral difficulties r/t edentulous state. Esophageal difficulties r/t known GERD/hernia. Otherwise significantly improved, showing no s/s aspiration. Suspect PNA could be more related to known vomiting.  -SM               Oral Prep Concerns    Oral Prep Concerns --       Oral Holding --       Prolonged Mastication --               Oral Transit Concerns    Oral Transit Concerns --       Delayed Intiation of Bolus Transit --       Increased Oral Transit Time --               Pharyngeal Phase Concerns    Pharyngeal Phase Concerns --               Esophageal Phase Concerns    Esophageal Phase Concerns --       Sensation of Material Sticking --               SLP Evaluation Clinical Impression    SLP Swallowing Diagnosis esophageal dysphagia  -       Functional Impact risk of aspiration/pneumonia  -SM       Rehab Potential/Prognosis,  Swallowing good, to achieve stated therapy goals  -       Swallow Criteria for Skilled Therapeutic Interventions Met demonstrates skilled criteria  -               Recommendations    Therapy Frequency (Swallow) 5 days per week  -       Predicted Duration Therapy Intervention (Days) until discharge  -       SLP Diet Recommendation soft textures;chopped;thin liquids  -       Recommended Diagnostics --       Recommended Precautions and Strategies upright posture during/after eating;general aspiration precautions;reflux precautions  -       Oral Care Recommendations Oral Care BID/PRN  -       SLP Rec. for Method of Medication Administration as tolerated  pt cuts some and/pr places in puree at baseline  -       Monitor for Signs of Aspiration notify SLP if any concerns  -       Anticipated Discharge Disposition (SLP) --       Demonstrates Need for Referral to Another Service clinical nutrition services/dietitian;other (see comments)  to assist with solids r/t GERD - adjust to pt's preference  -             User Key  (r) = Recorded By, (t) = Taken By, (c) = Cosigned By    Initials Name Effective Dates    Sandra Vernon MS CCC-SLP 06/16/21 -     Florinda Frost MS CCC-SLP 06/16/21 -                 EDUCATION  The patient has been educated in the following areas:   Dysphagia (Swallowing Impairment) Modified Diet Instruction.              Time Calculation:    Time Calculation- SLP     Row Name 03/08/22 1121             Time Calculation- [facility]    SLP Start Time 1015  -      SLP Received On 03/08/22  -              Untimed Charges    36928-VT Eval Oral Pharyng Swallow Minutes 40  -SM              Total Minutes    Untimed Charges Total Minutes 40  -SM       Total Minutes 40  -SM            User Key  (r) = Recorded By, (t) = Taken By, (c) = Cosigned By    Initials Name Provider Type    Sandra Vernon, MS CCC-SLP Speech and Language Pathologist                Therapy Charges for  Today     Code Description Service Date Service Provider Modifiers Qty    54968553792  ST EVAL ORAL PHARYNG SWALLOW 3 3/8/2022 Sandra Hubbard MS CCC-SLP GN 1            Patient was not wearing a face mask and did not exhibit coughing during this therapy encounter.  Procedure performed was aerosolizing, involved close contact (within 6 feet for at least 15 minutes or longer), and did not involve contact with infectious secretions or specimens.  Therapist used appropriate personal protective equipment including gloves, standard procedure mask and eye protection.  Appropriate PPE was worn during the entire therapy session.  Hand hygiene was completed before and after therapy session.       Sandra Hubbard MS CCC-SLP  3/8/2022    Electronically signed by Sandra Hubbard MS CCC-SLP at 22 1123     Sandra Hubbard MS CCC-SLP at 22 1120        Goal Outcome Evaluation:  Plan of Care Reviewed With: patient        Progress: improving     SLP re-evaluation completed. No s/s aspiration. Known esophageal dysphagia r/t GERD/hernia. RD to assist with food preferences so as not to over restrict her diet. Will f/u to ensure no further needs. Please see note for further details and recommendations.      Electronically signed by Sandra Hubbard MS CCC-SLP at 22 1121     Florinda Bob MS CCC-SLP at 22 1633        Goal Outcome Evaluation:  Plan of Care Reviewed With: patient    SLP evaluation completed. Will address dysphagia . Please see note for further details and recommendations.                  Electronically signed by Florinda Bob MS CCC-SLP at 22 1634     Florinda Bob MS CCC-SLP at 22 1632          Acute Care - Speech Language Pathology   Swallow Initial Evaluation [facility]   Clinical Swallow Evaluation       Patient Name: Karen Larkin  : 1955  MRN: [id number]  Today's Date: 3/7/2022               Admit Date: 3/5/2022    Visit Dx:      ICD-10-CM ICD-9-CM   1. Multifocal pneumonia  J18.9 486   2. Atrial fibrillation with RVR (HCC)  I48.91 427.31   3. Sepsis, due to unspecified organism, unspecified whether acute organ dysfunction present (HCC)  A41.9 038.9     995.91   4. Hypotension, unspecified hypotension type  I95.9 458.9   5. Hypoxemia  R09.02 799.02   6. Dysphagia, unspecified type  R13.10 787.20     Patient Active Problem List   Diagnosis   • Severe sepsis (HCC)   • Hiatal hernia with gastroesophageal reflux   • Rheumatoid arthritis   • Immunosuppression due to chronic steroid use   • Atrial fibrillation (HCC)   • Polycystic liver disease   • Chronic pain on Narcotics   • Pelvic prolapse   • Severe malnutrition (CMS/HCC)   • Coffee ground emesis   • Essential hypertension   • PAF (paroxysmal atrial fibrillation) (HCC)   • Chest pain, atypical   • Infarction of parietal lobe (HCC)   • Elevated brain natriuretic peptide (BNP) level   • Elevated BUN   • Dehydration   • Elevated alkaline phosphatase level   • Serum total bilirubin elevated   • Leukopenia   • Aspiration pneumonia (HCC)   • Acute respiratory failure with hypoxia (HCC)     Past Medical History:   Diagnosis Date   • Anemia    • Arthritis     Rheumatoid   • GERD (gastroesophageal reflux disease)    • Hiatal hernia    • Liver disease    • Murmur    • Rheumatoid arthritis (HCC)    • Varicose veins of both lower extremities      Past Surgical History:   Procedure Laterality Date   • ENDOSCOPY N/A 2/24/2022    Procedure: ESOPHAGOGASTRODUODENOSCOPY;  Surgeon: Brunner, Mark I, MD;  Location: [facility] ENDOSCOPY;  Service: Gastroenterology;  Laterality: N/A;   • HERNIA REPAIR     • HIP SURGERY Right    • SHOULDER SURGERY Left        SLP Recommendation and Plan  SLP Swallowing Diagnosis: mild, oral dysphagia, suspected pharyngeal dysphagia, esophageal dysphagia (03/07/22 1400)  SLP Diet Recommendation: NPO, temporary alternate methods of nutrition/hydration, other (see comments) (OK for 3-4  ice chips per hr at MD discretion) (03/07/22 1400)     SLP Rec. for Method of Medication Administration: meds via alternate route (03/07/22 1400)        Recommended Diagnostics: reassess via clinical swallow evaluation (03/07/22 1400)  Swallow Criteria for Skilled Therapeutic Interventions Met: demonstrates skilled criteria (03/07/22 1400)  Anticipated Discharge Disposition (SLP): unknown, anticipate therapy at next level of care (03/07/22 1400)  Rehab Potential/Prognosis, Swallowing: adequate, monitor progress closely (03/07/22 1400)  Therapy Frequency (Swallow): evaluation only (03/07/22 1400)  Predicted Duration Therapy Intervention (Days): until discharge (03/07/22 1400)                                         SWALLOW EVALUATION (last 72 hours)     SLP Adult Swallow Evaluation     Row Name 03/07/22 1400                   Rehab Evaluation    Document Type evaluation  -        Subjective Information no complaints  -        Patient Observations alert;cooperative;agree to therapy  -        Patient/Family/Caregiver Comments/Observations No family present  -        Patient Effort good  -        Symptoms Noted During/After Treatment none  -                  General Information    Patient Profile Reviewed yes  -CH        Pertinent History Of Current Problem Patient admitted with aspiration pneumonia, lrg paraesophageal hernia and erosive gastric reflux with bleeding. CXR: Near complete consolidation of both lower lobes.  -        Current Method of Nutrition NPO;nasogastric feedings  -        Precautions/Limitations, Vision WFL;for purposes of eval  -CH        Precautions/Limitations, Hearing WFL;for purposes of eval  -CH        Prior Level of Function-Communication WFL  -        Prior Level of Function-Swallowing no diet consistency restrictions  -        Plans/Goals Discussed with patient;agreed upon  -        Barriers to Rehab medically complex  -        Patient's Goals for Discharge return to  PO diet  -CH                  Pain    Additional Documentation Pain Scale: FACES Pre/Post-Treatment (Group)  -CH                  Pain Scale: FACES Pre/Post-Treatment    Pain: FACES Scale, Pretreatment 0-->no hurt  -CH        Posttreatment Pain Rating 0-->no hurt  -CH                  Oral Motor Structure and Function    Dentition Assessment natural, present and adequate  -CH        Secretion Management WNL/WFL  -CH        Mucosal Quality dry  -CH        Volitional Swallow delayed;weak  -CH        Volitional Cough weak  -CH                  Oral Musculature and Cranial Nerve Assessment    Oral Motor General Assessment generalized oral motor weakness  -CH                  General Eating/Swallowing Observations    Respiratory Support Currently in Use nasal cannula;other (see comments)  3L  -CH        Eating/Swallowing Skills fed by SLP  -CH        Positioning During Eating upright in bed;upright 90 degree  -        Utensils Used spoon  -CH        Consistencies Trialed ice chips;thin liquids;pureed  -CH        Pre SpO2 (%) 96  -CH        Post SpO2 (%) 94  -CH                  Respiratory    Respiratory Status WFL  -CH                  Clinical Swallow Eval    Oral Prep Phase impaired  -CH        Oral Transit impaired  -CH        Oral Residue WFL  -CH        Pharyngeal Phase suspected pharyngeal impairment  -CH        Esophageal Phase suspected esophageal impairment  -CH        Clinical Swallow Evaluation Summary Patient was given trials of thin via ice chips and thin H2O via teaspoon and pureed. Patient felt as though all trials were stuck in ther throat. Multiple swallows with all constencies and wet vocal quailty w thin via teaspoon. Patient began gagging and vomited immediately following evaluation. recommend continued NPO with alternate means of nutrition. Ok for 3-4 ice chips per hour per MD discretion. SLP to re-evaluate swallow as appropriate.  -CH                  Oral Prep Concerns    Oral Prep Concerns oral  holding;prolonged mastication  -        Oral Holding all consistencies  -        Prolonged Mastication all consistencies  -                  Oral Transit Concerns    Oral Transit Concerns increased oral transit time;delayed initiation of bolus transit  -        Delayed Intiation of Bolus Transit all consistencies  -        Increased Oral Transit Time all consistencies  -                  Pharyngeal Phase Concerns    Pharyngeal Phase Concerns wet vocal quality;multiple swallows  -                  Esophageal Phase Concerns    Esophageal Phase Concerns sensation of material sticking  -        Sensation of Material Sticking all consistencies  -                  SLP Evaluation Clinical Impression    SLP Swallowing Diagnosis mild;oral dysphagia;suspected pharyngeal dysphagia;esophageal dysphagia  -        Functional Impact risk of aspiration/pneumonia;risk of malnutrition;risk of dehydration  -        Rehab Potential/Prognosis, Swallowing adequate, monitor progress closely  -        Swallow Criteria for Skilled Therapeutic Interventions Met demonstrates skilled criteria  -                  Recommendations    Therapy Frequency (Swallow) evaluation only  -        Predicted Duration Therapy Intervention (Days) until discharge  -        SLP Diet Recommendation NPO;temporary alternate methods of nutrition/hydration;other (see comments)  OK for 3-4 ice chips per hr at MD discretion  -        Recommended Diagnostics reassess via clinical swallow evaluation  -        Oral Care Recommendations Oral Care BID/PRN;Suction toothbrush  -        SLP Rec. for Method of Medication Administration meds via alternate route  -        Anticipated Discharge Disposition (SLP) unknown;anticipate therapy at next level of care  -              User Key  (r) = Recorded By, (t) = Taken By, (c) = Cosigned By    Initials Name Effective Dates     Florinda Bob MS CCC-SLP 06/16/21 -                  EDUCATION  The patient has been educated in the following areas:   Dysphagia (Swallowing Impairment) Oral Care/Hydration NPO rationale.              Time Calculation:    Time Calculation- SLP     Row Name 03/07/22 1631             Time Calculation- SLP    SLP Start Time 1400  -CH      SLP Received On 03/07/22  -CH              Untimed Charges    SLP Eval/Re-eval  ST Eval Oral Pharyng Swallow - 86110  -CH      30702-NE Eval Oral Pharyng Swallow Minutes 55  -CH              Total Minutes    Untimed Charges Total Minutes 55  -CH       Total Minutes 55  -CH            User Key  (r) = Recorded By, (t) = Taken By, (c) = Cosigned By    Initials Name Provider Type     Florinda Bob MS CCC-SLP Speech and Language Pathologist                Therapy Charges for Today     Code Description Service Date Service Provider Modifiers Qty    [id number]  ST EVAL ORAL PHARYNG SWALLOW 4 3/7/2022 Florinda Bob MS CCC-ALLIE GN 1               MS SCARLETT Rand  3/7/2022       Patient was not wearing a face mask and did exhibit coughing during this therapy encounter.  Procedure performed was aerosolizing, involved close contact (within 6 feet for at least 15 minutes or longer), and did not involve contact with infectious secretions or specimens.  Therapist used appropriate personal protective equipment including gloves, standard procedure mask and eye protection.  Appropriate PPE was worn during the entire therapy session.  Hand hygiene was completed before and after therapy session.       Electronically signed by Florinda Bob MS CCC-SLP at 03/07/22 2078

## 2022-03-09 NOTE — PROGRESS NOTES
INFECTIOUS DISEASE Progress Note    Karen Larkin  1955  [id number]    Admission Date: 3/5/2022      Requesting Provider: No ref. provider found  Evaluating Physician: Clovis Art MD    Reason for Consultation: Bacteremia     History of present illness:    3/7/22: Patient is a 66 y.o. female, with PMH RA on Prednisone, large HH, autoimmune heapatitis, GERD, VHD, afib  diastolic heart failure, PCKD, seen today for Enterococcal bacteremia.  [place] [facility] [place] [facility] [place]  Readmitted here 2/24- 3/4 with upper GI bleed, undergoing EGD which revealed complicated reflux disease, reflux esophagitis, type IV paraesophageal hernia with 3/4 of her stomach in the intrathoracic cavity. Developed nausea, vomiting, hypotension, and was transferred back to [facility] and returned 3/5 with shortness of breath, hypotension.  CTA of chest with patchy consolidative airspace disease of both lungs, with complete consolidation lower lobes.  Tmax of 99.2. Admitting labs with PCT 1.8, lactate 2.1, WBC 1.8, Sc r 0.97, Alk [id number], UA with 6-12 WBC, UC no growth.  Blood cultures now positive for E. Faecalis, not VRE, and we were consulted for evaluation and treatment. Currently on Vancomycin and Zosyn.    Review of her records at [facility] revealed an echocardiogram demonstrating aortic and mitral valvular calcifications and significant mitral valve disease with MR.    3/8/22: She has remained afebrile.  Her O2 saturation is 91% on room air. 2 sets of blood cultures from 3/5 are growing Enterococcus faecalis sensitive to ampicillin. Her creatinine this morning is 0.32. Her white blood cell count is 2.76. Strongyloidiasis antibody is pending from 3/7. I ordered a KHARI earlier today.  This was performed and revealed no overt evidence of vegetation but this was a technically difficult study due to her hiatal hernia. Overall, she feels much better  today.    3/9/22:  She remains afebrile. She continues to have some chest discomfort. Small amount of bloody sputum with cough.  No nausea.  Talking to her  on the phone.  She denies nausea, vomiting, and diarrhea.  She has decreased dyspnea.         Past Medical History:   Diagnosis Date   • Anemia    • Arthritis     Rheumatoid   • GERD (gastroesophageal reflux disease)    • Hiatal hernia    • Liver disease    • Murmur    • Rheumatoid arthritis (HCC)    • Varicose veins of both lower extremities        Past Surgical History:   Procedure Laterality Date   • ENDOSCOPY N/A 2/24/2022    Procedure: ESOPHAGOGASTRODUODENOSCOPY;  Surgeon: Brunner, Mark I, MD;  Location: [facility] ENDOSCOPY;  Service: Gastroenterology;  Laterality: N/A;   • HERNIA REPAIR     • HIP SURGERY Right    • SHOULDER SURGERY Left        Family History   Problem Relation Age of Onset   • Heart disease Mother    • Cancer Mother         Colon   • Stroke Mother    • Cancer Father         Esophageal   • Stroke Father    • Cancer Sister         Colon       Social History     Socioeconomic History   • Marital status:    Tobacco Use   • Smoking status: Never Smoker   • Smokeless tobacco: Never Used   Substance and Sexual Activity   • Alcohol use: Never   • Drug use: Never   • Sexual activity: Defer       No Known Allergies      Medication:    Current Facility-Administered Medications:   •  ampicillin-sulbactam 3 g/100 mL 0.9% NS (MBP), 3 g, Intravenous, Q6H, Clovis Art MD, 3 g at 03/09/22 1215  •  bisacodyl (DULCOLAX) EC tablet 5 mg, 5 mg, Oral, Daily PRN, Case, Karen V., DO  •  cefTRIAXone (ROCEPHIN) 2 g/100 mL 0.9% NS IVPB (MBP), 2 g, Intravenous, Q12H, Clovis Art MD, 2 g at 03/09/22 0623  •  enoxaparin (LOVENOX) syringe 30 mg, 30 mg, Subcutaneous, Q24H, Rod Cheema IV, PharmD, 30 mg at 03/09/22 0543  •  HYDROcodone-acetaminophen (NORCO) 5-325 MG per tablet 1 tablet, 1 tablet, Oral, Q6H PRN, Vikram Benitez MD,  1 tablet at 03/09/22 0813  •  melatonin tablet 5 mg, 5 mg, Oral, Nightly PRN, Gordon Lieberman, APRN, 5 mg at 03/08/22 2130  •  metoclopramide (REGLAN) injection 5 mg, 5 mg, Intravenous, Q6H PRN, Zenon Ramirez MD, 5 mg at 03/07/22 0820  •  ondansetron (ZOFRAN) injection 4 mg, 4 mg, Intravenous, Q6H PRN, Vikram Benitez MD, 4 mg at 03/08/22 1001  •  pantoprazole (PROTONIX) EC tablet 40 mg, 40 mg, Oral, Q AM, Vikram Benitez MD, 40 mg at 03/09/22 0544  •  polyethylene glycol (MIRALAX) packet 17 g, 17 g, Oral, Daily PRN, Case, Karen V., DO, 17 g at 03/07/22 0820  •  potassium chloride 20 mEq in 50 mL IVPB, 20 mEq, Intravenous, Q1H PRN, Nereida Estrada APRN, Last Rate: 50 mL/hr at 03/09/22 1112, 20 mEq at 03/09/22 1112  •  potassium phosphate 45 mmol in sodium chloride 0.9 % 250 mL infusion, 45 mmol, Intravenous, PRN **OR** potassium phosphate 30 mmol in sodium chloride 0.9 % 100 mL infusion, 30 mmol, Intravenous, PRN, Last Rate: 25 mL/hr at 03/07/22 0820, 30 mmol at 03/07/22 0820 **OR** potassium phosphate 15 mmol in sodium chloride 0.9 % 100 mL infusion, 15 mmol, Intravenous, PRN, Last Rate: 50 mL/hr at 03/08/22 0438, 15 mmol at 03/08/22 0438 **OR** sodium phosphates 45 mmol in sodium chloride 0.9 % 250 mL IVPB, 45 mmol, Intravenous, PRN **OR** sodium phosphates 30 mmol in sodium chloride 0.9 % 100 mL IVPB, 30 mmol, Intravenous, PRN **OR** sodium phosphates 15 mmol in sodium chloride 0.9 % 100 mL IVPB, 15 mmol, Intravenous, PRN, Nereida Estrada APRN  •  predniSONE (DELTASONE) tablet 20 mg, 20 mg, Oral, Daily With Breakfast, Vikram Benitez MD, 20 mg at 03/09/22 0813  •  sennosides-docusate (PERICOLACE) 8.6-50 MG per tablet 1 tablet, 1 tablet, Oral, BID, Vikram Benitez MD  •  sodium chloride 0.9 % flush 10 mL, 10 mL, Intravenous, Q12H, Nereida Estrada, APRN, 10 mL at 03/08/22 2130  •  sodium chloride 0.9 % flush 10 mL, 10 mL, Intravenous, PRN, Nereida Estrada, APRN  •  sodium  chloride 0.9 % flush 10 mL, 10 mL, Intravenous, Q12H, Zenon Ramirez MD, 10 mL at 22 2129  •  sodium chloride 0.9 % flush 10 mL, 10 mL, Intravenous, PRN, Zenon Ramirez MD  •  sodium chloride 0.9 % flush 20 mL, 20 mL, Intravenous, PRN, Zenon Ramirez MD  •  sucralfate (CARAFATE) tablet 1 g, 1 g, Oral, 4x Daily AC & at Bedtime, Case, Karen V., DO, 1 g at 22 1215    Antibiotics:  Anti-Infectives (From admission, onward)    Ordered     Dose/Rate Route Frequency Start Stop    22 1422  ampicillin-sulbactam 3 g/100 mL 0.9% NS (MBP)        Ordering Provider: Clovis Art MD    3 g Intravenous Every 6 Hours 22 1800 22 1759    22 1422  cefTRIAXone (ROCEPHIN) 2 g/100 mL 0.9% NS IVPB (MBP)        Ordering Provider: Clovis Art MD    2 g  over 30 Minutes Intravenous Every 12 Hours 22 1800 22 1759    22 1526  vancomycin in dextrose 5% 150 mL (VANCOCIN) IVPB 750 mg        Ordering Provider: Carmela Lizarraga PharmD    750 mg  over 60 Minutes Intravenous Once 22 1615 22 1701    22  piperacillin-tazobactam (ZOSYN) 3.375 g in iso-osmotic dextrose 50 ml (premix)        Ordering Provider: Grant Crouch PA    3.375 g  over 30 Minutes Intravenous Once 22 2151    22  vancomycin in dextrose 5% 150 mL (VANCOCIN) IVPB 750 mg        Ordering Provider: Grant Crouch PA    20 mg/kg × 42.6 kg Intravenous Once 22 2346            Review of Systems:  See HPI      Physical Exam:   Vital Signs  Temp (24hrs), Av.1 °F (36.7 °C), Min:97.4 °F (36.3 °C), Max:98.6 °F (37 °C)    Temp  Min: 97.4 °F (36.3 °C)  Max: 98.6 °F (37 °C)  BP  Min: 104/77  Max: 146/108  Pulse  Min: 70  Max: 115  Resp  Min: 14  Max: 18  SpO2  Min: 93 %  Max: 100 %    GENERAL: Awake and alert, in no acute distress.  She is frail and ill-appearing  HEENT: Normocephalic, atraumatic.  PERRL. EOMI. No conjunctival  injection. No icterus. Oropharynx clear without evidence of thrush or exudate.   NECK: Supple   HEART: RRR; 3/6 murmur, rubs, gallops.   LUNGS: diminished   ABDOMEN: Soft, nontender, nondistended. No rebound or guarding.   EXT: She has multiple cyanotic digits on her hands and to a lesser extent on her feet.  RA deformities of joints   :  purewick   MSK:  No joint effusions   SKIN: Warm and dry numerous bruises upper extremities.  Left pretibial wound with scabbing.   NEURO: Oriented to PPT.motor strength 5/5  PSYCHIATRIC: Normal insight and judgement. Cooperative with PE  Right PICC without redness     Laboratory Data    Results from last 7 days   Lab Units 03/09/22  0335 03/08/22  0323 03/07/22  0432   WBC 10*3/mm3 3.04* 2.76* 3.20*   HEMOGLOBIN g/dL 9.5* 9.6* 10.3*   HEMATOCRIT % 29.3* 30.6* 31.5*   PLATELETS 10*3/mm3 146 150 185     Results from last 7 days   Lab Units 03/09/22  0335   SODIUM mmol/L 143   POTASSIUM mmol/L 3.5   CHLORIDE mmol/L 113*   CO2 mmol/L 23.0   BUN mg/dL 11   CREATININE mg/dL 0.27*   GLUCOSE mg/dL 83   CALCIUM mg/dL 8.4*     Results from last 7 days   Lab Units 03/08/22  0323   ALK PHOS U/L 229*   BILIRUBIN mg/dL 0.6   ALT (SGPT) U/L 10   AST (SGOT) U/L 19             Results from last 7 days   Lab Units 03/06/22  1257   LACTATE mmol/L 1.7         Results from last 7 days   Lab Units 03/07/22  1320   VANCOMYCIN RM mcg/mL 11.10     Estimated Creatinine Clearance: 153.5 mL/min (A) (by C-G formula based on SCr of 0.27 mg/dL (L)).      Microbiology:  Blood Culture   Date Value Ref Range Status   03/05/2022 Gram Positive Cocci (C)  Preliminary     BCID, PCR   Date Value Ref Range Status   03/05/2022 (A) Negative by BCID PCR. Culture to Follow. Final    Enterococcus faecalis. Ashley/B (vancomycin resistance gene) not detected. Identification by BCID2 PCR.     No results found for: CULTURES, HSVCX, URCX  No results found for: EYECULTURE, GCCX, HSVCULTURE, LABHSV  No results found for:  LEGIONELLA, MRSACX, MUMPSCX, MYCOPLASCX  No results found for: NOCARDIACX, STOOLCX  Urine Culture   Date Value Ref Range Status   03/05/2022 No growth  Preliminary     No results found for: VIRALCULTU, WOUNDCX        Radiology:  Imaging Results (Last 72 Hours)     Procedure Component Value Units Date/Time    XR Chest 1 View [[id number]] Collected: 03/08/22 0715     Updated: 03/08/22 0720    Narrative:         DATE OF EXAM:   3/8/2022 4:18 AM     PROCEDURE:   XR CHEST 1 VW-     INDICATIONS:   Follow-up pneumonia; J18.9-Pneumonia, unspecified organism;  I48.91-Unspecified atrial fibrillation; A41.9-Sepsis, unspecified  organism; I95.9-Hypotension, unspecified; R09.02-Hypoxemia;  R13.10-Dysphagia, unspecified     COMPARISON:  03/07/2022     TECHNIQUE:   Portable chest radiograph.     FINDINGS:    Esophagogastric tube stable in appearance within the patient's large  hiatal hernia/intrathoracic stomach. There is a right upper extremity  PICC line with the tip at the cavoatrial junction. Heart size is normal.  Multifocal bilateral airspace disease most concerning for multifocal  pneumonia, stable. Small left pleural effusion. No pneumothorax.       Impression:         1. No significant change in multifocal airspace disease throughout the  lungs concerning for pneumonia.  2. Esophagogastric tube stable within the patient's large hiatal  hernia/intrathoracic stomach.  3. Stable right upper extremity PICC line. No pneumothorax.     This report was finalized on 3/8/2022 7:17 AM by Alex Roe MD.       CT Abdomen Pelvis With & Without Contrast [[id number]] Collected: 03/08/22 0012     Updated: 03/08/22 0035    Narrative:      DATE OF EXAM: 3/7/2022 6:59 PM     PROCEDURE: CT ABDOMEN PELVIS W WO CONTRAST-     INDICATIONS: Sepsis; J18.9-Pneumonia, unspecified organism;  I48.91-Unspecified atrial fibrillation; A41.9-Sepsis, unspecified  organism; I95.9-Hypotension, unspecified; R09.02-Hypoxemia;  R13.10-Dysphagia, unspecified      COMPARISON: CT abdomen and pelvis 3/3/2022     TECHNIQUE: Routine transaxial slices were obtained through the abdomen  without the administration of intravenous contrast. Additional scanning  through the abdomen and pelvis followed by the intravenous  administration of 100 mL of Isovue 300. Positive oral contrast was  administered. Reconstructed coronal and sagittal images were also  obtained. Automated exposure control and iterative construction methods  were used.     The radiation dose reduction device was turned on for each scan per the  ALARA (As Low as Reasonably Achievable) protocol.     FINDINGS:     Worsened bibasilar infiltrates concerning for pneumonia/aspiration.  Partially imaged large hiatal hernia. Redemonstration of extensive  cystic change of the liver and the lesser extent the kidneys. Some of  the liver cyst demonstrate peripheral calcifications. Unchanged splenic  cyst. Unremarkable appearance of the pancreas. The adrenal glands appear  unremarkable. No hydronephrosis or nephrolithiasis. The urinary bladder  is decompressed and not well evaluated. Redemonstration of numerous  prominent mildly dilated loops of small bowel. Many of the loops in the  lower abdomen and pelvis demonstrate marked wall thickening/submucosal  edema. A discrete transition point is not confidently identified.  Positive oral contrast is seen within the colon. A small amount of  ascites. No pneumoperitoneum. There is mesenteric hyperemia. Ectatic  abdominal aorta without aneurysm. Otherwise unremarkable appearance of  the vasculature. The bones are severely demineralized. Severe  degenerative change of the left hip. No acute osseous findings. Right  hip cephalomedullary construct.       Impression:         Worsened bibasilar infiltrates concerning for pneumonia/aspiration.     Numerous dilated loops of small bowel, many of which demonstrate  prominent wall thickening/submucosal edema and mesenteric hyperemia.  Infectious  or inflammatory enteritis cannot be excluded. No definite  small bowel obstruction with transition point. Positive oral contrast  reaches the colon.     Small ascites may be related to the above.     Sequelae of polycystic kidney and liver disease.     Redemonstration of large hiatal hernia/intrathoracic stomach.     This report was finalized on 3/8/2022 12:32 AM by Tejas Shaver MD.       XR Chest 1 View [[id number]] Collected: 03/07/22 0703     Updated: 03/07/22 0708    Narrative:         DATE OF EXAM:   3/7/2022 4:10 AM     PROCEDURE:   XR CHEST 1 VW-     INDICATIONS:   f/u respiratory illness.; J18.9-Pneumonia, unspecified organism;  I48.91-Unspecified atrial fibrillation; A41.9-Sepsis, unspecified  organism; I95.9-Hypotension, unspecified; R09.02-Hypoxemia     COMPARISON:  03/06/2022      TECHNIQUE:   Portable chest radiograph.     FINDINGS:   Placement of an esophagogastric tube with the tip within the patient's  large hiatal hernia/intrathoracic stomach. There is a right upper  extremity PICC line with the tip at the low SVC. Multifocal patchy  airspace disease throughout the lungs unchanged likely relating to  pneumonia. No pneumothorax. No large effusion. Plate and screw fixation  noted at the left proximal ureters.       Impression:      1. Placement of esophagogastric tube within the patient's large hiatal  hernia/intrathoracic stomach.  2. Stable right upper extremity PICC line.  3. Patchy multifocal airspace disease throughout the lungs concerning  for pneumonia, stable.  4. No pneumothorax.     This report was finalized on 3/7/2022 7:05 AM by Alex Roe MD.       XR Abdomen KUB [[id number]] Collected: 03/06/22 1339     Updated: 03/06/22 1410    Narrative:      DATE OF EXAM: 3/6/2022 1:14 PM     PROCEDURE: XR ABDOMEN KUB-     INDICATIONS: vomiting, tube placement; J18.9-Pneumonia, unspecified  organism; I48.91-Unspecified atrial fibrillation; A41.9-Sepsis,  unspecified organism; I95.9-Hypotension,  unspecified; R09.02-Hypoxemia     COMPARISON: 3/2/2020     TECHNIQUE: Single radiographic view of the abdomen was obtained.     FINDINGS:  There is persistent dilatation of bowel loops in the left abdomen and  upper quadrant. No pneumatosis is identified. No gross evidence of free  air is seen. There is excreted contrast material within the right renal  collecting system and urinary bladder. Atherosclerotic vascular  calcification is noted diffusely. There is degenerative change in the  spine. Severe degenerative change in the left hip. Right hip lag screw  and IM orin incidentally noted..        Impression:         1. Persistent nonspecific dilatation of bowel loops in the left upper  quadrant and left abdomen.  2. There is excreted contrast material remaining in the right renal  collecting system and bladder. This may indicate renal dysfunction given  the length of time since patient's contrasted study 8:00 PM last night.  Correlate clinically.     This report was finalized on 3/6/2022 1:44 PM by Brennen Garcia MD.         I read her radiographic studies      Echocardiogram 3/7/22:  Interpretation Summary    · Left ventricular wall thickness is consistent with mild to moderate basal asymmetric hypertrophy.  · Estimated left ventricular EF = 75% Left ventricular ejection fraction appears to be greater than 70%. Left ventricular systolic function is hyperdynamic (EF > 70%).  · Moderate mitral valve regurgitation is present with an eccentric jet noted.  · Mild aortic valve stenosis is present.  · The right atrial cavity is mild to moderately dilated.  · Estimated right ventricular systolic pressure from tricuspid regurgitation is mildly elevated (35-45 mmHg). Calculated right ventricular systolic pressure from tricuspid regurgitation is 40 mmHg.  · Left atrial volume is severely increased.  · Systolic anterior motion of the anterior mitral leaflet is present and a nonmeasured gradient is present at rest.  · The findings  are consistent with obstructive, hypertrophic cardiomyopathy.  · Mild pulmonary hypertension is present.  · There is a trivial circumferential pericardial effusion.     3/8/22:  KHARI    Interpretation Summary    · Estimated left ventricular EF = 56% Left ventricular ejection fraction appears to be 56 - 60%. Left ventricular systolic function is normal.  · There is (grade 1) layered plaque in the descending aorta present.  · Left ventricular wall thickness is consistent with moderate to severe basal asymmetric hypertrophy.  · Estimated right ventricular systolic pressure from tricuspid regurgitation is normal (<35 mmHg).  · The aortic valve exhibits mild sclerosis.  · No evidence of pulmonary hypertension is present.  · There is no evidence of pericardial effusion.  · Technically difficult and limited study due to presumed large hiatal hernia with suboptimal windows for image acquisition without definitive findings suggestive of valvular endocarditis.           Impression:   1.  Enterococcal bacteremia/? Endocarditis -I am concerned about potential enterococcal endocarditis since she has underlying significant valvular heart disease and since we do not have another obvious source for her enterococcal bacteremia.  She does not appear to have a urinary tract source, intra-abdominal source, or biliary tract source.  There is no evidence of strongyloides hyper-infection syndrome with a negative strongyloides antibody.  I think we will need to treat her for endocarditis.  She will need 6 weeks of combination intravenous antibiotic therapy.  She is now on Unasyn/ceftriaxone.  I will plan to switch to Unasyn to ampicillin soon.  2.  Aspiration pneumonia-on Unasyn therapy  3.  Recent bladder prolapse surgery  4.  Large Hiatal hernia- with 3/4 of stomach in thoracic cavity   5.  Valvular heart disease-she has significant valvular heart disease with a loud murmur.  We will need to evaluate for endocarditis as noted above.  6.   Rheumatoid arthritis-on chronic prednisone therapy   7.  Polycystic kidney and liver disease  8.  Atrial fibrillation  9.  Leukopenia   10.  Immunocompromised host   11.  Pyuria/? UTI  12.  Acute hypoxic respiratory failure-improving    PLAN/RECOMMENDATIONS:  1.  Repeat blood culture-pending  2.  Continue ceftriaxone 2 g IV every 12 hours  3.  Continue intravenous Unasyn    Dr. Art has obtained the history, performed the physical exam and formulated the above treatment plan.     I discussed her complex problem in detail with her and with her  by FaceTime video.  She will likely require transfer to rehab to complete her 6-week course of intravenous antibiotic therapy as this antibiotic regimen will be to complex to deliver at home.  She will require at least 6-8 doses daily of intravenous antibiotic therapy.  [facility]  I discussed her complex situation with Dr. Benitez  I spent over 35 minutes on her care today.    Clovis Art MD  3/9/2022  13:08 EST

## 2022-03-09 NOTE — CASE MANAGEMENT/SOCIAL WORK
Continued Stay Note  [facility]     Patient Name: Karen Larkin  MRN: [id number]  Today's Date: 3/9/2022    Admit Date: 3/5/2022     Discharge Plan     Row Name 03/09/22 1355       Plan    Plan Comments Received call from Miley and they have no beds at [facility] in [place].  She is going to  chectk the [place] at [facility] for discharges.    Final Discharge Disposition Code 30 - still a patient    Row Name 03/09/22 1204       Plan    Plan update    Patient/Family in Agreement with Plan yes    Plan Comments Referral also given to Marry at [facility].  She will review.    Final Discharge Disposition Code 30 - still a patient    Row Name 03/09/22 1154       Plan    Plan Home    Patient/Family in Agreement with Plan yes    Plan Comments I spoke with Mrs. Larkin in room regarding rehab placement at a skilled Nursing Facility.  She spoke with her family and now would like a referrral to [facility] at [place] and one to [facility].  Referral faxed to [facility] and called to Miley at [facility].  CM following.    Final Discharge Disposition Code 03 - skilled nursing facility (SNF)               Discharge Codes    No documentation.               Expected Discharge Date and Time     Expected Discharge Date Expected Discharge Time    Mar 14, 2022             Payton Ackerman RN

## 2022-03-10 LAB
ANION GAP SERPL CALCULATED.3IONS-SCNC: 11 MMOL/L (ref 5–15)
BASOPHILS # BLD AUTO: 0.02 10*3/MM3 (ref 0–0.2)
BASOPHILS NFR BLD AUTO: 0.3 % (ref 0–1.5)
BUN SERPL-MCNC: 7 MG/DL (ref 8–23)
BUN/CREAT SERPL: 24.1 (ref 7–25)
CALCIUM SPEC-SCNC: 8.6 MG/DL (ref 8.6–10.5)
CHLORIDE SERPL-SCNC: 105 MMOL/L (ref 98–107)
CO2 SERPL-SCNC: 22 MMOL/L (ref 22–29)
CREAT SERPL-MCNC: 0.29 MG/DL (ref 0.57–1)
DEPRECATED RDW RBC AUTO: 49.1 FL (ref 37–54)
EGFRCR SERPLBLD CKD-EPI 2021: 119.6 ML/MIN/1.73
EOSINOPHIL # BLD AUTO: 0.06 10*3/MM3 (ref 0–0.4)
EOSINOPHIL NFR BLD AUTO: 1 % (ref 0.3–6.2)
ERYTHROCYTE [DISTWIDTH] IN BLOOD BY AUTOMATED COUNT: 14.8 % (ref 12.3–15.4)
GLUCOSE SERPL-MCNC: 59 MG/DL (ref 65–99)
HCT VFR BLD AUTO: 35.8 % (ref 34–46.6)
HGB BLD-MCNC: 11.7 G/DL (ref 12–15.9)
IMM GRANULOCYTES # BLD AUTO: 0.13 10*3/MM3 (ref 0–0.05)
IMM GRANULOCYTES NFR BLD AUTO: 2.1 % (ref 0–0.5)
LYMPHOCYTES # BLD AUTO: 0.47 10*3/MM3 (ref 0.7–3.1)
LYMPHOCYTES NFR BLD AUTO: 7.7 % (ref 19.6–45.3)
MCH RBC QN AUTO: 29.5 PG (ref 26.6–33)
MCHC RBC AUTO-ENTMCNC: 32.7 G/DL (ref 31.5–35.7)
MCV RBC AUTO: 90.2 FL (ref 79–97)
MONOCYTES # BLD AUTO: 0.48 10*3/MM3 (ref 0.1–0.9)
MONOCYTES NFR BLD AUTO: 7.9 % (ref 5–12)
NEUTROPHILS NFR BLD AUTO: 4.94 10*3/MM3 (ref 1.7–7)
NEUTROPHILS NFR BLD AUTO: 81 % (ref 42.7–76)
NRBC BLD AUTO-RTO: 0 /100 WBC (ref 0–0.2)
PLATELET # BLD AUTO: 191 10*3/MM3 (ref 140–450)
PMV BLD AUTO: 9.9 FL (ref 6–12)
POTASSIUM SERPL-SCNC: 3.6 MMOL/L (ref 3.5–5.2)
RBC # BLD AUTO: 3.97 10*6/MM3 (ref 3.77–5.28)
SARS-COV-2 RDRP RESP QL NAA+PROBE: NORMAL
SODIUM SERPL-SCNC: 138 MMOL/L (ref 136–145)
WBC NRBC COR # BLD: 6.1 10*3/MM3 (ref 3.4–10.8)

## 2022-03-10 PROCEDURE — 87635 SARS-COV-2 COVID-19 AMP PRB: CPT | Performed by: HOSPITALIST

## 2022-03-10 PROCEDURE — 85025 COMPLETE CBC W/AUTO DIFF WBC: CPT | Performed by: INTERNAL MEDICINE

## 2022-03-10 PROCEDURE — 25010000002 ENOXAPARIN PER 10 MG: Performed by: INTERNAL MEDICINE

## 2022-03-10 PROCEDURE — 63710000001 PREDNISONE PER 5 MG: Performed by: INTERNAL MEDICINE

## 2022-03-10 PROCEDURE — 99233 SBSQ HOSP IP/OBS HIGH 50: CPT | Performed by: HOSPITALIST

## 2022-03-10 PROCEDURE — 80048 BASIC METABOLIC PNL TOTAL CA: CPT | Performed by: INTERNAL MEDICINE

## 2022-03-10 PROCEDURE — 25010000002 CEFTRIAXONE PER 250 MG: Performed by: INTERNAL MEDICINE

## 2022-03-10 PROCEDURE — 25010000002 AMPICILLIN PER 500 MG: Performed by: INTERNAL MEDICINE

## 2022-03-10 PROCEDURE — 25010000002 AMPICILLIN-SULBACTAM PER 1.5 G

## 2022-03-10 PROCEDURE — 92526 ORAL FUNCTION THERAPY: CPT

## 2022-03-10 RX ORDER — AMPICILLIN 2 G/1
2000 INJECTION, POWDER, FOR SOLUTION INTRAVENOUS
Status: DISCONTINUED | OUTPATIENT
Start: 2022-03-10 | End: 2022-03-10

## 2022-03-10 RX ORDER — METRONIDAZOLE 500 MG/1
250 TABLET ORAL EVERY 8 HOURS SCHEDULED
Status: DISCONTINUED | OUTPATIENT
Start: 2022-03-10 | End: 2022-03-11 | Stop reason: HOSPADM

## 2022-03-10 RX ADMIN — METOPROLOL TARTRATE 25 MG: 25 TABLET, FILM COATED ORAL at 09:34

## 2022-03-10 RX ADMIN — SUCRALFATE 1 G: 1 TABLET ORAL at 06:31

## 2022-03-10 RX ADMIN — AMPICILLIN 2 G: 2 INJECTION, POWDER, FOR SOLUTION INTRAVENOUS at 09:34

## 2022-03-10 RX ADMIN — PANTOPRAZOLE SODIUM 40 MG: 40 TABLET, DELAYED RELEASE ORAL at 06:28

## 2022-03-10 RX ADMIN — Medication 10 ML: at 20:06

## 2022-03-10 RX ADMIN — Medication 5 MG: at 20:04

## 2022-03-10 RX ADMIN — METRONIDAZOLE 250 MG: 500 TABLET ORAL at 09:33

## 2022-03-10 RX ADMIN — SUCRALFATE 1 G: 1 TABLET ORAL at 12:26

## 2022-03-10 RX ADMIN — SODIUM CHLORIDE 3 G: 900 INJECTION INTRAVENOUS at 00:22

## 2022-03-10 RX ADMIN — METOPROLOL TARTRATE 25 MG: 25 TABLET, FILM COATED ORAL at 20:05

## 2022-03-10 RX ADMIN — SODIUM CHLORIDE 3 G: 900 INJECTION INTRAVENOUS at 06:28

## 2022-03-10 RX ADMIN — METRONIDAZOLE 250 MG: 500 TABLET ORAL at 20:04

## 2022-03-10 RX ADMIN — DOCUSATE SODIUM 50 MG AND SENNOSIDES 8.6 MG 1 TABLET: 8.6; 5 TABLET, FILM COATED ORAL at 09:34

## 2022-03-10 RX ADMIN — SUCRALFATE 1 G: 1 TABLET ORAL at 20:04

## 2022-03-10 RX ADMIN — ENOXAPARIN SODIUM 30 MG: 30 INJECTION SUBCUTANEOUS at 06:28

## 2022-03-10 RX ADMIN — AMPICILLIN 2 G: 2 INJECTION, POWDER, FOR SOLUTION INTRAVENOUS at 23:34

## 2022-03-10 RX ADMIN — AMPICILLIN 2 G: 2 INJECTION, POWDER, FOR SOLUTION INTRAVENOUS at 17:12

## 2022-03-10 RX ADMIN — METRONIDAZOLE 250 MG: 500 TABLET ORAL at 14:40

## 2022-03-10 RX ADMIN — SODIUM CHLORIDE 2 G: 900 INJECTION INTRAVENOUS at 20:05

## 2022-03-10 RX ADMIN — PREDNISONE 10 MG: 10 TABLET ORAL at 09:33

## 2022-03-10 RX ADMIN — AMPICILLIN 2 G: 2 INJECTION, POWDER, FOR SOLUTION INTRAVENOUS at 21:28

## 2022-03-10 RX ADMIN — HYDROCODONE BITARTRATE AND ACETAMINOPHEN 1 TABLET: 5; 325 TABLET ORAL at 06:35

## 2022-03-10 RX ADMIN — SUCRALFATE 1 G: 1 TABLET ORAL at 17:12

## 2022-03-10 RX ADMIN — SODIUM CHLORIDE 2 G: 900 INJECTION INTRAVENOUS at 09:35

## 2022-03-10 RX ADMIN — DOCUSATE SODIUM 50 MG AND SENNOSIDES 8.6 MG 1 TABLET: 8.6; 5 TABLET, FILM COATED ORAL at 20:04

## 2022-03-10 RX ADMIN — HYDROCODONE BITARTRATE AND ACETAMINOPHEN 1 TABLET: 5; 325 TABLET ORAL at 20:04

## 2022-03-10 NOTE — PROGRESS NOTES
INFECTIOUS DISEASE Progress Note    Karen Larkin  1955  [id number]    Admission Date: 3/5/2022      Requesting Provider: No ref. provider found  Evaluating Physician: Clovis Art MD    Reason for Consultation: Bacteremia     History of present illness:    3/7/22: Patient is a 66 y.o. female, with PMH RA on Prednisone, large HH, autoimmune heapatitis, GERD, VHD, afib  diastolic heart failure, PCKD, seen today for Enterococcal bacteremia.  She underwent recent bladder prolapse surgery at [facility], transferred to [facility] for rehab.  Readmitted here 2/24- 3/4 with upper GI bleed, undergoing EGD which revealed complicated reflux disease, reflux esophagitis, type IV paraesophageal hernia with 3/4 of her stomach in the intrathoracic cavity. Developed nausea, vomiting, hypotension, and was transferred back to [facility] and returned 3/5 with shortness of breath, hypotension.  CTA of chest with patchy consolidative airspace disease of both lungs, with complete consolidation lower lobes.  Tmax of 99.2. Admitting labs with PCT 1.8, lactate 2.1, WBC 1.8, Sc r 0.97, Alk [id number], UA with 6-12 WBC, UC no growth.  Blood cultures now positive for E. Faecalis, not VRE, and we were consulted for evaluation and treatment. Currently on Vancomycin and Zosyn.    Review of her records at [facility] revealed an echocardiogram demonstrating aortic and mitral valvular calcifications and significant mitral valve disease with MR.    3/8/22: She has remained afebrile.  Her O2 saturation is 91% on room air. 2 sets of blood cultures from 3/5 are growing Enterococcus faecalis sensitive to ampicillin. Her creatinine this morning is 0.32. Her white blood cell count is 2.76. Strongyloidiasis antibody is pending from 3/7. I ordered a KHARI earlier today.  This was performed and revealed no overt evidence of vegetation but this was a technically difficult study due to her hiatal hernia. Overall, she feels much better  today.    3/9/22:  She remains afebrile. She continues to have some chest discomfort. Small amount of bloody sputum with cough.  No nausea.  Talking to her  on the phone.  She denies nausea, vomiting, and diarrhea.  She has decreased dyspnea.       3/10/22: She remains afebrile.  She is now out of the ICU. Repeat blood cultures from 3/8 and 3/9 are pending. She feels better today.  She denies increased cough and sputum production.  She denies nausea, vomiting, and diarrhea.  She denies abdominal pain.      Past Medical History:   Diagnosis Date   • Anemia    • Arthritis     Rheumatoid   • GERD (gastroesophageal reflux disease)    • Hiatal hernia    • Liver disease    • Murmur    • Rheumatoid arthritis (HCC)    • Varicose veins of both lower extremities        Past Surgical History:   Procedure Laterality Date   • ENDOSCOPY N/A 2/24/2022    Procedure: ESOPHAGOGASTRODUODENOSCOPY;  Surgeon: Brunner, Mark I, MD;  Location: [facility];  Service: Gastroenterology;  Laterality: N/A;   • HERNIA REPAIR     • HIP SURGERY Right    • SHOULDER SURGERY Left        Family History   Problem Relation Age of Onset   • Heart disease Mother    • Cancer Mother         Colon   • Stroke Mother    • Cancer Father         Esophageal   • Stroke Father    • Cancer Sister         Colon       Social History     Socioeconomic History   • Marital status:    Tobacco Use   • Smoking status: Never Smoker   • Smokeless tobacco: Never Used   Substance and Sexual Activity   • Alcohol use: Never   • Drug use: Never   • Sexual activity: Defer       No Known Allergies      Medication:    Current Facility-Administered Medications:   •  ampicillin (OMNIPEN) injection 2,000 mg, 2,000 mg, Intramuscular, Q4H, Clovis Art MD  •  bisacodyl (DULCOLAX) EC tablet 5 mg, 5 mg, Oral, Daily PRN, Vikram Benitez MD  •  cefTRIAXone (ROCEPHIN) 2 g/100 mL 0.9% NS IVPB (MBP), 2 g, Intravenous, Q12H, Vikram Benitez MD, 2 g at  03/09/22 2142  •  enoxaparin (LOVENOX) syringe 30 mg, 30 mg, Subcutaneous, Q24H, Vikram Benitez MD, 30 mg at 03/10/22 0628  •  HYDROcodone-acetaminophen (NORCO) 5-325 MG per tablet 1 tablet, 1 tablet, Oral, Q6H PRN, Vikram Benitez MD, 1 tablet at 03/10/22 0635  •  melatonin tablet 5 mg, 5 mg, Oral, Nightly PRN, Vikram Benitez MD, 5 mg at 03/09/22 2200  •  metoclopramide (REGLAN) injection 5 mg, 5 mg, Intravenous, Q6H PRN, Vikram Benitez MD, 5 mg at 03/07/22 0820  •  metroNIDAZOLE (FLAGYL) tablet 250 mg, 250 mg, Oral, Q8H, Clovis Art MD  •  ondansetron (ZOFRAN) injection 4 mg, 4 mg, Intravenous, Q6H PRN, Vikram Benitez MD, 4 mg at 03/08/22 1001  •  pantoprazole (PROTONIX) EC tablet 40 mg, 40 mg, Oral, Q AM, Vikram Benitez MD, 40 mg at 03/10/22 0628  •  polyethylene glycol (MIRALAX) packet 17 g, 17 g, Oral, Daily PRN, Vikram Benitez MD, 17 g at 03/07/22 0820  •  potassium chloride 20 mEq in 50 mL IVPB, 20 mEq, Intravenous, Q1H PRN, Vikram Benitez MD, Last Rate: 50 mL/hr at 03/09/22 1112, 20 mEq at 03/09/22 1112  •  potassium phosphate 45 mmol in sodium chloride 0.9 % 250 mL infusion, 45 mmol, Intravenous, PRN **OR** potassium phosphate 30 mmol in sodium chloride 0.9 % 100 mL infusion, 30 mmol, Intravenous, PRN, Last Rate: 25 mL/hr at 03/07/22 0820, 30 mmol at 03/07/22 0820 **OR** potassium phosphate 15 mmol in sodium chloride 0.9 % 100 mL infusion, 15 mmol, Intravenous, PRN, Last Rate: 50 mL/hr at 03/08/22 0438, 15 mmol at 03/08/22 0438 **OR** sodium phosphates 45 mmol in sodium chloride 0.9 % 250 mL IVPB, 45 mmol, Intravenous, PRN **OR** sodium phosphates 30 mmol in sodium chloride 0.9 % 100 mL IVPB, 30 mmol, Intravenous, PRN **OR** sodium phosphates 15 mmol in sodium chloride 0.9 % 100 mL IVPB, 15 mmol, Intravenous, PRN, Vikram Benitez MD  •  predniSONE (DELTASONE) tablet 10 mg, 10 mg, Oral, Daily With Breakfast,  Vikram Benitez MD  •  sennosides-docusate (PERICOLACE) 8.6-50 MG per tablet 1 tablet, 1 tablet, Oral, BID, Vikram Benitez MD  •  sodium chloride 0.9 % flush 10 mL, 10 mL, Intravenous, Q12H, Vikram Benitez MD, 10 mL at 03/09/22 2144  •  sodium chloride 0.9 % flush 10 mL, 10 mL, Intravenous, PRN, Vikram Benitez MD  •  sodium chloride 0.9 % flush 10 mL, 10 mL, Intravenous, Q12H, Vikram Benitez MD, 10 mL at 03/09/22 2144  •  sodium chloride 0.9 % flush 10 mL, 10 mL, Intravenous, PRN, Vikram Benitez MD  •  sodium chloride 0.9 % flush 20 mL, 20 mL, Intravenous, PRN, Vikram Benitez MD  •  sucralfate (CARAFATE) tablet 1 g, 1 g, Oral, 4x Daily AC & at Bedtime, Vikram Benitez MD, 1 g at 03/10/22 0631    Antibiotics:  Anti-Infectives (From admission, onward)    Ordered     Dose/Rate Route Frequency Start Stop    03/10/22 0736  ampicillin (OMNIPEN) injection 2,000 mg        Ordering Provider: Clovis Art MD    2,000 mg Intramuscular Every 4 Hours Scheduled 03/10/22 0830 04/16/22 0759    03/10/22 0736  metroNIDAZOLE (FLAGYL) tablet 250 mg        Ordering Provider: Clovis Art MD    250 mg Oral Every 8 Hours Scheduled 03/10/22 0830 03/17/22 0559    03/07/22 1422  cefTRIAXone (ROCEPHIN) 2 g/100 mL 0.9% NS IVPB (MBP)        Ordering Provider: Vikram Benitez MD    2 g  over 30 Minutes Intravenous Every 12 Hours 03/07/22 1800 03/21/22 0859    03/06/22 1526  vancomycin in dextrose 5% 150 mL (VANCOCIN) IVPB 750 mg        Ordering Provider: Carmela Lizarraga PharmD    750 mg  over 60 Minutes Intravenous Once 03/06/22 1615 03/06/22 1701    03/05/22 2008  piperacillin-tazobactam (ZOSYN) 3.375 g in iso-osmotic dextrose 50 ml (premix)        Ordering Provider: Grant Crouch PA    3.375 g  over 30 Minutes Intravenous Once 03/05/22 2010 03/05/22 2151    03/05/22 2008  vancomycin in dextrose 5% 150 mL (VANCOCIN) IVPB 750 mg         Ordering Provider: Grant Crouch PA    20 mg/kg × 42.6 kg Intravenous Once 22 7168            Review of Systems:  See HPI      Physical Exam:   Vital Signs  Temp (24hrs), Av.8 °F (36.6 °C), Min:97.2 °F (36.2 °C), Max:98.3 °F (36.8 °C)    Temp  Min: 97.2 °F (36.2 °C)  Max: 98.3 °F (36.8 °C)  BP  Min: 112/88  Max: 148/93  Pulse  Min: 87  Max: 117  Resp  Min: 16  Max: 18  SpO2  Min: 83 %  Max: 95 %    GENERAL: Awake and alert, in no acute distress.  She is frail and ill-appearing  HEENT: Normocephalic, atraumatic.  PERRL. EOMI. No conjunctival injection. No icterus. Oropharynx clear without evidence of thrush or exudate.   NECK: Supple   HEART: RRR; 3/6 murmur, rubs, gallops.   LUNGS: diminished   ABDOMEN: Soft, nontender, nondistended. No rebound or guarding.   EXT: She has multiple cyanotic digits on her hands and to a lesser extent on her feet.  RA deformities of joints   MSK:  No joint effusions   SKIN: Warm and dry numerous bruises upper extremities.  Left pretibial wound with scabbing.   NEURO: Oriented to PPT.motor strength 5/5  PSYCHIATRIC: Normal insight and judgement. Cooperative with PE  Right PICC: No erythema or drainage    Laboratory Data    Results from last 7 days   Lab Units 22  0335 22  0323 22  0432   WBC 10*3/mm3 3.04* 2.76* 3.20*   HEMOGLOBIN g/dL 9.5* 9.6* 10.3*   HEMATOCRIT % 29.3* 30.6* 31.5*   PLATELETS 10*3/mm3 146 150 185     Results from last 7 days   Lab Units 22  1539 22  0335   SODIUM mmol/L  --  143   POTASSIUM mmol/L 4.2 3.5   CHLORIDE mmol/L  --  113*   CO2 mmol/L  --  23.0   BUN mg/dL  --  11   CREATININE mg/dL  --  0.27*   GLUCOSE mg/dL  --  83   CALCIUM mg/dL  --  8.4*     Results from last 7 days   Lab Units 22  0323   ALK PHOS U/L 229*   BILIRUBIN mg/dL 0.6   ALT (SGPT) U/L 10   AST (SGOT) U/L 19             Results from last 7 days   Lab Units 22  1257   LACTATE mmol/L 1.7         Results from last 7 days    Lab Units 03/07/22  1320   VANCOMYCIN RM mcg/mL 11.10     Estimated Creatinine Clearance: 159.5 mL/min (A) (by C-G formula based on SCr of 0.27 mg/dL (L)).      Microbiology:  Blood Culture   Date Value Ref Range Status   03/05/2022 Gram Positive Cocci (C)  Preliminary     BCID, PCR   Date Value Ref Range Status   03/05/2022 (A) Negative by BCID PCR. Culture to Follow. Final    Enterococcus faecalis. Ashley/B (vancomycin resistance gene) not detected. Identification by BCID2 PCR.     No results found for: CULTURES, HSVCX, URCX  No results found for: EYECULTURE, GCCX, HSVCULTURE, LABHSV  No results found for: LEGIONELLA, MRSACX, MUMPSCX, MYCOPLASCX  No results found for: NOCARDIACX, STOOLCX  Urine Culture   Date Value Ref Range Status   03/05/2022 No growth  Preliminary     No results found for: VIRALCULTU, WOUNDCX        Radiology:  Imaging Results (Last 72 Hours)     Procedure Component Value Units Date/Time    XR Chest 1 View [[id number]] Collected: 03/08/22 0715     Updated: 03/08/22 0720    Narrative:         DATE OF EXAM:   3/8/2022 4:18 AM     PROCEDURE:   XR CHEST 1 VW-     INDICATIONS:   Follow-up pneumonia; J18.9-Pneumonia, unspecified organism;  I48.91-Unspecified atrial fibrillation; A41.9-Sepsis, unspecified  organism; I95.9-Hypotension, unspecified; R09.02-Hypoxemia;  R13.10-Dysphagia, unspecified     COMPARISON:  03/07/2022     TECHNIQUE:   Portable chest radiograph.     FINDINGS:    Esophagogastric tube stable in appearance within the patient's large  hiatal hernia/intrathoracic stomach. There is a right upper extremity  PICC line with the tip at the cavoatrial junction. Heart size is normal.  Multifocal bilateral airspace disease most concerning for multifocal  pneumonia, stable. Small left pleural effusion. No pneumothorax.       Impression:         1. No significant change in multifocal airspace disease throughout the  lungs concerning for pneumonia.  2. Esophagogastric tube stable within the  patient's large hiatal  hernia/intrathoracic stomach.  3. Stable right upper extremity PICC line. No pneumothorax.     This report was finalized on 3/8/2022 7:17 AM by Alex Roe MD.       CT Abdomen Pelvis With & Without Contrast [[id number]] Collected: 03/08/22 0012     Updated: 03/08/22 0035    Narrative:      DATE OF EXAM: 3/7/2022 6:59 PM     PROCEDURE: CT ABDOMEN PELVIS W WO CONTRAST-     INDICATIONS: Sepsis; J18.9-Pneumonia, unspecified organism;  I48.91-Unspecified atrial fibrillation; A41.9-Sepsis, unspecified  organism; I95.9-Hypotension, unspecified; R09.02-Hypoxemia;  R13.10-Dysphagia, unspecified     COMPARISON: CT abdomen and pelvis 3/3/2022     TECHNIQUE: Routine transaxial slices were obtained through the abdomen  without the administration of intravenous contrast. Additional scanning  through the abdomen and pelvis followed by the intravenous  administration of 100 mL of Isovue 300. Positive oral contrast was  administered. Reconstructed coronal and sagittal images were also  obtained. Automated exposure control and iterative construction methods  were used.     The radiation dose reduction device was turned on for each scan per the  ALARA (As Low as Reasonably Achievable) protocol.     FINDINGS:     Worsened bibasilar infiltrates concerning for pneumonia/aspiration.  Partially imaged large hiatal hernia. Redemonstration of extensive  cystic change of the liver and the lesser extent the kidneys. Some of  the liver cyst demonstrate peripheral calcifications. Unchanged splenic  cyst. Unremarkable appearance of the pancreas. The adrenal glands appear  unremarkable. No hydronephrosis or nephrolithiasis. The urinary bladder  is decompressed and not well evaluated. Redemonstration of numerous  prominent mildly dilated loops of small bowel. Many of the loops in the  lower abdomen and pelvis demonstrate marked wall thickening/submucosal  edema. A discrete transition point is not confidently  identified.  Positive oral contrast is seen within the colon. A small amount of  ascites. No pneumoperitoneum. There is mesenteric hyperemia. Ectatic  abdominal aorta without aneurysm. Otherwise unremarkable appearance of  the vasculature. The bones are severely demineralized. Severe  degenerative change of the left hip. No acute osseous findings. Right  hip cephalomedullary construct.       Impression:         Worsened bibasilar infiltrates concerning for pneumonia/aspiration.     Numerous dilated loops of small bowel, many of which demonstrate  prominent wall thickening/submucosal edema and mesenteric hyperemia.  Infectious or inflammatory enteritis cannot be excluded. No definite  small bowel obstruction with transition point. Positive oral contrast  reaches the colon.     Small ascites may be related to the above.     Sequelae of polycystic kidney and liver disease.     Redemonstration of large hiatal hernia/intrathoracic stomach.     This report was finalized on 3/8/2022 12:32 AM by Tejas Shaver MD.         I read her radiographic studies      Echocardiogram 3/7/22:  Interpretation Summary    · Left ventricular wall thickness is consistent with mild to moderate basal asymmetric hypertrophy.  · Estimated left ventricular EF = 75% Left ventricular ejection fraction appears to be greater than 70%. Left ventricular systolic function is hyperdynamic (EF > 70%).  · Moderate mitral valve regurgitation is present with an eccentric jet noted.  · Mild aortic valve stenosis is present.  · The right atrial cavity is mild to moderately dilated.  · Estimated right ventricular systolic pressure from tricuspid regurgitation is mildly elevated (35-45 mmHg). Calculated right ventricular systolic pressure from tricuspid regurgitation is 40 mmHg.  · Left atrial volume is severely increased.  · Systolic anterior motion of the anterior mitral leaflet is present and a nonmeasured gradient is present at rest.  · The findings are  consistent with obstructive, hypertrophic cardiomyopathy.  · Mild pulmonary hypertension is present.  · There is a trivial circumferential pericardial effusion.     3/8/22:  KHARI    Interpretation Summary    · Estimated left ventricular EF = 56% Left ventricular ejection fraction appears to be 56 - 60%. Left ventricular systolic function is normal.  · There is (grade 1) layered plaque in the descending aorta present.  · Left ventricular wall thickness is consistent with moderate to severe basal asymmetric hypertrophy.  · Estimated right ventricular systolic pressure from tricuspid regurgitation is normal (<35 mmHg).  · The aortic valve exhibits mild sclerosis.  · No evidence of pulmonary hypertension is present.  · There is no evidence of pericardial effusion.  · Technically difficult and limited study due to presumed large hiatal hernia with suboptimal windows for image acquisition without definitive findings suggestive of valvular endocarditis.           Impression:   1.  Enterococcal bacteremia/? Endocarditis -I am concerned about enterococcal endocarditis since she has underlying significant valvular heart disease and since we do not have another obvious source for her enterococcal bacteremia.  She does not appear to have a urinary tract source, intra-abdominal source, or biliary tract source.  There is no evidence of strongyloides hyper-infection syndrome with a negative strongyloides antibody.  I think we will need to treat her for endocarditis.  She will need 6 weeks of combination intravenous antibiotic therapy.  I will switch her antibiotic regimen from Unasyn/ceftriaxone to high-dose ampicillin/ceftriaxone.  2.  Aspiration pneumonia-I will add oral metronidazole to her ampicillin/ceftriaxone therapy.  3.  Recent bladder prolapse surgery  4.  Large Hiatal hernia- with 3/4 of stomach in thoracic cavity   5.  Valvular heart disease-she has significant valvular heart disease with a loud murmur.  We will need to  evaluate for endocarditis as noted above.  6.  Rheumatoid arthritis-on chronic prednisone therapy   7.  Polycystic kidney and liver disease  8.  Atrial fibrillation  9.  Leukopenia   10.  Immunocompromised host   11.  Pyuria/? UTI  12.  Acute hypoxic respiratory failure-improving    PLAN/RECOMMENDATIONS:  1.  Repeat blood culture-pending  2.  Continue ceftriaxone 2 g IV every 12 hours-ending on 4/16  3.  Discontinue Unasyn  4.  Ampicillin 2 g IV every 4 hours-ending on 4/16  5.  Flagyl 250 mg by mouth 3 times a day      She will likely require transfer to rehab to complete her 6-week course of intravenous antibiotic therapy as this antibiotic regimen will be to complex to deliver at home.  She will require at least 8 doses daily of intravenous antibiotic therapy.  [facility]  I discussed her complex situation with Dr. Yohannes Chun today.  I spent over 35 minutes on her care today.    Clovis Art MD  3/10/2022  07:37 EST

## 2022-03-10 NOTE — PROGRESS NOTES
[facility] Medicine Services  PROGRESS NOTE    Patient Name: Karen Larkin  : 1955  MRN: [id number]    Date of Admission: 3/5/2022  Primary Care Physician: Margarita Mcgovern MD    Subjective   Subjective     CC:  Weakness    HPI:  Tired. No f/c. No n/v. Tolerating PO, slow, poor appetite. No BM yet. Denies dysuria. Denies pain.     Review of Systems   Constitutional: Positive for activity change, appetite change and fatigue. Negative for chills, diaphoresis and fever.   HENT: Negative.    Respiratory: Positive for cough.    Cardiovascular: Negative.    Gastrointestinal: Positive for constipation.   Genitourinary: Negative.    Neurological: Positive for weakness.         Objective   Objective     Vital Signs:   Temp:  [97.2 °F (36.2 °C)-98.3 °F (36.8 °C)] 97.2 °F (36.2 °C)  Heart Rate:  [] 106  Resp:  [16-18] 18  BP: (112-148)/() 148/93     Physical Exam:  NAD, alert and oriented  OP clear, MMM  Neck supple  No LAD  Tachy  Rales B bases  +BS, ND, NT, soft  KAPOOR, generalized weakness, mild pedal edema  No obvious rashes  Flat affect    Results Reviewed:  LAB RESULTS:      Lab 03/10/22  0657 22  0335 22  0323 22  0432 22  0307 22  1257 22  1934   WBC 6.10 3.04* 2.76* 3.20* 2.66* 4.83 1.88*   HEMOGLOBIN 11.7* 9.5* 9.6* 10.3* 8.8* 11.8* 13.2   HEMATOCRIT 35.8 29.3* 30.6* 31.5* 28.2* 35.8 39.6   PLATELETS 191 146 150 185 152 232 264   NEUTROS ABS 4.94 2.39 2.32  --   --  4.50 1.54*   IMMATURE GRANS (ABS) 0.13* 0.05 0.05  --   --  0.02 0.01   LYMPHS ABS 0.47* 0.31* 0.14*  --   --  0.14* 0.12*   MONOS ABS 0.48 0.27 0.24  --   --  0.15 0.18   EOS ABS 0.06 0.02 0.00  --   --  0.00 0.02   MCV 90.2 93.3 95.0 90.5 96.6 89.7 89.2   PROCALCITONIN  --   --   --   --   --   --  1.88*   LACTATE  --   --   --   --   --  1.7 2.1*         Lab 22  1539 22  0335 22  1109 22  0323 22  1515 22  0432 22  0307   SODIUM   --  143  --  143 144 142 145   POTASSIUM 4.2 3.5 4.4 3.3* 3.3* 2.9* 2.4*   CHLORIDE  --  113*  --  116* 113* 109* 116*   CO2  --  23.0  --  20.0* 20.0* 23.0 19.0*   ANION GAP  --  7.0  --  7.0 11.0 10.0 10.0   BUN  --  11  --  14 20 23 19   CREATININE  --  0.27*  --  0.32* 0.32* 0.38* 0.36*   EGFR  --  121.7  --  116.8 116.8 112.1 113.5   GLUCOSE  --  83  --  290* 125* 142* 880*   CALCIUM  --  8.4*  --  7.9* 8.2* 8.5* 6.7*   MAGNESIUM  --   --   --  1.8  --  2.1 1.7   PHOSPHORUS  --  2.6 2.8 1.9* 2.9 1.7* 1.4*         Lab 03/09/22  0335 03/08/22  0323 03/07/22  1515 03/05/22  1934   TOTAL PROTEIN  --  4.8*  --  6.5   ALBUMIN 2.50* 2.50* 2.50* 3.50   GLOBULIN  --  2.3  --  3.0   ALT (SGPT)  --  10  --  8   AST (SGOT)  --  19  --  19   BILIRUBIN  --  0.6  --  2.0*   ALK PHOS  --  229*  --  245*         Lab 03/05/22  1934   PROBNP 2,577.0*   TROPONIN T 0.019                 Lab 03/05/22  2242   PH, ARTERIAL 7.450   PCO2, ARTERIAL 31.1*   PO2 ART 93.9   FIO2 80   HCO3 ART 21.6   BASE EXCESS ART -1.6*   CARBOXYHEMOGLOBIN 0.8     Brief Urine Lab Results  (Last result in the past 365 days)      Color   Clarity   Blood   Leuk Est   Nitrite   Protein   CREAT   Urine HCG        03/05/22 2145 Dark Yellow   Cloudy   Large (3+)   Small (1+)   Negative   30 mg/dL (1+)                 Microbiology Results Abnormal     Procedure Component Value - Date/Time    Blood Culture - Blood, Blood, Central Line [[id number]]  (Normal) Collected: 03/08/22 1109    Lab Status: Preliminary result Specimen: Blood, Central Line Updated: 03/09/22 1148     Blood Culture No growth at 24 hours    Urine Culture - Urine, Urine, Catheter [[id number]] Collected: 03/05/22 2145    Lab Status: Final result Specimen: Urine, Catheter Updated: 03/07/22 1503     Urine Culture 25,000 CFU/mL Mixed Arnaldo Isolated    Narrative:      Specimen contains mixed organisms of questionable pathogenicity which indicates contamination with commensal arnaldo.  Further  identification is unlikely to provide clinically useful information.  Suggest recollection.    COVID PRE-OP / PRE-PROCEDURE SCREENING ORDER (NO ISOLATION) - Swab, Nasopharynx [[id number]]  (Normal) Collected: 03/05/22 2008    Lab Status: Final result Specimen: Swab from Nasopharynx Updated: 03/05/22 2042    Narrative:      The following orders were created for panel order COVID PRE-OP / PRE-PROCEDURE SCREENING ORDER (NO ISOLATION) - Swab, Nasopharynx.  Procedure                               Abnormality         Status                     ---------                               -----------         ------                     COVID-19 and FLU A/B PCR...[[id number]]  Normal              Final result                 Please view results for these tests on the individual orders.    COVID-19 and FLU A/B PCR - Swab, Nasopharynx [[id number]]  (Normal) Collected: 03/05/22 2008    Lab Status: Final result Specimen: Swab from Nasopharynx Updated: 03/05/22 2042     COVID19 Not Detected     Influenza A PCR Not Detected     Influenza B PCR Not Detected    Narrative:      Fact sheet for providers: https://www.fda.gov/media/087518/download    Fact sheet for patients: https://www.fda.gov/media/053504/download    Test performed by PCR.          Adult Transesophageal Echo (KHARI) W/ Cont if Necessary Per Protocol    Result Date: 3/8/2022  · Estimated left ventricular EF = 56% Left ventricular ejection fraction appears to be 56 - 60%. Left ventricular systolic function is normal. · There is (grade 1) layered plaque in the descending aorta present. · Left ventricular wall thickness is consistent with moderate to severe basal asymmetric hypertrophy. · Estimated right ventricular systolic pressure from tricuspid regurgitation is normal (<35 mmHg). · The aortic valve exhibits mild sclerosis. · No evidence of pulmonary hypertension is present. · There is no evidence of pericardial effusion. · Technically difficult and limited study due to presumed  large hiatal hernia with suboptimal windows for image acquisition without definitive findings suggestive of valvular endocarditis.        Results for orders placed during the hospital encounter of 03/05/22    Adult Transesophageal Echo (KHARI) W/ Cont if Necessary Per Protocol    Interpretation Summary  · Estimated left ventricular EF = 56% Left ventricular ejection fraction appears to be 56 - 60%. Left ventricular systolic function is normal.  · There is (grade 1) layered plaque in the descending aorta present.  · Left ventricular wall thickness is consistent with moderate to severe basal asymmetric hypertrophy.  · Estimated right ventricular systolic pressure from tricuspid regurgitation is normal (<35 mmHg).  · The aortic valve exhibits mild sclerosis.  · No evidence of pulmonary hypertension is present.  · There is no evidence of pericardial effusion.  · Technically difficult and limited study due to presumed large hiatal hernia with suboptimal windows for image acquisition without definitive findings suggestive of valvular endocarditis.      I have reviewed the medications:  Scheduled Meds:ampicillin 2 g in 0.9% sodium chloride IVPB, 2 g, Intravenous, Q4H  cefTRIAXone, 2 g, Intravenous, Q12H  enoxaparin, 30 mg, Subcutaneous, Q24H  metoprolol tartrate, 25 mg, Oral, Q12H  metroNIDAZOLE, 250 mg, Oral, Q8H  pantoprazole, 40 mg, Oral, Q AM  predniSONE, 10 mg, Oral, Daily With Breakfast  senna-docusate sodium, 1 tablet, Oral, BID  sodium chloride, 10 mL, Intravenous, Q12H  sodium chloride, 10 mL, Intravenous, Q12H  sucralfate, 1 g, Oral, 4x Daily AC & at Bedtime      Continuous Infusions:   PRN Meds:.bisacodyl  •  HYDROcodone-acetaminophen  •  melatonin  •  metoclopramide  •  ondansetron  •  polyethylene glycol  •  potassium chloride  •  potassium phosphate infusion greater than 15 mMoles **OR** potassium phosphate infusion greater than 15 mMoles **OR** potassium phosphate infusion 15 mMol or less **OR** sodium  phosphate IVPB **OR** sodium phosphate IVPB **OR** sodium phosphate IVPB  •  sodium chloride  •  sodium chloride  •  sodium chloride    Assessment/Plan   Assessment & Plan     Active Hospital Problems    Diagnosis  POA   • **Severe sepsis (HCC) [A41.9, R65.20]  Yes   • Elevated brain natriuretic peptide (BNP) level [R79.89]  Yes   • Elevated BUN [R79.9]  Yes   • Dehydration [E86.0]  Yes   • Elevated alkaline phosphatase level [R74.8]  Yes   • Serum total bilirubin elevated [R17]  Yes   • Leukopenia [D72.819]  Yes   • Aspiration pneumonia (HCC) [J69.0]  Yes   • Acute respiratory failure with hypoxia (HCC) [J96.01]  Yes   • PAF (paroxysmal atrial fibrillation) (HCC) [I48.0]  Yes   • Rheumatoid arthritis [M06.9]  Yes   • Hiatal hernia with gastroesophageal reflux [K21.9, K44.9]  Yes      Resolved Hospital Problems    Diagnosis Date Resolved POA   • Elevated lactic acid level [R79.89] 03/07/2022 Yes        Brief Hospital Course to date:  66-year-old female with a past medical history that includes RA on chronic prednisone, PAF on Xarelto, hypertension, dyslipidemia, GERD, iron deficiency, status post recent bladder prolapse repair at [facility], and anemia.  She was at [facility] and was sent to our ER for shortness of breath and generalized weakness.  She has had several recent admissions including admission here recently for hematemesis in which she was found to have a large paraesophageal hernia as well as erosive gastric reflux disease.  She underwent CT of the chest which revealed no PE but significant consolidation of both lower lobes with debris in the esophagus.  She was given vancomycin and Zosyn and sepsis bolus protocol.    Bacteremia  Suspected aspiration PNA  -enterococcus with ampicillin sensitivity, antibiotics per ID  -KHARI reviewed, ID planning treatment for potential endocarditis given valvular abnormalities    Aspiration PNA  -mobilize/pulmonary toilet  -on RA    Large HH/reflux    RA  on chronic steroids/immunosuppressed    Tachycardia/PAF  -resume home BB  -ECHO reviewed  -off AC given recent GIB    DVT prophylaxis:  Medical DVT prophylaxis orders are present.       AM-PAC 6 Clicks Score (PT): 11 (03/09/22 1326)    Disposition: I expect the patient to be discharged TBD.    CODE STATUS:   Code Status and Medical Interventions:   Ordered at: 03/05/22 1015     Code Status (Patient has no pulse and is not breathing):    CPR (Attempt to Resuscitate)     Medical Interventions (Patient has pulse or is breathing):    Full Support       Yohannes Chun MD  03/10/22

## 2022-03-10 NOTE — CASE MANAGEMENT/SOCIAL WORK
Case Management Discharge Note      Final Note: Patient has been accepted to [facility].  Patient will be going to the fourth floor.  Primary RN to call report to [phone]. CM to fax DC summary t [phone].  [facility] will transport patient at 09:45.  EMS run sheet is on chart.  Patient and  aware and agreeable with plan of care.         Selected Continued Care - Admitted Since 3/5/2022     Destination Coordination complete.    Service Provider Selected Services Address Phone Fax Patient Preferred    [facility]  Long Term Acute Care [address] [facility] [address] [phone] --          Durable Medical Equipment    No services have been selected for the patient.              Dialysis/Infusion    No services have been selected for the patient.              Home Medical Care    No services have been selected for the patient.              Therapy    No services have been selected for the patient.              Community Resources    No services have been selected for the patient.              Community & DME    No services have been selected for the patient.                Selected Continued Care - Prior Encounters Includes selections from prior encounters from 12/5/2021 to 3/10/2022    Discharged on 3/4/2022 Admission date: 2/24/2022 - Discharge disposition: Rehab Facility or Unit (DC - External)    Destination     Service Provider Selected Services Address Phone Fax Patient Preferred    [facility]  [facility] [address] [facility] [address] [phone] --                         Final Discharge Disposition Code: 63 - LTCH

## 2022-03-10 NOTE — THERAPY DISCHARGE NOTE
Acute Care - Speech Language Pathology   Swallow Treatment Note/Discharge [facility]     Patient Name: Karen Larkin  : 1955  MRN: [id number]  Today's Date: 3/10/2022               Admit Date: 3/5/2022    Visit Dx:    ICD-10-CM ICD-9-CM   1. Multifocal pneumonia  J18.9 486   2. Atrial fibrillation with RVR (HCC)  I48.91 427.31   3. Sepsis, due to unspecified organism, unspecified whether acute organ dysfunction present (HCC)  A41.9 038.9     995.91   4. Hypotension, unspecified hypotension type  I95.9 458.9   5. Hypoxemia  R09.02 799.02   6. Dysphagia, unspecified type  R13.10 787.20     Patient Active Problem List   Diagnosis   • Severe sepsis (HCC)   • Hiatal hernia with gastroesophageal reflux   • Rheumatoid arthritis   • Immunosuppression due to chronic steroid use   • Atrial fibrillation (HCC)   • Polycystic liver disease   • Chronic pain on Narcotics   • Pelvic prolapse   • Severe malnutrition (CMS/HCC)   • Coffee ground emesis   • Essential hypertension   • PAF (paroxysmal atrial fibrillation) (HCC)   • Chest pain, atypical   • Infarction of parietal lobe (HCC)   • Elevated brain natriuretic peptide (BNP) level   • Elevated BUN   • Dehydration   • Elevated alkaline phosphatase level   • Serum total bilirubin elevated   • Leukopenia   • Aspiration pneumonia (HCC)   • Acute respiratory failure with hypoxia (HCC)     Past Medical History:   Diagnosis Date   • Anemia    • Arthritis     Rheumatoid   • GERD (gastroesophageal reflux disease)    • Hiatal hernia    • Liver disease    • Murmur    • Rheumatoid arthritis (HCC)    • Varicose veins of both lower extremities      Past Surgical History:   Procedure Laterality Date   • ENDOSCOPY N/A 2022    Procedure: ESOPHAGOGASTRODUODENOSCOPY;  Surgeon: Brunner, Mark I, MD;  [facility] ENDOSCOPY;  Service: Gastroenterology;  Laterality: N/A;   • HERNIA REPAIR     • HIP SURGERY Right    • SHOULDER SURGERY Left        SLP Recommendation and Plan                                                                 Progress: improving (03/10/22 1394)    SWALLOW EVALUATION (last 72 hours)     SLP Adult Swallow Evaluation     Row Name 03/10/22 1300 03/08/22 1015 03/07/22 1400             Rehab Evaluation    Document Type therapy note (daily note)  -AW re-evaluation  - evaluation  -      Subjective Information no complaints  -AW no complaints  -SM no complaints  -CH      Patient Observations alert;cooperative  -AW alert;cooperative  -SM alert;cooperative;agree to therapy  -      Patient/Family/Caregiver Comments/Observations dtr present  -AW -- No family present  -CH      Patient Effort good  -AW -- good  -CH      Symptoms Noted During/After Treatment none  -AW -- none  -CH              General Information    Patient Profile Reviewed -- -- yes  -      Pertinent History Of Current Problem -- -- Patient admitted with aspiration pneumonia, lrg paraesophageal hernia and erosive gastric reflux with bleeding. CXR: Near complete consolidation of both lower lobes.  -      Current Method of Nutrition -- NPO;nasogastric feedings  - NPO;nasogastric feedings  -      Precautions/Limitations, Vision -- -- WFL;for purposes of eval  -CH      Precautions/Limitations, Hearing -- -- WFL;for purposes of eval  -CH      Prior Level of Function-Communication -- -- WFL  -      Prior Level of Function-Swallowing -- -- no diet consistency restrictions  -      Plans/Goals Discussed with -- patient;agreed upon  - patient;agreed upon  -      Barriers to Rehab -- none identified  - medically complex  -      Patient's Goals for Discharge -- return to PO diet  - return to PO diet  -              Pain    Additional Documentation -- Pain Scale: Numbers Pre/Post-Treatment (Group)  - Pain Scale: FACES Pre/Post-Treatment (Group)  -              Pain Scale: Numbers Pre/Post-Treatment    Pretreatment Pain Rating -- 0/10 - no pain  - --      Posttreatment Pain Rating -- 0/10 - no  pain  -SM --              Pain Scale: FACES Pre/Post-Treatment    Pain: FACES Scale, Pretreatment -- -- 0-->no hurt  -CH      Posttreatment Pain Rating -- -- 0-->no hurt  -CH              Oral Motor Structure and Function    Dentition Assessment -- -- natural, present and adequate  -CH      Secretion Management -- -- WNL/WFL  -CH      Mucosal Quality -- -- dry  -CH      Volitional Swallow -- -- delayed;weak  -CH      Volitional Cough -- -- weak  -CH              Oral Musculature and Cranial Nerve Assessment    Oral Motor General Assessment -- -- generalized oral motor weakness  -              General Eating/Swallowing Observations    Respiratory Support Currently in Use -- -- nasal cannula;other (see comments)  3L  -CH      Eating/Swallowing Skills -- -- fed by SLP  -      Positioning During Eating -- -- upright in bed;upright 90 degree  -      Utensils Used -- -- spoon  -CH      Consistencies Trialed -- -- ice chips;thin liquids;pureed  -      Pre SpO2 (%) -- -- 96  -CH      Post SpO2 (%) -- -- 94  -CH              Respiratory    Respiratory Status -- -- WFL  -CH              Clinical Swallow Eval    Oral Prep Phase -- -- impaired  -CH      Oral Transit -- -- impaired  -CH      Oral Residue -- -- WFL  -CH      Pharyngeal Phase -- no overt signs/symptoms of pharyngeal impairment  - suspected pharyngeal impairment  -      Esophageal Phase -- -- suspected esophageal impairment  -      Clinical Swallow Evaluation Summary -- Oral difficulties r/t edentulous state. Esophageal difficulties r/t known GERD/hernia. Otherwise significantly improved, showing no s/s aspiration. Suspect PNA could be more related to known vomiting.  - Patient was given trials of thin via ice chips and thin H2O via teaspoon and pureed. Patient felt as though all trials were stuck in ther throat. Multiple swallows with all constencies and wet vocal quailty w thin via teaspoon. Patient began gagging and vomited immediately following  evaluation. recommend continued NPO with alternate means of nutrition. Ok for 3-4 ice chips per hour per MD discretion. SLP to re-evaluate swallow as appropriate.  -              Oral Prep Concerns    Oral Prep Concerns -- -- oral holding;prolonged mastication  -CH      Oral Holding -- -- all consistencies  -CH      Prolonged Mastication -- -- all consistencies  -CH              Oral Transit Concerns    Oral Transit Concerns -- -- increased oral transit time;delayed initiation of bolus transit  -      Delayed Intiation of Bolus Transit -- -- all consistencies  -CH      Increased Oral Transit Time -- -- all consistencies  -CH              Pharyngeal Phase Concerns    Pharyngeal Phase Concerns -- -- wet vocal quality;multiple swallows  -              Esophageal Phase Concerns    Esophageal Phase Concerns -- -- sensation of material sticking  -      Sensation of Material Sticking -- -- all consistencies  -              SLP Evaluation Clinical Impression    SLP Swallowing Diagnosis -- esophageal dysphagia  - mild;oral dysphagia;suspected pharyngeal dysphagia;esophageal dysphagia  -      Functional Impact -- risk of aspiration/pneumonia  - risk of aspiration/pneumonia;risk of malnutrition;risk of dehydration  -      Rehab Potential/Prognosis, Swallowing -- good, to achieve stated therapy goals  - adequate, monitor progress closely  -      Swallow Criteria for Skilled Therapeutic Interventions Met -- demonstrates skilled criteria  - demonstrates skilled criteria  -              Recommendations    Therapy Frequency (Swallow) -- 5 days per week  - evaluation only  -      Predicted Duration Therapy Intervention (Days) -- until discharge  - until discharge  -      SLP Diet Recommendation -- soft textures;chopped;thin liquids  - NPO;temporary alternate methods of nutrition/hydration;other (see comments)  OK for 3-4 ice chips per hr at MD discretion  -      Recommended Diagnostics -- --  reassess via clinical swallow evaluation  -      Recommended Precautions and Strategies -- upright posture during/after eating;general aspiration precautions;reflux precautions  - --      Oral Care Recommendations -- Oral Care BID/PRN  - Oral Care BID/PRN;Suction toothbrush  -      SLP Rec. for Method of Medication Administration -- as tolerated  pt cuts some and/pr places in puree at baseline  - meds via alternate route  -      Monitor for Signs of Aspiration -- notify SLP if any concerns  - --      Anticipated Discharge Disposition (SLP) -- -- unknown;anticipate therapy at next level of care  -      Demonstrates Need for Referral to Another Service -- clinical nutrition services/dietitian;other (see comments)  to assist with solids r/t GERD - adjust to pt's preference  - --            User Key  (r) = Recorded By, (t) = Taken By, (c) = Cosigned By    Initials Name Effective Dates     Sandra Hubbard, MS CCC-SLP 06/16/21 -     AW Cheryl Grove, MS CCC-SLP 06/16/21 -     CH Florinda Bob, MS CCC-SLP 06/16/21 -                 EDUCATION  The patient has been educated in the following areas:   Dysphagia (Swallowing Impairment).         SLP GOALS     Row Name 03/10/22 1300             Oral Nutrition/Hydration Goal 1 (SLP)    Oral Nutrition/Hydration Goal 1, SLP Patient will tolerate least restrictive diet without s/s aspiration 100% of the time.  -AW      Time Frame (Oral Nutrition/Hydration Goal 1, SLP) by discharge  -AW      Barriers (Oral Nutrition/Hydration Goal 1, SLP) pt wishes to stay on mech soft at this time. Uses caution for esophageal concerns.  -AW      Progress/Outcomes (Oral Nutrition/Hydration Goal 1, SLP) goal met  -AW              Swallow Compensatory Strategies Goal 1 (SLP)    Activity (Swallow Compensatory Strategies/Techniques Goal 1, SLP) small bites;small cup sips;small straw sips  -AW      [place]/Accuracy (Swallow Compensatory Strategies/Techniques Goal 1,  SLP) independently (over 90% accuracy)  -AW      Time Frame (Swallow Compensatory Strategies/Techniques Goal 1, SLP) by discharge  -AW      Progress/Outcomes (Swallow Compensatory Strategies/Techniques Goal 1, SLP) goal met  -AW            User Key  (r) = Recorded By, (t) = Taken By, (c) = Cosigned By    Initials Name Provider Type    Cheryl Storm MS CCC-SLP Speech and Language Pathologist                     Time Calculation:    Time Calculation- SLP     Row Name 03/10/22 1336             Time Calculation- SLP    SLP Start Time 1230  -AW      SLP Stop Time 1300  -AW      SLP Time Calculation (min) 30 min  -AW      SLP Received On 03/10/22  -AW            User Key  (r) = Recorded By, (t) = Taken By, (c) = Cosigned By    Initials Name Provider Type    Cheryl Storm MS CCC-SLP Speech and Language Pathologist                Therapy Charges for Today     Code Description Service Date Service Provider Modifiers Qty    87043579828 HC ST TREATMENT SWALLOW 2 3/10/2022 Cheryl Grove MS CCC-SLP GN 1             Patient was not wearing a face mask and did not exhibit coughing during this therapy encounter.  Procedure performed was aerosolizing, did not involve close contact (within 6 feet for at least 15 minutes or longer), and did not involve contact with infectious secretions or specimens.  Therapist used appropriate personal protective equipment including gloves, standard procedure mask and eye protection.  Appropriate PPE was worn during the entire therapy session.  Hand hygiene was completed before and after therapy session.          MS SCARLETT Cabezas  3/10/2022

## 2022-03-10 NOTE — PLAN OF CARE
Problem: Adult Inpatient Plan of Care  Goal: Plan of Care Review  Outcome: Ongoing, Progressing  Flowsheets  Taken 3/10/2022 1334 by Cheryl Grove MS CCC-SLP  Progress: improving  Taken 3/9/2022 1214 by Alea Zamora PT  Plan of Care Reviewed With: patient   Goal Outcome Evaluation:           Progress: improving   SLP treatment completed. Will sign-off dysphagia - pt desires to continue with [facility] soft diet. May advance herself to regular solids as desired. Please see note for further details and recommendations.

## 2022-03-11 VITALS
RESPIRATION RATE: 18 BRPM | HEART RATE: 98 BPM | OXYGEN SATURATION: 96 % | DIASTOLIC BLOOD PRESSURE: 98 MMHG | HEIGHT: 56 IN | TEMPERATURE: 97.7 F | SYSTOLIC BLOOD PRESSURE: 136 MMHG | BODY MASS INDEX: 28.66 KG/M2 | WEIGHT: 127.43 LBS

## 2022-03-11 PROCEDURE — 25010000002 AMPICILLIN PER 500 MG: Performed by: INTERNAL MEDICINE

## 2022-03-11 PROCEDURE — 63710000001 PREDNISONE PER 5 MG: Performed by: INTERNAL MEDICINE

## 2022-03-11 PROCEDURE — 25010000002 CEFTRIAXONE PER 250 MG: Performed by: INTERNAL MEDICINE

## 2022-03-11 PROCEDURE — 99239 HOSP IP/OBS DSCHRG MGMT >30: CPT | Performed by: HOSPITALIST

## 2022-03-11 PROCEDURE — 25010000002 ENOXAPARIN PER 10 MG: Performed by: INTERNAL MEDICINE

## 2022-03-11 RX ORDER — HYDROCODONE BITARTRATE AND ACETAMINOPHEN 5; 325 MG/1; MG/1
1 TABLET ORAL EVERY 6 HOURS PRN
Refills: 0
Start: 2022-03-11 | End: 2022-03-15

## 2022-03-11 RX ORDER — ATORVASTATIN CALCIUM 10 MG/1
10 TABLET, FILM COATED ORAL DAILY
Qty: 30 TABLET | Refills: 1
Start: 2022-03-11

## 2022-03-11 RX ORDER — METRONIDAZOLE 250 MG/1
250 TABLET ORAL EVERY 8 HOURS SCHEDULED
Qty: 17 TABLET | Refills: 0 | Status: SHIPPED | OUTPATIENT
Start: 2022-03-11 | End: 2022-03-17

## 2022-03-11 RX ADMIN — METOPROLOL TARTRATE 25 MG: 25 TABLET, FILM COATED ORAL at 08:08

## 2022-03-11 RX ADMIN — SODIUM CHLORIDE 2 G: 900 INJECTION INTRAVENOUS at 08:08

## 2022-03-11 RX ADMIN — HYDROCODONE BITARTRATE AND ACETAMINOPHEN 1 TABLET: 5; 325 TABLET ORAL at 04:11

## 2022-03-11 RX ADMIN — AMPICILLIN 2 G: 2 INJECTION, POWDER, FOR SOLUTION INTRAVENOUS at 04:11

## 2022-03-11 RX ADMIN — METRONIDAZOLE 250 MG: 500 TABLET ORAL at 06:42

## 2022-03-11 RX ADMIN — Medication 10 ML: at 08:08

## 2022-03-11 RX ADMIN — AMPICILLIN 2 G: 2 INJECTION, POWDER, FOR SOLUTION INTRAVENOUS at 08:08

## 2022-03-11 RX ADMIN — PREDNISONE 10 MG: 10 TABLET ORAL at 08:08

## 2022-03-11 RX ADMIN — ENOXAPARIN SODIUM 30 MG: 30 INJECTION SUBCUTANEOUS at 06:42

## 2022-03-11 RX ADMIN — PANTOPRAZOLE SODIUM 40 MG: 40 TABLET, DELAYED RELEASE ORAL at 06:42

## 2022-03-11 RX ADMIN — SUCRALFATE 1 G: 1 TABLET ORAL at 06:42

## 2022-03-11 NOTE — DISCHARGE SUMMARY
[facility] Medicine Services  DISCHARGE SUMMARY    Patient Name: Karen Larkin  : 1955  MRN: [id number]    Date of Admission: 3/5/2022  7:00 PM  Date of Discharge:  3/11/2022  Primary Care Physician: Margarita Mcgovern MD    Consults     Date and Time Order Name Status Description    3/7/2022  9:29 AM Inpatient Infectious Diseases Consult Completed     3/2/2022  9:19 AM Inpatient General Surgery Consult Completed     2022  7:21 AM Inpatient Gastroenterology Consult Completed           Hospital Course     Presenting Problem:   Multifocal pneumonia [J18.9]    Active Hospital Problems    Diagnosis  POA   • **Severe sepsis (HCC) [A41.9, R65.20]  Yes   • Elevated brain natriuretic peptide (BNP) level [R79.89]  Yes   • Elevated BUN [R79.9]  Yes   • Dehydration [E86.0]  Yes   • Elevated alkaline phosphatase level [R74.8]  Yes   • Serum total bilirubin elevated [R17]  Yes   • Leukopenia [D72.819]  Yes   • Aspiration pneumonia (HCC) [J69.0]  Yes   • Acute respiratory failure with hypoxia (HCC) [J96.01]  Yes   • PAF (paroxysmal atrial fibrillation) (HCC) [I48.0]  Yes   • Rheumatoid arthritis [M06.9]  Yes   • Hiatal hernia with gastroesophageal reflux [K21.9, K44.9]  Yes      Resolved Hospital Problems    Diagnosis Date Resolved POA   • Elevated lactic acid level [R79.89] 2022 Yes          Hospital Course:  66-year-old female with a past medical history that includes RA on chronic prednisone, PAF on Xarelto, hypertension, dyslipidemia, GERD, iron deficiency, status post recent bladder prolapse repair at [facility], and anemia.  She was at [facility] and was sent to our ER for shortness of breath and generalized weakness.  She has had several recent admissions including admission here recently for hematemesis in which she was found to have a large paraesophageal hernia as well as erosive gastric reflux disease.  She underwent CT of the chest which revealed no PE  but significant consolidation of both lower lobes with debris in the esophagus.  She was given vancomycin and Zosyn and sepsis bolus protocol.     Bacteremia  Suspected aspiration PNA  -She was admitted with bacteremia and suspected aspiration pneumonia. Cultures grew enterococcus. KHARI noted valvular abnormalities but no definitive endocarditis. Dr. Art has recommended treatment for 6 weeks for presumed endocarditis with ampicillin and rocephin as written. She will also complete flagyl for aspiration pneumonia. She is on room air     Large HH/reflux  -She will continue small meals and PPI therapy, with carafate.     RA on chronic steroids/immunosuppressed     Tachycardia/PAF  -We have resumed her BB. She will remain off anticoagulation given her recent GIB and has follow up with Dr. Jj, cardiology in April      Discharge Follow Up Recommendations for outpatient labs/diagnostics:  As written    Day of Discharge     HPI:   No complaints. Denies pain. No f/c. No dyspnea. Tolerating PO.    Review of Systems  As above    Vital Signs:   Temp:  [97.7 °F (36.5 °C)-97.8 °F (36.6 °C)] 97.7 °F (36.5 °C)  Heart Rate:  [] 107  Resp:  [18] 18  BP: (114-136)/(87-98) 136/98      Physical Exam:  NAD, alert and oriented  OP clear, MMM  Neck supple  No LAD  RRR  CTAB  +BS, ND, NT, soft  KAPOOR  Normal affect  B heels dressed    Pertinent  and/or Most Recent Results     LAB RESULTS:      Lab 03/10/22  0657 03/09/22  0335 03/08/22  0323 03/07/22  0432 03/07/22  0307 03/06/22  1257 03/05/22  1934   WBC 6.10 3.04* 2.76* 3.20* 2.66* 4.83 1.88*   HEMOGLOBIN 11.7* 9.5* 9.6* 10.3* 8.8* 11.8* 13.2   HEMATOCRIT 35.8 29.3* 30.6* 31.5* 28.2* 35.8 39.6   PLATELETS 191 146 150 185 152 232 264   NEUTROS ABS 4.94 2.39 2.32  --   --  4.50 1.54*   IMMATURE GRANS (ABS) 0.13* 0.05 0.05  --   --  0.02 0.01   LYMPHS ABS 0.47* 0.31* 0.14*  --   --  0.14* 0.12*   MONOS ABS 0.48 0.27 0.24  --   --  0.15 0.18   EOS ABS 0.06 0.02 0.00  --   --   0.00 0.02   MCV 90.2 93.3 95.0 90.5 96.6 89.7 89.2   PROCALCITONIN  --   --   --   --   --   --  1.88*   LACTATE  --   --   --   --   --  1.7 2.1*         Lab 03/10/22  0657 03/09/22  1539 03/09/22  0335 03/08/22  1109 03/08/22 0323 03/07/22  1515 03/07/22  0432 03/07/22  0307   SODIUM 138  --  143  --  143 144 142 145   POTASSIUM 3.6 4.2 3.5 4.4 3.3* 3.3* 2.9* 2.4*   CHLORIDE 105  --  113*  --  116* 113* 109* 116*   CO2 22.0  --  23.0  --  20.0* 20.0* 23.0 19.0*   ANION GAP 11.0  --  7.0  --  7.0 11.0 10.0 10.0   BUN 7*  --  11  --  14 20 23 19   CREATININE 0.29*  --  0.27*  --  0.32* 0.32* 0.38* 0.36*   EGFR 119.6  --  121.7  --  116.8 116.8 112.1 113.5   GLUCOSE 59*  --  83  --  290* 125* 142* 880*   CALCIUM 8.6  --  8.4*  --  7.9* 8.2* 8.5* 6.7*   MAGNESIUM  --   --   --   --  1.8  --  2.1 1.7   PHOSPHORUS  --   --  2.6 2.8 1.9* 2.9 1.7* 1.4*         Lab 03/09/22  0335 03/08/22 0323 03/07/22 1515 03/05/22 1934   TOTAL PROTEIN  --  4.8*  --  6.5   ALBUMIN 2.50* 2.50* 2.50* 3.50   GLOBULIN  --  2.3  --  3.0   ALT (SGPT)  --  10  --  8   AST (SGOT)  --  19  --  19   BILIRUBIN  --  0.6  --  2.0*   ALK PHOS  --  229*  --  245*         Lab 03/05/22  1934   PROBNP 2,577.0*   TROPONIN T 0.019                 Lab 03/05/22  2242   PH, ARTERIAL 7.450   PCO2, ARTERIAL 31.1*   PO2 ART 93.9   FIO2 80   HCO3 ART 21.6   BASE EXCESS ART -1.6*   CARBOXYHEMOGLOBIN 0.8     Brief Urine Lab Results  (Last result in the past 365 days)      Color   Clarity   Blood   Leuk Est   Nitrite   Protein   CREAT   Urine HCG        03/05/22 2145 Dark Yellow   Cloudy   Large (3+)   Small (1+)   Negative   30 mg/dL (1+)               Microbiology Results (last 10 days)     Procedure Component Value - Date/Time    COVID PRE-OP / PRE-PROCEDURE SCREENING ORDER (NO ISOLATION) - Swab, Nasopharynx [[id number]]  (Normal) Collected: 03/10/22 1456    Lab Status: Final result Specimen: Swab from Nasopharynx Updated: 03/10/22 1514    Narrative:      The  following orders were created for panel order COVID PRE-OP / PRE-PROCEDURE SCREENING ORDER (NO ISOLATION) - Swab, Nasopharynx.  Procedure                               Abnormality         Status                     ---------                               -----------         ------                     COVID-19, ABBOTT IN-HOUS...[[id number]]  Normal              Final result                 Please view results for these tests on the individual orders.    COVID-19, ABBOTT IN-HOUSE,NASAL Swab (NO TRANSPORT MEDIA) 2 HR TAT - Swab, Nasopharynx [[id number]]  (Normal) Collected: 03/10/22 1456    Lab Status: Final result Specimen: Swab from Nasopharynx Updated: 03/10/22 1514     COVID19 Presumptive Negative    Narrative:      Fact sheet for providers: https://www.fda.gov/media/637091/download     Fact sheet for patients: https://www.fda.gov/media/168323/download    Test performed by PCR.  If inconsistent with clinical signs and symptoms patient should be tested with different authorized molecular test.    Blood Culture - Blood, Hand, Left [[id number]]  (Normal) Collected: 03/09/22 1539    Lab Status: Preliminary result Specimen: Blood from Hand, Left Updated: 03/10/22 1647     Blood Culture No growth at 24 hours    Blood Culture - Blood, Arm, Left [[id number]]  (Normal) Collected: 03/09/22 1539    Lab Status: Preliminary result Specimen: Blood from Arm, Left Updated: 03/10/22 1647     Blood Culture No growth at 24 hours    Blood Culture - Blood, Blood, Central Line [[id number]]  (Normal) Collected: 03/08/22 1109    Lab Status: Preliminary result Specimen: Blood, Central Line Updated: 03/10/22 1147     Blood Culture No growth at 2 days    Urine Culture - Urine, Urine, Catheter [[id number]] Collected: 03/05/22 2145    Lab Status: Final result Specimen: Urine, Catheter Updated: 03/07/22 1503     Urine Culture 25,000 CFU/mL Mixed Lexis Isolated    Narrative:      Specimen contains mixed organisms of questionable pathogenicity which  indicates contamination with commensal arnaldo.  Further identification is unlikely to provide clinically useful information.  Suggest recollection.    COVID PRE-OP / PRE-PROCEDURE SCREENING ORDER (NO ISOLATION) - Swab, Nasopharynx [[id number]]  (Normal) Collected: 03/05/22 2008    Lab Status: Final result Specimen: Swab from Nasopharynx Updated: 03/05/22 2042    Narrative:      The following orders were created for panel order COVID PRE-OP / PRE-PROCEDURE SCREENING ORDER (NO ISOLATION) - Swab, Nasopharynx.  Procedure                               Abnormality         Status                     ---------                               -----------         ------                     COVID-19 and FLU A/B PCR...[[id number]]  Normal              Final result                 Please view results for these tests on the individual orders.    COVID-19 and FLU A/B PCR - Swab, Nasopharynx [[id number]]  (Normal) Collected: 03/05/22 2008    Lab Status: Final result Specimen: Swab from Nasopharynx Updated: 03/05/22 2042     COVID19 Not Detected     Influenza A PCR Not Detected     Influenza B PCR Not Detected    Narrative:      Fact sheet for providers: https://www.fda.gov/media/136449/download    Fact sheet for patients: https://www.fda.gov/media/089482/download    Test performed by PCR.    Blood Culture - Blood, Hand, Right [[id number]]  (Abnormal)  (Susceptibility) Collected: 03/05/22 2005    Lab Status: Final result Specimen: Blood from Hand, Right Updated: 03/08/22 0642     Blood Culture Enterococcus faecalis     Comment: Infectious disease consultation is highly recommended.        Isolated from Aerobic and Anaerobic Bottles     Gram Stain Aerobic Bottle Gram positive cocci in pairs and chains      Anaerobic Bottle Gram positive cocci in pairs and chains    Susceptibility      Enterococcus faecalis      GRUPO      Ampicillin Susceptible      Gentamicin High Level Synergy Susceptible      Vancomycin Susceptible                            Blood Culture ID, PCR - Blood, Hand, Right [[id number]]  (Abnormal) Collected: 03/05/22 2005    Lab Status: Final result Specimen: Blood from Hand, Right Updated: 03/06/22 1111     BCID, PCR Enterococcus faecalis. Ashley/B (vancomycin resistance gene) not detected. Identification by BCID2 PCR.    Narrative:      Infectious disease consultation is highly recommended to rule out distant foci of infection.    Blood Culture - Blood, Arm, Right [[id number]]  (Abnormal) Collected: 03/05/22 2000    Lab Status: Final result Specimen: Blood from Arm, Right Updated: 03/08/22 0644     Blood Culture Enterococcus faecalis     Comment: Infectious disease consultation is highly recommended.        Isolated from Aerobic and Anaerobic Bottles     Gram Stain Aerobic Bottle Gram positive cocci in pairs and chains      Anaerobic Bottle Gram positive cocci in pairs and chains    Narrative:      Refer to previous blood culture collected on 3/5/2022 at 2005 for MICs.          Adult Transesophageal Echo (KHARI) W/ Cont if Necessary Per Protocol    Result Date: 3/8/2022  · Estimated left ventricular EF = 56% Left ventricular ejection fraction appears to be 56 - 60%. Left ventricular systolic function is normal. · There is (grade 1) layered plaque in the descending aorta present. · Left ventricular wall thickness is consistent with moderate to severe basal asymmetric hypertrophy. · Estimated right ventricular systolic pressure from tricuspid regurgitation is normal (<35 mmHg). · The aortic valve exhibits mild sclerosis. · No evidence of pulmonary hypertension is present. · There is no evidence of pericardial effusion. · Technically difficult and limited study due to presumed large hiatal hernia with suboptimal windows for image acquisition without definitive findings suggestive of valvular endocarditis.      Adult Transthoracic Echo Complete W/ Cont if Necessary Per Protocol    Result Date: 3/7/2022  · Left ventricular wall thickness is  consistent with mild to moderate basal asymmetric hypertrophy. · Estimated left ventricular EF = 75% Left ventricular ejection fraction appears to be greater than 70%. Left ventricular systolic function is hyperdynamic (EF > 70%). · Moderate mitral valve regurgitation is present with an eccentric jet noted. · Mild aortic valve stenosis is present. · The right atrial cavity is mild to moderately dilated. · Estimated right ventricular systolic pressure from tricuspid regurgitation is mildly elevated (35-45 mmHg). Calculated right ventricular systolic pressure from tricuspid regurgitation is 40 mmHg. · Left atrial volume is severely increased. · Systolic anterior motion of the anterior mitral leaflet is present and a nonmeasured gradient is present at rest. · The findings are consistent with obstructive, hypertrophic cardiomyopathy. · Mild pulmonary hypertension is present. · There is a trivial circumferential pericardial effusion.      CT Abdomen Pelvis With & Without Contrast    Result Date: 3/8/2022  DATE OF EXAM: 3/7/2022 6:59 PM  PROCEDURE: CT ABDOMEN PELVIS W WO CONTRAST-  INDICATIONS: Sepsis; J18.9-Pneumonia, unspecified organism; I48.91-Unspecified atrial fibrillation; A41.9-Sepsis, unspecified organism; I95.9-Hypotension, unspecified; R09.02-Hypoxemia; R13.10-Dysphagia, unspecified  COMPARISON: CT abdomen and pelvis 3/3/2022  TECHNIQUE: Routine transaxial slices were obtained through the abdomen without the administration of intravenous contrast. Additional scanning through the abdomen and pelvis followed by the intravenous administration of 100 mL of Isovue 300. Positive oral contrast was administered. Reconstructed coronal and sagittal images were also obtained. Automated exposure control and iterative construction methods were used.  The radiation dose reduction device was turned on for each scan per the ALARA (As Low as Reasonably Achievable) protocol.  FINDINGS:  Worsened bibasilar infiltrates concerning  for pneumonia/aspiration. Partially imaged large hiatal hernia. Redemonstration of extensive cystic change of the liver and the lesser extent the kidneys. Some of the liver cyst demonstrate peripheral calcifications. Unchanged splenic cyst. Unremarkable appearance of the pancreas. The adrenal glands appear unremarkable. No hydronephrosis or nephrolithiasis. The urinary bladder is decompressed and not well evaluated. Redemonstration of numerous prominent mildly dilated loops of small bowel. Many of the loops in the lower abdomen and pelvis demonstrate marked wall thickening/submucosal edema. A discrete transition point is not confidently identified. Positive oral contrast is seen within the colon. A small amount of ascites. No pneumoperitoneum. There is mesenteric hyperemia. Ectatic abdominal aorta without aneurysm. Otherwise unremarkable appearance of the vasculature. The bones are severely demineralized. Severe degenerative change of the left hip. No acute osseous findings. Right hip cephalomedullary construct.       Worsened bibasilar infiltrates concerning for pneumonia/aspiration.  Numerous dilated loops of small bowel, many of which demonstrate prominent wall thickening/submucosal edema and mesenteric hyperemia. Infectious or inflammatory enteritis cannot be excluded. No definite small bowel obstruction with transition point. Positive oral contrast reaches the colon.  Small ascites may be related to the above.  Sequelae of polycystic kidney and liver disease.  Redemonstration of large hiatal hernia/intrathoracic stomach.  This report was finalized on 3/8/2022 12:32 AM by Tejas Shaver MD.      CT Abdomen Pelvis Without Contrast    Result Date: 3/3/2022  CT ABDOMEN PELVIS WO CONTRAST-  Date of Exam: 3/3/2022 8:24 AM  Indication: persistent N/V, known paraesophageal hernia; K92.2-Gastrointestinal hemorrhage, unspecified; K92.0-Hematemesis; R10.10-Upper abdominal pain, unspecified; Z79.01-Long term (current) use  of anticoagulants; K21.7-Qglifg-nxeqmeqmno reflux disease without esophagitis; K44.9-Diaphragmatic hernia without obstruction or gangrene.   Comparison Exams: CT abdomen and pelvis 02/24/2022  Technique: Axial images from the base of the chest through the abdomen and pelvis were obtained without intravenous contrast. Sagittal and coronal reformatted images also performed. Automated exposure control and iterative reconstruction methods were used.   FINDINGS:  Large paraesophageal hiatal hernia is unchanged. Unchanged small left pleural effusion with adjacent atelectasis.  There is new dilation of numerous small bowel loops, filled mostly with fluid with a small amount of air also present. There is apparent distal small bowel collapse. Normal caliber of the colon. Evaluation limited due to lack of contrast.  Small amount of ascites is present. No free air.  Extensive cysts throughout the kidneys and liver again noted some with partial calcification. No evidence of pancreatitis. No ureteral stone or hydronephrosis either side.  Normal appearance of the bladder.       New distal small bowel obstruction. The exact site of transition cannot be visualized due to crowding of structures and lack of contrast.  This report was finalized on 3/3/2022 9:57 AM by Escobar Mark.      XR Chest 1 View    Result Date: 3/8/2022   DATE OF EXAM: 3/8/2022 4:18 AM  PROCEDURE: XR CHEST 1 VW-  INDICATIONS: Follow-up pneumonia; J18.9-Pneumonia, unspecified organism; I48.91-Unspecified atrial fibrillation; A41.9-Sepsis, unspecified organism; I95.9-Hypotension, unspecified; R09.02-Hypoxemia; R13.10-Dysphagia, unspecified  COMPARISON: 03/07/2022  TECHNIQUE: Portable chest radiograph.  FINDINGS:  Esophagogastric tube stable in appearance within the patient's large hiatal hernia/intrathoracic stomach. There is a right upper extremity PICC line with the tip at the cavoatrial junction. Heart size is normal. Multifocal bilateral airspace disease most  concerning for multifocal pneumonia, stable. Small left pleural effusion. No pneumothorax.       1. No significant change in multifocal airspace disease throughout the lungs concerning for pneumonia. 2. Esophagogastric tube stable within the patient's large hiatal hernia/intrathoracic stomach. 3. Stable right upper extremity PICC line. No pneumothorax.  This report was finalized on 3/8/2022 7:17 AM by Alex Roe MD.      XR Chest 1 View    Result Date: 3/7/2022   DATE OF EXAM: 3/7/2022 4:10 AM  PROCEDURE: XR CHEST 1 VW-  INDICATIONS: f/u respiratory illness.; J18.9-Pneumonia, unspecified organism; I48.91-Unspecified atrial fibrillation; A41.9-Sepsis, unspecified organism; I95.9-Hypotension, unspecified; R09.02-Hypoxemia  COMPARISON: 03/06/2022  TECHNIQUE: Portable chest radiograph.  FINDINGS: Placement of an esophagogastric tube with the tip within the patient's large hiatal hernia/intrathoracic stomach. There is a right upper extremity PICC line with the tip at the low SVC. Multifocal patchy airspace disease throughout the lungs unchanged likely relating to pneumonia. No pneumothorax. No large effusion. Plate and screw fixation noted at the left proximal ureters.      1. Placement of esophagogastric tube within the patient's large hiatal hernia/intrathoracic stomach. 2. Stable right upper extremity PICC line. 3. Patchy multifocal airspace disease throughout the lungs concerning for pneumonia, stable. 4. No pneumothorax.  This report was finalized on 3/7/2022 7:05 AM by Alex Roe MD.      XR Chest 1 View    Result Date: 3/6/2022   DATE OF EXAM: 3/6/2022 11:16 AM  PROCEDURE: XR CHEST 1 VW-  INDICATIONS: picc placement; J18.9-Pneumonia, unspecified organism; I48.91-Unspecified atrial fibrillation; A41.9-Sepsis, unspecified organism; I95.9-Hypotension, unspecified; R09.02-Hypoxemia  COMPARISON: 03/05/2022 at 7:19 PM  TECHNIQUE: [Portable chest radiograph]  FINDINGS: Multifocal infiltrates are again seen throughout  the lungs bilaterally. These changes are stable. A large hiatal hernia is again noted. The cardiac silhouette and mediastinum are stable. A right PICC line is noted. The distal tip partially extends into the right atrium. Recommend retraction of the line by approximately 2 cm.      1.  The right PICC line distal tip partially extends into the right atrium. Recommend retraction of the line by approximately 2 cm. 2.  Stable multifocal airspace infiltrates bilaterally.  This report was finalized on 3/6/2022 11:20 AM by Kemar Armijo MD.      XR Chest 1 View    Result Date: 3/5/2022  DATE OF EXAM: 3/5/2022 7:20 PM  PROCEDURE: XR CHEST 1 VW-  INDICATIONS: SOA triage protocol  COMPARISON: 2/22/2022  TECHNIQUE: Single radiographic AP view of the chest was obtained.  FINDINGS: Low lung volumes. Large hiatal hernia which partially obscures the heart border. There is probable cardiomegaly. Central pulmonary vessels are prominent. There are perihilar airspace opacities bilaterally.      Low lung volumes. There is cardiomegaly with vascular congestion. There are bilateral perihilar airspace opacities that likely represent edema, less likely pneumonia  This report was finalized on 3/5/2022 7:51 PM by Terrell Marx.      CT Angiogram Chest    Result Date: 3/5/2022  CT ANGIOGRAM OF THE CHEST INDICATION: Hypotension, leukopenia, shortness of breath TECHNIQUE: CT scan of the chest was performed following the administration of 80 mL Isovue-370 intravenous contrast. Imaging was performed to optimize evaluation of the pulmonary arterial system. CT dose lowering techniques were used, to include: automated exposure control, adjustment for patient size, and / or use of iterative reconstruction. MIP images were also generated. COMPARISON: 2/24/2022 FINDINGS: Vasculature: There are no filling defects in the central, lobar, proximal segmental or distal segmental pulmonary arteries. There is no evidence of right heart strain or pulmonary  infarction. Coronary artery calcifications are present. The heart is enlarged. Mediastinum / Pleura: There is no pericardial or pleural effusion. There is no mediastinal, hilar or axillary lymphadenopathy. A large hiatal hernia is present, with intrathoracic stomach. The esophagus is filled with fluid along its length. Airways / Lungs: The central airways are patent.  There is no pneumothorax. There is near complete consolidation of both lower lobes. Patchy airspace disease is present within the aerated upper lobes and right middle lobe. Bones: No destructive osseous lesion is identified. A chronic L1 deformity is again seen. Upper Abdomen: Numerous cystic lesions are present in the liver. Calcified lesions are also seen. A cystic lesion is also present in the spleen.     1. No evidence of pulmonary embolism. 2. Patchy consolidative airspace disease in both lungs, with near complete consolidation of the lower lobes. Findings likely represent pneumonia. 3. Large hiatal hernia with intrathoracic stomach. The esophagus is also filled with fluid, suggesting gastroesophageal reflux. 4. Cardiomegaly and coronary artery atherosclerosis. 5. Chronic changes below the diaphragm involving the liver and spleen. Electronically signed by:  Tay Funez M.D.  3/5/2022 7:30 PM Mountain Time    XR Abdomen KUB    Result Date: 3/6/2022  DATE OF EXAM: 3/6/2022 1:14 PM  PROCEDURE: XR ABDOMEN KUB-  INDICATIONS: vomiting, tube placement; J18.9-Pneumonia, unspecified organism; I48.91-Unspecified atrial fibrillation; A41.9-Sepsis, unspecified organism; I95.9-Hypotension, unspecified; R09.02-Hypoxemia  COMPARISON: 3/2/2020  TECHNIQUE: Single radiographic view of the abdomen was obtained.  FINDINGS: There is persistent dilatation of bowel loops in the left abdomen and upper quadrant. No pneumatosis is identified. No gross evidence of free air is seen. There is excreted contrast material within the right renal collecting system and urinary  bladder. Atherosclerotic vascular calcification is noted diffusely. There is degenerative change in the spine. Severe degenerative change in the left hip. Right hip lag screw and IM orin incidentally noted..       1. Persistent nonspecific dilatation of bowel loops in the left upper quadrant and left abdomen. 2. There is excreted contrast material remaining in the right renal collecting system and bladder. This may indicate renal dysfunction given the length of time since patient's contrasted study 8:00 PM last night. Correlate clinically.  This report was finalized on 3/6/2022 1:44 PM by Brennen Garcia MD.      XR Abdomen KUB    Result Date: 3/2/2022  Examination: XR ABDOMEN KUB-  Date of Exam: 3/2/2022 6:40 AM  Indication: RRT; K92.2-Gastrointestinal hemorrhage, unspecified; K92.0-Hematemesis; R10.10-Upper abdominal pain, unspecified; Z79.01-Long term (current) use of anticoagulants; K21.2-Oomgya-bkbncvnxug reflux disease without esophagitis; K44.9-Diaphragmatic hernia without obstruction or gangrene.  Comparison: CT abdomen and pelvis with contrast 02/24/2022  Technique: Single AP view of the abdomen was obtained radiographically.  Findings: Lung bases are without consolidation. There is a large hiatal hernia partially imaged. Air-filled loops of small large bowel overlying the abdomen appears similar to recent abdominal CT could relate to mild ileus pattern. There has been prior intramedullary fixation of the right proximal femur. There is no gross pneumoperitoneum within limits of supine portable technique. Severe osteoarthritis at the left hip noted.      Mild gaseous distention of small and large bowel loops involving the left upper quadrant similar to recent abdominal CT which may relate to ileus type pattern. No gross pneumoperitoneum within limits of supine technique.  Additional incidental chronic findings above.  This report was finalized on 3/2/2022 7:26 AM by Alex Roe MD.                Results for  orders placed during the hospital encounter of 03/05/22    Adult Transesophageal Echo (KHARI) W/ Cont if Necessary Per Protocol    Interpretation Summary  · Estimated left ventricular EF = 56% Left ventricular ejection fraction appears to be 56 - 60%. Left ventricular systolic function is normal.  · There is (grade 1) layered plaque in the descending aorta present.  · Left ventricular wall thickness is consistent with moderate to severe basal asymmetric hypertrophy.  · Estimated right ventricular systolic pressure from tricuspid regurgitation is normal (<35 mmHg).  · The aortic valve exhibits mild sclerosis.  · No evidence of pulmonary hypertension is present.  · There is no evidence of pericardial effusion.  · Technically difficult and limited study due to presumed large hiatal hernia with suboptimal windows for image acquisition without definitive findings suggestive of valvular endocarditis.      Plan for Follow-up of Pending Labs/Results: Reviewed  Pending Labs     Order Current Status    Blood Culture - Blood, Arm, Left Preliminary result    Blood Culture - Blood, Blood, Central Line Preliminary result    Blood Culture - Blood, Hand, Left Preliminary result        Discharge Details        Discharge Medications      New Medications      Instructions Start Date   ampicillin 2 g in sodium chloride 0.9 % 100 mL IVPB   2 g, Intravenous, Every 4 Hours      atorvastatin 10 MG tablet  Commonly known as: Lipitor   10 mg, Oral, Daily      cefTRIAXone  Commonly known as: ROCEPHIN   2 g, Intravenous, Every 12 Hours      metroNIDAZOLE 250 MG tablet  Commonly known as: FLAGYL   250 mg, Oral, Every 8 Hours Scheduled         Changes to Medications      Instructions Start Date   bisacodyl 10 MG suppository  Commonly known as: DULCOLAX  What changed: Another medication with the same name was removed. Continue taking this medication, and follow the directions you see here.   10 mg, Rectal, Daily      HYDROcodone-acetaminophen 5-325  MG per tablet  Commonly known as: NORCO  What changed: when to take this   1 tablet, Oral, Every 6 Hours PRN         Continue These Medications      Instructions Start Date   ferrous sulfate 325 (65 FE) MG tablet   325 mg, Oral, 2 times daily      metoprolol tartrate 50 MG tablet  Commonly known as: LOPRESSOR   50 mg, Oral, Every 12 Hours Scheduled      pantoprazole 40 MG EC tablet  Commonly known as: Protonix   40 mg, Oral, 2 Times Daily      polyethylene glycol 17 g packet  Commonly known as: MIRALAX   17 g, Oral, Daily PRN      predniSONE 1 MG tablet  Commonly known as: DELTASONE   5 mg, Oral, Daily      saccharomyces boulardii 250 MG capsule  Commonly known as: FLORASTOR   250 mg, Oral, 2 Times Daily      sennosides-docusate 8.6-50 MG per tablet  Commonly known as: PERICOLACE   2 tablets, Oral, 2 Times Daily      sucralfate 1 g tablet  Commonly known as: CARAFATE   1 g, Oral, 4 Times Daily Before Meals & Nightly             No Known Allergies      Discharge Disposition:  Hospice/Medical Facility (DC - External)    Diet:  Hospital:  Diet Order   Procedures   • Diet Soft Texture; Chopped; Thin       Activity:  Activity Instructions     Activity as Tolerated            Restrictions or Other Recommendations:         CODE STATUS:    Code Status and Medical Interventions:   Ordered at: 03/05/22 8448     Code Status (Patient has no pulse and is not breathing):    CPR (Attempt to Resuscitate)     Medical Interventions (Patient has pulse or is breathing):    Full Support       Future Appointments   Date Time Provider Department Center   3/11/2022  9:45 AM [facility]       Additional Instructions for the Follow-ups that You Need to Schedule     Blood Gas, Arterial -With Co-Ox Panel: Yes    Mar 05, 2022 (Approximate)      Test ordered at [facility], [facility], [facility], [facility] or [facility]: No    With Co-Ox Panel: Yes    Release to patient: Immediate         Discharge Follow-up with PCP   As directed       Currently Documented PCP:     Margarita Mcgovern MD    PCP Phone Number:    [phone]     Follow Up Details: 1-2 weeks         Discharge Follow-up with Specified Provider: Dr. Jj, Cardiology in April as scheduled   As directed      To: Dr. Jj, Cardiology in April as scheduled         Discharge Follow-up with Specified Provider: Dr. Clovis RUELAS, 2-4 weeks   As directed      To: Dr. Clovis RUELAS, 2-4 weeks                     Yohannes Chun MD  03/11/22      Time Spent on Discharge:  I spent 40 minutes on this discharge activity which included: face-to-face encounter with the patient, reviewing the data in the system, coordination of the care with the nursing staff as well as consultants, documentation, and entering orders.

## 2022-03-11 NOTE — PROGRESS NOTES
INFECTIOUS DISEASE Progress Note    Karen Larkin  1955  [id number]    Admission Date: 3/5/2022      Requesting Provider: No ref. provider found  Evaluating Physician: Clovis Art MD    Reason for Consultation: Bacteremia     History of present illness:    3/7/22: Patient is a 66 y.o. female, with PMH RA on Prednisone, large HH, autoimmune heapatitis, GERD, VHD, afib  diastolic heart failure, PCKD, seen today for Enterococcal bacteremia.  She underwent recent bladder prolapse surgery at [facility], transferred to [facility] for rehab.  Readmitted here 2/24- 3/4 with upper GI bleed, undergoing EGD which revealed complicated reflux disease, reflux esophagitis, type IV paraesophageal hernia with 3/4 of her stomach in the intrathoracic cavity. Developed nausea, vomiting, hypotension, and was transferred back to [facility] and returned 3/5 with shortness of breath, hypotension.  CTA of chest with patchy consolidative airspace disease of both lungs, with complete consolidation lower lobes.  Tmax of 99.2. Admitting labs with PCT 1.8, lactate 2.1, WBC 1.8, Sc r 0.97, Alk [id number], UA with 6-12 WBC, UC no growth.  Blood cultures now positive for E. Faecalis, not VRE, and we were consulted for evaluation and treatment. Currently on Vancomycin and Zosyn.    Review of her records at [facility] revealed an echocardiogram demonstrating aortic and mitral valvular calcifications and significant mitral valve disease with MR.    3/8/22: She has remained afebrile.  Her O2 saturation is 91% on room air. 2 sets of blood cultures from 3/5 are growing Enterococcus faecalis sensitive to ampicillin. Her creatinine this morning is 0.32. Her white blood cell count is 2.76. Strongyloidiasis antibody is pending from 3/7. I ordered a KHARI earlier today.  This was performed and revealed no overt evidence of vegetation but this was a technically difficult study due to her hiatal hernia. Overall, she feels much better  today.    3/9/22:  She remains afebrile. She continues to have some chest discomfort. Small amount of bloody sputum with cough.  No nausea.  Talking to her  on the phone.  She denies nausea, vomiting, and diarrhea.  She has decreased dyspnea.       3/10/22: She remains afebrile.  She is now out of the ICU. Repeat blood cultures from 3/8 and 3/9 are pending. She feels better today.  She denies increased cough and sputum production.  She denies nausea, vomiting, and diarrhea.  She denies abdominal pain.    3/11/22: She is scheduled for transfer to [facility] this morning.  She has remained afebrile.  Blood cultures from 3/8 and 3/9 are no growth so far.  She feels much better.  She denies nausea, vomiting, and diarrhea.  She is tolerating the metronidazole.  She denies increased cough and sputum production.      Past Medical History:   Diagnosis Date   • Anemia    • Arthritis     Rheumatoid   • GERD (gastroesophageal reflux disease)    • Hiatal hernia    • Liver disease    • Murmur    • Rheumatoid arthritis (HCC)    • Varicose veins of both lower extremities        Past Surgical History:   Procedure Laterality Date   • ENDOSCOPY N/A 2/24/2022    Procedure: ESOPHAGOGASTRODUODENOSCOPY;  Surgeon: Brunner, Mark I, MD;  Location: [facility];  Service: Gastroenterology;  Laterality: N/A;   • HERNIA REPAIR     • HIP SURGERY Right    • SHOULDER SURGERY Left        Family History   Problem Relation Age of Onset   • Heart disease Mother    • Cancer Mother         Colon   • Stroke Mother    • Cancer Father         Esophageal   • Stroke Father    • Cancer Sister         Colon       Social History     Socioeconomic History   • Marital status:    Tobacco Use   • Smoking status: Never Smoker   • Smokeless tobacco: Never Used   Substance and Sexual Activity   • Alcohol use: Never   • Drug use: Never   • Sexual activity: Defer       No Known Allergies      Medication:    Current Facility-Administered  Medications:   •  ampicillin (OMNIPEN) 2 g in sodium chloride 0.9 % 100 mL IVPB-MBP, 2 g, Intravenous, Q4H, Clovis Art MD, 2 g at 03/11/22 0808  •  bisacodyl (DULCOLAX) EC tablet 5 mg, 5 mg, Oral, Daily PRN, Vikram Benitez MD  •  cefTRIAXone (ROCEPHIN) 2 g/100 mL 0.9% NS IVPB (MBP), 2 g, Intravenous, Q12H, Vikram Benitez MD, 2 g at 03/11/22 0808  •  enoxaparin (LOVENOX) syringe 30 mg, 30 mg, Subcutaneous, Q24H, Vikram Benitez MD, 30 mg at 03/11/22 0642  •  HYDROcodone-acetaminophen (NORCO) 5-325 MG per tablet 1 tablet, 1 tablet, Oral, Q6H PRN, Vikram Benitez MD, 1 tablet at 03/11/22 0411  •  melatonin tablet 5 mg, 5 mg, Oral, Nightly PRN, Vikram Benitez MD, 5 mg at 03/10/22 2004  •  metoclopramide (REGLAN) injection 5 mg, 5 mg, Intravenous, Q6H PRN, Vikram Benitez MD, 5 mg at 03/07/22 0820  •  metoprolol tartrate (LOPRESSOR) tablet 25 mg, 25 mg, Oral, Q12H, Yohannes Chun MD, 25 mg at 03/11/22 0808  •  metroNIDAZOLE (FLAGYL) tablet 250 mg, 250 mg, Oral, Q8H, Clovis Art MD, 250 mg at 03/11/22 0642  •  ondansetron (ZOFRAN) injection 4 mg, 4 mg, Intravenous, Q6H PRN, Vikram Benitez MD, 4 mg at 03/08/22 1001  •  pantoprazole (PROTONIX) EC tablet 40 mg, 40 mg, Oral, Q AM, Vikram Benitez MD, 40 mg at 03/11/22 0642  •  polyethylene glycol (MIRALAX) packet 17 g, 17 g, Oral, Daily PRN, Vikram Benitez MD, 17 g at 03/07/22 0820  •  potassium chloride 20 mEq in 50 mL IVPB, 20 mEq, Intravenous, Q1H PRN, Vikram Benitez MD, Last Rate: 50 mL/hr at 03/09/22 1112, 20 mEq at 03/09/22 1112  •  potassium phosphate 45 mmol in sodium chloride 0.9 % 250 mL infusion, 45 mmol, Intravenous, PRN **OR** potassium phosphate 30 mmol in sodium chloride 0.9 % 100 mL infusion, 30 mmol, Intravenous, PRN, Last Rate: 25 mL/hr at 03/07/22 0820, 30 mmol at 03/07/22 0820 **OR** potassium phosphate 15 mmol in sodium chloride 0.9 % 100 mL  infusion, 15 mmol, Intravenous, PRN, Last Rate: 50 mL/hr at 03/08/22 0438, 15 mmol at 03/08/22 0438 **OR** sodium phosphates 45 mmol in sodium chloride 0.9 % 250 mL IVPB, 45 mmol, Intravenous, PRN **OR** sodium phosphates 30 mmol in sodium chloride 0.9 % 100 mL IVPB, 30 mmol, Intravenous, PRN **OR** sodium phosphates 15 mmol in sodium chloride 0.9 % 100 mL IVPB, 15 mmol, Intravenous, PRN, Vikram Benitez MD  •  predniSONE (DELTASONE) tablet 10 mg, 10 mg, Oral, Daily With Breakfast, Vikram Benitez MD, 10 mg at 03/11/22 0808  •  sennosides-docusate (PERICOLACE) 8.6-50 MG per tablet 1 tablet, 1 tablet, Oral, BID, Vikram Benitez MD, 1 tablet at 03/10/22 2004  •  sodium chloride 0.9 % flush 10 mL, 10 mL, Intravenous, Q12H, Vikram Benitez MD, 10 mL at 03/10/22 2006  •  sodium chloride 0.9 % flush 10 mL, 10 mL, Intravenous, PRN, Vikram Benitez MD  •  sodium chloride 0.9 % flush 10 mL, 10 mL, Intravenous, Q12H, Vikram Benitez MD, 10 mL at 03/11/22 0808  •  sodium chloride 0.9 % flush 10 mL, 10 mL, Intravenous, PRN, Vikram Benitez MD  •  sodium chloride 0.9 % flush 20 mL, 20 mL, Intravenous, PRN, Vikram Benitez MD  •  sucralfate (CARAFATE) tablet 1 g, 1 g, Oral, 4x Daily AC & at Bedtime, Vikram Benitez MD, 1 g at 03/11/22 0642    Antibiotics:  Anti-Infectives (From admission, onward)    Ordered     Dose/Rate Route Frequency Start Stop    03/10/22 0736  metroNIDAZOLE (FLAGYL) tablet 250 mg        Ordering Provider: Clovis Art MD    250 mg Oral Every 8 Hours Scheduled 03/10/22 0830 03/17/22 0559    03/10/22 0741  ampicillin (OMNIPEN) 2 g in sodium chloride 0.9 % 100 mL IVPB-MBP        Ordering Provider: Clovis Art MD    2 g  200 mL/hr over 30 Minutes Intravenous Every 4 Hours 03/10/22 0830 04/16/22 0829    03/07/22 1422  cefTRIAXone (ROCEPHIN) 2 g/100 mL 0.9% NS IVPB (MBP)        Ordering Provider: Vikram Benitez  MD    2 g  over 30 Minutes Intravenous Every 12 Hours 22 1800 22 0859    22 1526  vancomycin in dextrose 5% 150 mL (VANCOCIN) IVPB 750 mg        Ordering Provider: Carmela Lizarraga PharmD    750 mg  over 60 Minutes Intravenous Once 22 1615 22 1701    22  piperacillin-tazobactam (ZOSYN) 3.375 g in iso-osmotic dextrose 50 ml (premix)        Ordering Provider: Grant Crouch PA    3.375 g  over 30 Minutes Intravenous Once 22 2151    22  vancomycin in dextrose 5% 150 mL (VANCOCIN) IVPB 750 mg        Ordering Provider: Grant Crouch PA    20 mg/kg × 42.6 kg Intravenous Once 22 2346            Review of Systems:  See HPI      Physical Exam:   Vital Signs  Temp (24hrs), Av.7 °F (36.5 °C), Min:97.7 °F (36.5 °C), Max:97.8 °F (36.6 °C)    Temp  Min: 97.7 °F (36.5 °C)  Max: 97.8 °F (36.6 °C)  BP  Min: 114/87  Max: 136/98  Pulse  Min: 80  Max: 116  Resp  Min: 18  Max: 18  SpO2  Min: 92 %  Max: 97 %    GENERAL: Awake and alert, in no acute distress.  She is frail and ill-appearing  HEENT: Normocephalic, atraumatic.  PERRL. EOMI. No conjunctival injection. No icterus. Oropharynx clear without evidence of thrush or exudate.   NECK: Supple   HEART: RRR; 3/6 murmur, rubs, gallops.   LUNGS: diminished   ABDOMEN: Soft, nontender, nondistended. No rebound or guarding.   EXT: She has multiple cyanotic digits on her hands and to a lesser extent on her feet.  RA deformities of joints   MSK:  No joint effusions   SKIN: Warm and dry numerous bruises upper extremities.  Left pretibial wound with scabbing.   NEURO: Oriented to PPT.motor strength 5/5  PSYCHIATRIC: Normal insight and judgement. Cooperative with PE  Right PICC: No erythema or drainage    Laboratory Data    Results from last 7 days   Lab Units 03/10/22  0657 22  0335 22  0323   WBC 10*3/mm3 6.10 3.04* 2.76*   HEMOGLOBIN g/dL 11.7* 9.5* 9.6*   HEMATOCRIT % 35.8  29.3* 30.6*   PLATELETS 10*3/mm3 191 146 150     Results from last 7 days   Lab Units 03/10/22  0657   SODIUM mmol/L 138   POTASSIUM mmol/L 3.6   CHLORIDE mmol/L 105   CO2 mmol/L 22.0   BUN mg/dL 7*   CREATININE mg/dL 0.29*   GLUCOSE mg/dL 59*   CALCIUM mg/dL 8.6     Results from last 7 days   Lab Units 03/08/22  0323   ALK PHOS U/L 229*   BILIRUBIN mg/dL 0.6   ALT (SGPT) U/L 10   AST (SGOT) U/L 19             Results from last 7 days   Lab Units 03/06/22  1257   LACTATE mmol/L 1.7         Results from last 7 days   Lab Units 03/07/22  1320   VANCOMYCIN RM mcg/mL 11.10     Estimated Creatinine Clearance: 153.1 mL/min (A) (by C-G formula based on SCr of 0.29 mg/dL (L)).      Microbiology:  Blood Culture   Date Value Ref Range Status   03/05/2022 Gram Positive Cocci (C)  Preliminary     BCID, PCR   Date Value Ref Range Status   03/05/2022 (A) Negative by BCID PCR. Culture to Follow. Final    Enterococcus faecalis. Ashley/B (vancomycin resistance gene) not detected. Identification by BCID2 PCR.     No results found for: CULTURES, HSVCX, URCX  No results found for: EYECULTURE, GCCX, HSVCULTURE, LABHSV  No results found for: LEGIONELLA, MRSACX, MUMPSCX, MYCOPLASCX  No results found for: NOCARDIACX, STOOLCX  Urine Culture   Date Value Ref Range Status   03/05/2022 No growth  Preliminary     No results found for: VIRALCULTU, WOUNDCX        Radiology:  Imaging Results (Last 72 Hours)     ** No results found for the last 72 hours. **      I read her radiographic studies      Echocardiogram 3/7/22:  Interpretation Summary    · Left ventricular wall thickness is consistent with mild to moderate basal asymmetric hypertrophy.  · Estimated left ventricular EF = 75% Left ventricular ejection fraction appears to be greater than 70%. Left ventricular systolic function is hyperdynamic (EF > 70%).  · Moderate mitral valve regurgitation is present with an eccentric jet noted.  · Mild aortic valve stenosis is present.  · The right atrial  cavity is mild to moderately dilated.  · Estimated right ventricular systolic pressure from tricuspid regurgitation is mildly elevated (35-45 mmHg). Calculated right ventricular systolic pressure from tricuspid regurgitation is 40 mmHg.  · Left atrial volume is severely increased.  · Systolic anterior motion of the anterior mitral leaflet is present and a nonmeasured gradient is present at rest.  · The findings are consistent with obstructive, hypertrophic cardiomyopathy.  · Mild pulmonary hypertension is present.  · There is a trivial circumferential pericardial effusion.     3/8/22:  KHARI    Interpretation Summary    · Estimated left ventricular EF = 56% Left ventricular ejection fraction appears to be 56 - 60%. Left ventricular systolic function is normal.  · There is (grade 1) layered plaque in the descending aorta present.  · Left ventricular wall thickness is consistent with moderate to severe basal asymmetric hypertrophy.  · Estimated right ventricular systolic pressure from tricuspid regurgitation is normal (<35 mmHg).  · The aortic valve exhibits mild sclerosis.  · No evidence of pulmonary hypertension is present.  · There is no evidence of pericardial effusion.  · Technically difficult and limited study due to presumed large hiatal hernia with suboptimal windows for image acquisition without definitive findings suggestive of valvular endocarditis.           Impression:   1.  Enterococcal bacteremia/? Endocarditis -I am concerned about enterococcal endocarditis since she has underlying significant valvular heart disease and since we do not have another obvious source for her enterococcal bacteremia.  She does not appear to have a urinary tract source, intra-abdominal source, or biliary tract source.  There is no evidence of strongyloides hyper-infection syndrome with a negative strongyloides antibody.  I think we will need to treat her for endocarditis.  She will need 6 weeks of combination intravenous  antibiotic therapy. She is now on high-dose intravenous ampicillin and ceftriaxone.  2.  Aspiration pneumonia-I will add oral metronidazole to her ampicillin/ceftriaxone therapy.  3.  Recent bladder prolapse surgery  4.  Large Hiatal hernia- with 3/4 of stomach in thoracic cavity   5.  Valvular heart disease-she has significant valvular heart disease with a loud murmur.  We will need to evaluate for endocarditis as noted above.  6.  Rheumatoid arthritis-on chronic prednisone therapy   7.  Polycystic kidney and liver disease  8.  Atrial fibrillation  9.  Leukopenia   10.  Immunocompromised host   11.  Pyuria/?UTI  12.  Acute hypoxic respiratory failure-improving    PLAN/RECOMMENDATIONS:  1.  Repeat blood culture-no growth so far  2.  Ceftriaxone 2 g IV every 12 hours-ending on 4/16  3.  Flagyl 250 mg by mouth 3 times a day  4.  Ampicillin 2 g IV every 4 hours-ending on 4/16  5.  Transfer to [facility]      I discussed her clinical situation and antibiotic therapy with clinical pharmacy at [facility] today.  I discussed her complex situation with Dr. Florez at [facility].  I discussed her situation with Dr. Chun today.  I coordinated her care.  I spent over 35 minutes on her care today.    Clovis Art MD  3/11/2022  08:37 EST

## 2022-03-11 NOTE — DISCHARGE PLACEMENT REQUEST
"Case Management  Rachel Littlejohn -[phone]      Karen Larkin (66 y.o. Female)             Date of Birth   1955    Social Security Number       Address   [address] [place] [address]    Home Phone   [phone]    MRN   [id number]       Moravian   Anglican    Marital Status                               Admission Date   3/5/22    Admission Type   Emergency    Admitting Provider   Yohannes Chun MD    Attending Provider   Yohannes Chun MD    Department, Room/Bed   [address], S457/1       Discharge Date       Discharge Disposition   Hospice/Medical Facility (DC - External)    Discharge Destination                               Attending Provider: Yohannes Chun MD    Allergies: No Known Allergies    Isolation: None   Infection: None   Code Status: CPR   Advance Care Planning Activity    Ht: 142.2 cm (56\")   Wt: 57.8 kg (127 lb 6.8 oz)    Admission Cmt: None   Principal Problem: Severe sepsis (HCC) [A41.9,R65.20]                 Active Insurance as of 3/5/2022     Primary Coverage     Payor Plan Insurance Group Employer/Plan Group    [employer] BLUE CROSS [employer] BLUE SHIELD O [id number]     Payor Plan Address Payor Plan Phone Number Payor Plan Fax Number Effective Dates    [address] [phone]  2015 - None Entered    [place] [address]       Subscriber Name Subscriber Birth Date Member ID       KAREN LARKIN 1955 [id number]                 Emergency Contacts      (Rel.) Home Phone Work Phone Mobile Phone    PATRICERICHIE CARRILLO (Spouse) [phone] -- [phone]    PATRICEMAO CARRILLO (Daughter) [phone] -- [phone]               Discharge Summary      Yohannes Chun MD at 22 0906              [facility] Medicine Services  DISCHARGE SUMMARY    Patient Name: Karen Larkin  : 1955  MRN: [id number]    Date of Admission: 3/5/2022  7:00 PM  Date of Discharge:  3/11/2022  Primary Care " Physician: Margarita Mcgovern MD    Consults     Date and Time Order Name Status Description    3/7/2022  9:29 AM Inpatient Infectious Diseases Consult Completed     3/2/2022  9:19 AM Inpatient General Surgery Consult Completed     2/24/2022  7:21 AM Inpatient Gastroenterology Consult Completed           Hospital Course     Presenting Problem:   Multifocal pneumonia [J18.9]    Active Hospital Problems    Diagnosis  POA   • **Severe sepsis (HCC) [A41.9, R65.20]  Yes   • Elevated brain natriuretic peptide (BNP) level [R79.89]  Yes   • Elevated BUN [R79.9]  Yes   • Dehydration [E86.0]  Yes   • Elevated alkaline phosphatase level [R74.8]  Yes   • Serum total bilirubin elevated [R17]  Yes   • Leukopenia [D72.819]  Yes   • Aspiration pneumonia (HCC) [J69.0]  Yes   • Acute respiratory failure with hypoxia (HCC) [J96.01]  Yes   • PAF (paroxysmal atrial fibrillation) (HCC) [I48.0]  Yes   • Rheumatoid arthritis [M06.9]  Yes   • Hiatal hernia with gastroesophageal reflux [K21.9, K44.9]  Yes      Resolved Hospital Problems    Diagnosis Date Resolved POA   • Elevated lactic acid level [R79.89] 03/07/2022 Yes          Hospital Course:  66-year-old female with a past medical history that includes RA on chronic prednisone, PAF on Xarelto, hypertension, dyslipidemia, GERD, iron deficiency, status post recent bladder prolapse repair at [facility], and anemia.  She was at [facility] and was sent to our ER for shortness of breath and generalized weakness.  She has had several recent admissions including admission here recently for hematemesis in which she was found to have a large paraesophageal hernia as well as erosive gastric reflux disease.  She underwent CT of the chest which revealed no PE but significant consolidation of both lower lobes with debris in the esophagus.  She was given vancomycin and Zosyn and sepsis bolus protocol.     Bacteremia  Suspected aspiration PNA  -She was admitted with bacteremia and  suspected aspiration pneumonia. Cultures grew enterococcus. KHARI noted valvular abnormalities but no definitive endocarditis. Dr. Art has recommended treatment for 6 weeks for presumed endocarditis with ampicillin and rocephin as written. She will also complete flagyl for aspiration pneumonia. She is on room air     Large HH/reflux  -She will continue small meals and PPI therapy, with carafate.     RA on chronic steroids/immunosuppressed     Tachycardia/PAF  -We have resumed her BB. She will remain off anticoagulation given her recent GIB and has follow up with Dr. Jj, cardiology in April      Discharge Follow Up Recommendations for outpatient labs/diagnostics:  As written    Day of Discharge     HPI:   No complaints. Denies pain. No f/c. No dyspnea. Tolerating PO.    Review of Systems  As above    Vital Signs:   Temp:  [97.7 °F (36.5 °C)-97.8 °F (36.6 °C)] 97.7 °F (36.5 °C)  Heart Rate:  [] 107  Resp:  [18] 18  BP: (114-136)/(87-98) 136/98      Physical Exam:  NAD, alert and oriented  OP clear, MMM  Neck supple  No LAD  RRR  CTAB  +BS, ND, NT, soft  KAPOOR  Normal affect  B heels dressed    Pertinent  and/or Most Recent Results     LAB RESULTS:      Lab 03/10/22  0657 03/09/22  0335 03/08/22  0323 03/07/22  0432 03/07/22  0307 03/06/22  1257 03/05/22  1934   WBC 6.10 3.04* 2.76* 3.20* 2.66* 4.83 1.88*   HEMOGLOBIN 11.7* 9.5* 9.6* 10.3* 8.8* 11.8* 13.2   HEMATOCRIT 35.8 29.3* 30.6* 31.5* 28.2* 35.8 39.6   PLATELETS 191 146 150 185 152 232 264   NEUTROS ABS 4.94 2.39 2.32  --   --  4.50 1.54*   IMMATURE GRANS (ABS) 0.13* 0.05 0.05  --   --  0.02 0.01   LYMPHS ABS 0.47* 0.31* 0.14*  --   --  0.14* 0.12*   MONOS ABS 0.48 0.27 0.24  --   --  0.15 0.18   EOS ABS 0.06 0.02 0.00  --   --  0.00 0.02   MCV 90.2 93.3 95.0 90.5 96.6 89.7 89.2   PROCALCITONIN  --   --   --   --   --   --  1.88*   LACTATE  --   --   --   --   --  1.7 2.1*         Lab 03/10/22  0657 03/09/22  1539 03/09/22  0335 03/08/22  1100  03/08/22 0323 03/07/22  1515 03/07/22  0432 03/07/22  0307   SODIUM 138  --  143  --  143 144 142 145   POTASSIUM 3.6 4.2 3.5 4.4 3.3* 3.3* 2.9* 2.4*   CHLORIDE 105  --  113*  --  116* 113* 109* 116*   CO2 22.0  --  23.0  --  20.0* 20.0* 23.0 19.0*   ANION GAP 11.0  --  7.0  --  7.0 11.0 10.0 10.0   BUN 7*  --  11  --  14 20 23 19   CREATININE 0.29*  --  0.27*  --  0.32* 0.32* 0.38* 0.36*   EGFR 119.6  --  121.7  --  116.8 116.8 112.1 113.5   GLUCOSE 59*  --  83  --  290* 125* 142* 880*   CALCIUM 8.6  --  8.4*  --  7.9* 8.2* 8.5* 6.7*   MAGNESIUM  --   --   --   --  1.8  --  2.1 1.7   PHOSPHORUS  --   --  2.6 2.8 1.9* 2.9 1.7* 1.4*         Lab 03/09/22  0335 03/08/22 0323 03/07/22  1515 03/05/22 1934   TOTAL PROTEIN  --  4.8*  --  6.5   ALBUMIN 2.50* 2.50* 2.50* 3.50   GLOBULIN  --  2.3  --  3.0   ALT (SGPT)  --  10  --  8   AST (SGOT)  --  19  --  19   BILIRUBIN  --  0.6  --  2.0*   ALK PHOS  --  229*  --  245*         Lab 03/05/22 1934   PROBNP 2,577.0*   TROPONIN T 0.019                 Lab 03/05/22 2242   PH, ARTERIAL 7.450   PCO2, ARTERIAL 31.1*   PO2 ART 93.9   FIO2 80   HCO3 ART 21.6   BASE EXCESS ART -1.6*   CARBOXYHEMOGLOBIN 0.8     Brief Urine Lab Results  (Last result in the past 365 days)      Color   Clarity   Blood   Leuk Est   Nitrite   Protein   CREAT   Urine HCG        03/05/22 2145 Dark Yellow   Cloudy   Large (3+)   Small (1+)   Negative   30 mg/dL (1+)               Microbiology Results (last 10 days)     Procedure Component Value - Date/Time    COVID PRE-OP / PRE-PROCEDURE SCREENING ORDER (NO ISOLATION) - Swab, Nasopharynx [[id number]]  (Normal) Collected: 03/10/22 1456    Lab Status: Final result Specimen: Swab from Nasopharynx Updated: 03/10/22 9819    Narrative:      The following orders were created for panel order COVID PRE-OP / PRE-PROCEDURE SCREENING ORDER (NO ISOLATION) - Swab, Nasopharynx.  Procedure                               Abnormality         Status                      ---------                               -----------         ------                     COVID-19, ABBOTT IN-HOUS...[[id number]]  Normal              Final result                 Please view results for these tests on the individual orders.    COVID-19, ABBOTT IN-HOUSE,NASAL Swab (NO TRANSPORT MEDIA) 2 HR TAT - Swab, Nasopharynx [[id number]]  (Normal) Collected: 03/10/22 1456    Lab Status: Final result Specimen: Swab from Nasopharynx Updated: 03/10/22 1514     COVID19 Presumptive Negative    Narrative:      Fact sheet for providers: https://www.fda.gov/media/369767/download     Fact sheet for patients: https://www.fda.gov/media/214832/download    Test performed by PCR.  If inconsistent with clinical signs and symptoms patient should be tested with different authorized molecular test.    Blood Culture - Blood, Hand, Left [[id number]]  (Normal) Collected: 03/09/22 1539    Lab Status: Preliminary result Specimen: Blood from Hand, Left Updated: 03/10/22 1647     Blood Culture No growth at 24 hours    Blood Culture - Blood, Arm, Left [[id number]]  (Normal) Collected: 03/09/22 1539    Lab Status: Preliminary result Specimen: Blood from Arm, Left Updated: 03/10/22 1647     Blood Culture No growth at 24 hours    Blood Culture - Blood, Blood, Central Line [[id number]]  (Normal) Collected: 03/08/22 1109    Lab Status: Preliminary result Specimen: Blood, Central Line Updated: 03/10/22 1147     Blood Culture No growth at 2 days    Urine Culture - Urine, Urine, Catheter [[id number]] Collected: 03/05/22 2145    Lab Status: Final result Specimen: Urine, Catheter Updated: 03/07/22 1503     Urine Culture 25,000 CFU/mL Mixed Arnaldo Isolated    Narrative:      Specimen contains mixed organisms of questionable pathogenicity which indicates contamination with commensal arnaldo.  Further identification is unlikely to provide clinically useful information.  Suggest recollection.    COVID PRE-OP / PRE-PROCEDURE SCREENING ORDER (NO ISOLATION) -  Swab, Nasopharynx [[id number]]  (Normal) Collected: 03/05/22 2008    Lab Status: Final result Specimen: Swab from Nasopharynx Updated: 03/05/22 2042    Narrative:      The following orders were created for panel order COVID PRE-OP / PRE-PROCEDURE SCREENING ORDER (NO ISOLATION) - Swab, Nasopharynx.  Procedure                               Abnormality         Status                     ---------                               -----------         ------                     COVID-19 and FLU A/B PCR...[[id number]]  Normal              Final result                 Please view results for these tests on the individual orders.    COVID-19 and FLU A/B PCR - Swab, Nasopharynx [[id number]]  (Normal) Collected: 03/05/22 2008    Lab Status: Final result Specimen: Swab from Nasopharynx Updated: 03/05/22 2042     COVID19 Not Detected     Influenza A PCR Not Detected     Influenza B PCR Not Detected    Narrative:      Fact sheet for providers: https://www.fda.gov/media/456567/download    Fact sheet for patients: https://www.fda.gov/media/504728/download    Test performed by PCR.    Blood Culture - Blood, Hand, Right [[id number]]  (Abnormal)  (Susceptibility) Collected: 03/05/22 2005    Lab Status: Final result Specimen: Blood from Hand, Right Updated: 03/08/22 0642     Blood Culture Enterococcus faecalis     Comment: Infectious disease consultation is highly recommended.        Isolated from Aerobic and Anaerobic Bottles     Gram Stain Aerobic Bottle Gram positive cocci in pairs and chains      Anaerobic Bottle Gram positive cocci in pairs and chains    Susceptibility      Enterococcus faecalis      GRUPO      Ampicillin Susceptible      Gentamicin High Level Synergy Susceptible      Vancomycin Susceptible                           Blood Culture ID, PCR - Blood, Hand, Right [[id number]]  (Abnormal) Collected: 03/05/22 2005    Lab Status: Final result Specimen: Blood from Hand, Right Updated: 03/06/22 1111     BCID, PCR Enterococcus  faecalis. Ashley/B (vancomycin resistance gene) not detected. Identification by BCID2 PCR.    Narrative:      Infectious disease consultation is highly recommended to rule out distant foci of infection.    Blood Culture - Blood, Arm, Right [[id number]]  (Abnormal) Collected: 03/05/22 2000    Lab Status: Final result Specimen: Blood from Arm, Right Updated: 03/08/22 0644     Blood Culture Enterococcus faecalis     Comment: Infectious disease consultation is highly recommended.        Isolated from Aerobic and Anaerobic Bottles     Gram Stain Aerobic Bottle Gram positive cocci in pairs and chains      Anaerobic Bottle Gram positive cocci in pairs and chains    Narrative:      Refer to previous blood culture collected on 3/5/2022 at 2005 for MICs.          Adult Transesophageal Echo (KHARI) W/ Cont if Necessary Per Protocol    Result Date: 3/8/2022  · Estimated left ventricular EF = 56% Left ventricular ejection fraction appears to be 56 - 60%. Left ventricular systolic function is normal. · There is (grade 1) layered plaque in the descending aorta present. · Left ventricular wall thickness is consistent with moderate to severe basal asymmetric hypertrophy. · Estimated right ventricular systolic pressure from tricuspid regurgitation is normal (<35 mmHg). · The aortic valve exhibits mild sclerosis. · No evidence of pulmonary hypertension is present. · There is no evidence of pericardial effusion. · Technically difficult and limited study due to presumed large hiatal hernia with suboptimal windows for image acquisition without definitive findings suggestive of valvular endocarditis.      Adult Transthoracic Echo Complete W/ Cont if Necessary Per Protocol    Result Date: 3/7/2022  · Left ventricular wall thickness is consistent with mild to moderate basal asymmetric hypertrophy. · Estimated left ventricular EF = 75% Left ventricular ejection fraction appears to be greater than 70%. Left ventricular systolic function is  hyperdynamic (EF > 70%). · Moderate mitral valve regurgitation is present with an eccentric jet noted. · Mild aortic valve stenosis is present. · The right atrial cavity is mild to moderately dilated. · Estimated right ventricular systolic pressure from tricuspid regurgitation is mildly elevated (35-45 mmHg). Calculated right ventricular systolic pressure from tricuspid regurgitation is 40 mmHg. · Left atrial volume is severely increased. · Systolic anterior motion of the anterior mitral leaflet is present and a nonmeasured gradient is present at rest. · The findings are consistent with obstructive, hypertrophic cardiomyopathy. · Mild pulmonary hypertension is present. · There is a trivial circumferential pericardial effusion.      CT Abdomen Pelvis With & Without Contrast    Result Date: 3/8/2022  DATE OF EXAM: 3/7/2022 6:59 PM  PROCEDURE: CT ABDOMEN PELVIS W WO CONTRAST-  INDICATIONS: Sepsis; J18.9-Pneumonia, unspecified organism; I48.91-Unspecified atrial fibrillation; A41.9-Sepsis, unspecified organism; I95.9-Hypotension, unspecified; R09.02-Hypoxemia; R13.10-Dysphagia, unspecified  COMPARISON: CT abdomen and pelvis 3/3/2022  TECHNIQUE: Routine transaxial slices were obtained through the abdomen without the administration of intravenous contrast. Additional scanning through the abdomen and pelvis followed by the intravenous administration of 100 mL of Isovue 300. Positive oral contrast was administered. Reconstructed coronal and sagittal images were also obtained. Automated exposure control and iterative construction methods were used.  The radiation dose reduction device was turned on for each scan per the ALARA (As Low as Reasonably Achievable) protocol.  FINDINGS:  Worsened bibasilar infiltrates concerning for pneumonia/aspiration. Partially imaged large hiatal hernia. Redemonstration of extensive cystic change of the liver and the lesser extent the kidneys. Some of the liver cyst demonstrate peripheral  calcifications. Unchanged splenic cyst. Unremarkable appearance of the pancreas. The adrenal glands appear unremarkable. No hydronephrosis or nephrolithiasis. The urinary bladder is decompressed and not well evaluated. Redemonstration of numerous prominent mildly dilated loops of small bowel. Many of the loops in the lower abdomen and pelvis demonstrate marked wall thickening/submucosal edema. A discrete transition point is not confidently identified. Positive oral contrast is seen within the colon. A small amount of ascites. No pneumoperitoneum. There is mesenteric hyperemia. Ectatic abdominal aorta without aneurysm. Otherwise unremarkable appearance of the vasculature. The bones are severely demineralized. Severe degenerative change of the left hip. No acute osseous findings. Right hip cephalomedullary construct.       Worsened bibasilar infiltrates concerning for pneumonia/aspiration.  Numerous dilated loops of small bowel, many of which demonstrate prominent wall thickening/submucosal edema and mesenteric hyperemia. Infectious or inflammatory enteritis cannot be excluded. No definite small bowel obstruction with transition point. Positive oral contrast reaches the colon.  Small ascites may be related to the above.  Sequelae of polycystic kidney and liver disease.  Redemonstration of large hiatal hernia/intrathoracic stomach.  This report was finalized on 3/8/2022 12:32 AM by Tejas Shaver MD.      CT Abdomen Pelvis Without Contrast    Result Date: 3/3/2022  CT ABDOMEN PELVIS WO CONTRAST-  Date of Exam: 3/3/2022 8:24 AM  Indication: persistent N/V, known paraesophageal hernia; K92.2-Gastrointestinal hemorrhage, unspecified; K92.0-Hematemesis; R10.10-Upper abdominal pain, unspecified; Z79.01-Long term (current) use of anticoagulants; K21.4-Fxgcac-jrftugenkt reflux disease without esophagitis; K44.9-Diaphragmatic hernia without obstruction or gangrene.   Comparison Exams: CT abdomen and pelvis 02/24/2022   Technique: Axial images from the base of the chest through the abdomen and pelvis were obtained without intravenous contrast. Sagittal and coronal reformatted images also performed. Automated exposure control and iterative reconstruction methods were used.   FINDINGS:  Large paraesophageal hiatal hernia is unchanged. Unchanged small left pleural effusion with adjacent atelectasis.  There is new dilation of numerous small bowel loops, filled mostly with fluid with a small amount of air also present. There is apparent distal small bowel collapse. Normal caliber of the colon. Evaluation limited due to lack of contrast.  Small amount of ascites is present. No free air.  Extensive cysts throughout the kidneys and liver again noted some with partial calcification. No evidence of pancreatitis. No ureteral stone or hydronephrosis either side.  Normal appearance of the bladder.       New distal small bowel obstruction. The exact site of transition cannot be visualized due to crowding of structures and lack of contrast.  This report was finalized on 3/3/2022 9:57 AM by Escobar Mark.      XR Chest 1 View    Result Date: 3/8/2022   DATE OF EXAM: 3/8/2022 4:18 AM  PROCEDURE: XR CHEST 1 VW-  INDICATIONS: Follow-up pneumonia; J18.9-Pneumonia, unspecified organism; I48.91-Unspecified atrial fibrillation; A41.9-Sepsis, unspecified organism; I95.9-Hypotension, unspecified; R09.02-Hypoxemia; R13.10-Dysphagia, unspecified  COMPARISON: 03/07/2022  TECHNIQUE: Portable chest radiograph.  FINDINGS:  Esophagogastric tube stable in appearance within the patient's large hiatal hernia/intrathoracic stomach. There is a right upper extremity PICC line with the tip at the cavoatrial junction. Heart size is normal. Multifocal bilateral airspace disease most concerning for multifocal pneumonia, stable. Small left pleural effusion. No pneumothorax.       1. No significant change in multifocal airspace disease throughout the lungs concerning for  pneumonia. 2. Esophagogastric tube stable within the patient's large hiatal hernia/intrathoracic stomach. 3. Stable right upper extremity PICC line. No pneumothorax.  This report was finalized on 3/8/2022 7:17 AM by Alex Roe MD.      XR Chest 1 View    Result Date: 3/7/2022   DATE OF EXAM: 3/7/2022 4:10 AM  PROCEDURE: XR CHEST 1 VW-  INDICATIONS: f/u respiratory illness.; J18.9-Pneumonia, unspecified organism; I48.91-Unspecified atrial fibrillation; A41.9-Sepsis, unspecified organism; I95.9-Hypotension, unspecified; R09.02-Hypoxemia  COMPARISON: 03/06/2022  TECHNIQUE: Portable chest radiograph.  FINDINGS: Placement of an esophagogastric tube with the tip within the patient's large hiatal hernia/intrathoracic stomach. There is a right upper extremity PICC line with the tip at the low SVC. Multifocal patchy airspace disease throughout the lungs unchanged likely relating to pneumonia. No pneumothorax. No large effusion. Plate and screw fixation noted at the left proximal ureters.      1. Placement of esophagogastric tube within the patient's large hiatal hernia/intrathoracic stomach. 2. Stable right upper extremity PICC line. 3. Patchy multifocal airspace disease throughout the lungs concerning for pneumonia, stable. 4. No pneumothorax.  This report was finalized on 3/7/2022 7:05 AM by Alex Roe MD.      XR Chest 1 View    Result Date: 3/6/2022   DATE OF EXAM: 3/6/2022 11:16 AM  PROCEDURE: XR CHEST 1 VW-  INDICATIONS: picc placement; J18.9-Pneumonia, unspecified organism; I48.91-Unspecified atrial fibrillation; A41.9-Sepsis, unspecified organism; I95.9-Hypotension, unspecified; R09.02-Hypoxemia  COMPARISON: 03/05/2022 at 7:19 PM  TECHNIQUE: [Portable chest radiograph]  FINDINGS: Multifocal infiltrates are again seen throughout the lungs bilaterally. These changes are stable. A large hiatal hernia is again noted. The cardiac silhouette and mediastinum are stable. A right PICC line is noted. The distal tip  partially extends into the right atrium. Recommend retraction of the line by approximately 2 cm.      1.  The right PICC line distal tip partially extends into the right atrium. Recommend retraction of the line by approximately 2 cm. 2.  Stable multifocal airspace infiltrates bilaterally.  This report was finalized on 3/6/2022 11:20 AM by Kemar Armijo MD.      XR Chest 1 View    Result Date: 3/5/2022  DATE OF EXAM: 3/5/2022 7:20 PM  PROCEDURE: XR CHEST 1 VW-  INDICATIONS: SOA triage protocol  COMPARISON: 2/22/2022  TECHNIQUE: Single radiographic AP view of the chest was obtained.  FINDINGS: Low lung volumes. Large hiatal hernia which partially obscures the heart border. There is probable cardiomegaly. Central pulmonary vessels are prominent. There are perihilar airspace opacities bilaterally.      Low lung volumes. There is cardiomegaly with vascular congestion. There are bilateral perihilar airspace opacities that likely represent edema, less likely pneumonia  This report was finalized on 3/5/2022 7:51 PM by Terrell Marx.      CT Angiogram Chest    Result Date: 3/5/2022  CT ANGIOGRAM OF THE CHEST INDICATION: Hypotension, leukopenia, shortness of breath TECHNIQUE: CT scan of the chest was performed following the administration of 80 mL Isovue-370 intravenous contrast. Imaging was performed to optimize evaluation of the pulmonary arterial system. CT dose lowering techniques were used, to include: automated exposure control, adjustment for patient size, and / or use of iterative reconstruction. MIP images were also generated. COMPARISON: 2/24/2022 FINDINGS: Vasculature: There are no filling defects in the central, lobar, proximal segmental or distal segmental pulmonary arteries. There is no evidence of right heart strain or pulmonary infarction. Coronary artery calcifications are present. The heart is enlarged. Mediastinum / Pleura: There is no pericardial or pleural effusion. There is no mediastinal, hilar or  axillary lymphadenopathy. A large hiatal hernia is present, with intrathoracic stomach. The esophagus is filled with fluid along its length. Airways / Lungs: The central airways are patent.  There is no pneumothorax. There is near complete consolidation of both lower lobes. Patchy airspace disease is present within the aerated upper lobes and right middle lobe. Bones: No destructive osseous lesion is identified. A chronic L1 deformity is again seen. Upper Abdomen: Numerous cystic lesions are present in the liver. Calcified lesions are also seen. A cystic lesion is also present in the spleen.     1. No evidence of pulmonary embolism. 2. Patchy consolidative airspace disease in both lungs, with near complete consolidation of the lower lobes. Findings likely represent pneumonia. 3. Large hiatal hernia with intrathoracic stomach. The esophagus is also filled with fluid, suggesting gastroesophageal reflux. 4. Cardiomegaly and coronary artery atherosclerosis. 5. Chronic changes below the diaphragm involving the liver and spleen. Electronically signed by:  Tay Funez M.D.  3/5/2022 7:30 PM Mountain Time    XR Abdomen KUB    Result Date: 3/6/2022  DATE OF EXAM: 3/6/2022 1:14 PM  PROCEDURE: XR ABDOMEN KUB-  INDICATIONS: vomiting, tube placement; J18.9-Pneumonia, unspecified organism; I48.91-Unspecified atrial fibrillation; A41.9-Sepsis, unspecified organism; I95.9-Hypotension, unspecified; R09.02-Hypoxemia  COMPARISON: 3/2/2020  TECHNIQUE: Single radiographic view of the abdomen was obtained.  FINDINGS: There is persistent dilatation of bowel loops in the left abdomen and upper quadrant. No pneumatosis is identified. No gross evidence of free air is seen. There is excreted contrast material within the right renal collecting system and urinary bladder. Atherosclerotic vascular calcification is noted diffusely. There is degenerative change in the spine. Severe degenerative change in the left hip. Right hip lag screw and IM  orin incidentally noted..       1. Persistent nonspecific dilatation of bowel loops in the left upper quadrant and left abdomen. 2. There is excreted contrast material remaining in the right renal collecting system and bladder. This may indicate renal dysfunction given the length of time since patient's contrasted study 8:00 PM last night. Correlate clinically.  This report was finalized on 3/6/2022 1:44 PM by Brennen Garcia MD.      XR Abdomen KUB    Result Date: 3/2/2022  Examination: XR ABDOMEN KUB-  Date of Exam: 3/2/2022 6:40 AM  Indication: RRT; K92.2-Gastrointestinal hemorrhage, unspecified; K92.0-Hematemesis; R10.10-Upper abdominal pain, unspecified; Z79.01-Long term (current) use of anticoagulants; K21.9-Rvhoqy-puqjfphdqv reflux disease without esophagitis; K44.9-Diaphragmatic hernia without obstruction or gangrene.  Comparison: CT abdomen and pelvis with contrast 02/24/2022  Technique: Single AP view of the abdomen was obtained radiographically.  Findings: Lung bases are without consolidation. There is a large hiatal hernia partially imaged. Air-filled loops of small large bowel overlying the abdomen appears similar to recent abdominal CT could relate to mild ileus pattern. There has been prior intramedullary fixation of the right proximal femur. There is no gross pneumoperitoneum within limits of supine portable technique. Severe osteoarthritis at the left hip noted.      Mild gaseous distention of small and large bowel loops involving the left upper quadrant similar to recent abdominal CT which may relate to ileus type pattern. No gross pneumoperitoneum within limits of supine technique.  Additional incidental chronic findings above.  This report was finalized on 3/2/2022 7:26 AM by Alex Roe MD.                Results for orders placed during the hospital encounter of 03/05/22    Adult Transesophageal Echo ([facility]) W/ Cont if Necessary Per Protocol    Interpretation Summary  · Estimated left ventricular EF  = 56% Left ventricular ejection fraction appears to be 56 - 60%. Left ventricular systolic function is normal.  · There is (grade 1) layered plaque in the descending aorta present.  · Left ventricular wall thickness is consistent with moderate to severe basal asymmetric hypertrophy.  · Estimated right ventricular systolic pressure from tricuspid regurgitation is normal (<35 mmHg).  · The aortic valve exhibits mild sclerosis.  · No evidence of pulmonary hypertension is present.  · There is no evidence of pericardial effusion.  · Technically difficult and limited study due to presumed large hiatal hernia with suboptimal windows for image acquisition without definitive findings suggestive of valvular endocarditis.      Plan for Follow-up of Pending Labs/Results: Reviewed  Pending Labs     Order Current Status    Blood Culture - Blood, Arm, Left Preliminary result    Blood Culture - Blood, Blood, Central Line Preliminary result    Blood Culture - Blood, Hand, Left Preliminary result        Discharge Details        Discharge Medications      New Medications      Instructions Start Date   ampicillin 2 g in sodium chloride 0.9 % 100 mL IVPB   2 g, Intravenous, Every 4 Hours      atorvastatin 10 MG tablet  Commonly known as: Lipitor   10 mg, Oral, Daily      cefTRIAXone  Commonly known as: ROCEPHIN   2 g, Intravenous, Every 12 Hours      metroNIDAZOLE 250 MG tablet  Commonly known as: FLAGYL   250 mg, Oral, Every 8 Hours Scheduled         Changes to Medications      Instructions Start Date   bisacodyl 10 MG suppository  Commonly known as: DULCOLAX  What changed: Another medication with the same name was removed. Continue taking this medication, and follow the directions you see here.   10 mg, Rectal, Daily      HYDROcodone-acetaminophen 5-325 MG per tablet  Commonly known as: NORCO  What changed: when to take this   1 tablet, Oral, Every 6 Hours PRN         Continue These Medications      Instructions Start Date   ferrous  sulfate 325 (65 FE) MG tablet   325 mg, Oral, 2 times daily      metoprolol tartrate 50 MG tablet  Commonly known as: LOPRESSOR   50 mg, Oral, Every 12 Hours Scheduled      pantoprazole 40 MG EC tablet  Commonly known as: Protonix   40 mg, Oral, 2 Times Daily      polyethylene glycol 17 g packet  Commonly known as: MIRALAX   17 g, Oral, Daily PRN      predniSONE 1 MG tablet  Commonly known as: DELTASONE   5 mg, Oral, Daily      saccharomyces boulardii 250 MG capsule  Commonly known as: FLORASTOR   250 mg, Oral, 2 Times Daily      sennosides-docusate 8.6-50 MG per tablet  Commonly known as: PERICOLACE   2 tablets, Oral, 2 Times Daily      sucralfate 1 g tablet  Commonly known as: CARAFATE   1 g, Oral, 4 Times Daily Before Meals & Nightly             No Known Allergies      Discharge Disposition:  Hospice/Medical Facility (DC - External)    Diet:  Hospital:  Diet Order   Procedures   • Diet Soft Texture; Chopped; Thin       Activity:  Activity Instructions     Activity as Tolerated            Restrictions or Other Recommendations:         CODE STATUS:    Code Status and Medical Interventions:   Ordered at: 03/05/22 2038     Code Status (Patient has no pulse and is not breathing):    CPR (Attempt to Resuscitate)     Medical Interventions (Patient has pulse or is breathing):    Full Support       Future Appointments   Date Time Provider Department Center   3/11/2022  9:45 AM [facility]       Additional Instructions for the Follow-ups that You Need to Schedule     Blood Gas, Arterial -With Co-Ox Panel: Yes    Mar 05, 2022 (Approximate)      Test ordered at [facility], [facility], [facility], [facility] or [facility]: No    With Co-Ox Panel: Yes    Release to patient: Immediate         Discharge Follow-up with PCP   As directed       Currently Documented PCP:    Margarita Mcgovern MD    PCP Phone Number:    [phone]     Follow Up Details: 1-2 weeks         Discharge Follow-up with Specified Provider: Dr. Jj, Cardiology in April as  scheduled   As directed      To: Dr. Jj, Cardiology in April as scheduled         Discharge Follow-up with Specified Provider: Dr. Clovis RUELAS, 2-4 weeks   As directed      To: Dr. Clovis RUELAS, 2-4 weeks                     Yohannes Chun MD  03/11/22      Time Spent on Discharge:  I spent 40 minutes on this discharge activity which included: face-to-face encounter with the patient, reviewing the data in the system, coordination of the care with the nursing staff as well as consultants, documentation, and entering orders.            Electronically signed by Yohannes Chun MD at 03/11/22 0909

## 2022-03-13 LAB — BACTERIA SPEC AEROBE CULT: NORMAL

## 2022-03-14 LAB
BACTERIA SPEC AEROBE CULT: NORMAL
BACTERIA SPEC AEROBE CULT: NORMAL

## (undated) DEVICE — SOL IRR H2O BTL 1000ML STRL

## (undated) DEVICE — INTRO ACCSR BLNT TP

## (undated) DEVICE — HYBRID CO2 TUBING/CAP SET FOR OLYMPUS® SCOPES & CO2 SOURCE: Brand: ERBE

## (undated) DEVICE — KT ORCA ORCAPOD DISP STRL

## (undated) DEVICE — THE BITE BLOCK MAXI, LATEX FREE STRAP IS USED TO PROTECT THE ENDOSCOPE INSERTION TUBE FROM BEING BITTEN BY THE PATIENT.

## (undated) DEVICE — LUBE GEL ENDOGLIDE 1.1OZ

## (undated) DEVICE — SYR LUERLOK 50ML

## (undated) DEVICE — SPNG ENDO BEDSIDE TUB ENZYM

## (undated) DEVICE — TUBING, SUCTION, 1/4" X 10', STRAIGHT: Brand: MEDLINE

## (undated) DEVICE — CONTN GRAD MEAS TRIANG 32OZ BLK